# Patient Record
Sex: MALE | Race: WHITE | NOT HISPANIC OR LATINO | Employment: OTHER | URBAN - METROPOLITAN AREA
[De-identification: names, ages, dates, MRNs, and addresses within clinical notes are randomized per-mention and may not be internally consistent; named-entity substitution may affect disease eponyms.]

---

## 2017-01-06 ENCOUNTER — HOSPITAL ENCOUNTER (OUTPATIENT)
Dept: RADIOLOGY | Facility: MEDICAL CENTER | Age: 82
End: 2017-01-06
Attending: FAMILY MEDICINE
Payer: MEDICARE

## 2017-01-06 DIAGNOSIS — I71.9 DESCENDING AORTIC ANEURYSM (HCC): ICD-10-CM

## 2017-01-06 PROCEDURE — 76775 US EXAM ABDO BACK WALL LIM: CPT

## 2017-01-09 ENCOUNTER — TELEPHONE (OUTPATIENT)
Dept: MEDICAL GROUP | Facility: MEDICAL CENTER | Age: 82
End: 2017-01-09

## 2017-01-09 NOTE — TELEPHONE ENCOUNTER
----- Message from Lucinda Schilling M.D. sent at 1/9/2017  8:04 AM PST -----  Aneurysm is stable in size. Have you seen a vascular surgeon for this? I would like you to see one if you have not

## 2017-01-10 NOTE — TELEPHONE ENCOUNTER
My[facility]QU[facility] Released Result Comments      Entered by Lucinda Schilling M.D. at 1/9/2017  8:04 AM     Read by Dannie Thurston at 1/9/2017 12:33 PM     Aneurysm is stable in size. Have you seen a vascular surgeon for this? I would like you to see one if you have not

## 2017-01-30 ENCOUNTER — HOSPITAL ENCOUNTER (OUTPATIENT)
Dept: LAB | Facility: MEDICAL CENTER | Age: 82
End: 2017-01-30
Attending: FAMILY MEDICINE
Payer: MEDICARE

## 2017-01-30 DIAGNOSIS — I10 ESSENTIAL HYPERTENSION: ICD-10-CM

## 2017-01-30 DIAGNOSIS — E55.9 VITAMIN D INSUFFICIENCY: ICD-10-CM

## 2017-01-30 DIAGNOSIS — R53.83 OTHER FATIGUE: ICD-10-CM

## 2017-01-30 DIAGNOSIS — D75.89 MACROCYTOSIS: ICD-10-CM

## 2017-01-30 DIAGNOSIS — E78.5 DYSLIPIDEMIA, GOAL LDL BELOW 70: ICD-10-CM

## 2017-01-30 LAB
25(OH)D3 SERPL-MCNC: 29 NG/ML (ref 30–100)
ALBUMIN SERPL BCP-MCNC: 4.4 G/DL (ref 3.2–4.9)
ALBUMIN/GLOB SERPL: 1.8 G/DL
ALP SERPL-CCNC: 45 U/L (ref 30–99)
ALT SERPL-CCNC: 26 U/L (ref 2–50)
ANION GAP SERPL CALC-SCNC: 7 MMOL/L (ref 0–11.9)
AST SERPL-CCNC: 22 U/L (ref 12–45)
BASOPHILS # BLD AUTO: 0.09 K/UL (ref 0–0.12)
BASOPHILS NFR BLD AUTO: 1.1 % (ref 0–1.8)
BILIRUB SERPL-MCNC: 0.6 MG/DL (ref 0.1–1.5)
BUN SERPL-MCNC: 19 MG/DL (ref 8–22)
CALCIUM SERPL-MCNC: 9.2 MG/DL (ref 8.5–10.5)
CHLORIDE SERPL-SCNC: 103 MMOL/L (ref 96–112)
CHOLEST SERPL-MCNC: 164 MG/DL (ref 100–199)
CO2 SERPL-SCNC: 25 MMOL/L (ref 20–33)
CREAT SERPL-MCNC: 0.77 MG/DL (ref 0.5–1.4)
CREAT UR-MCNC: 134.2 MG/DL
EOSINOPHIL # BLD: 0.11 K/UL (ref 0–0.51)
EOSINOPHIL NFR BLD AUTO: 1.4 % (ref 0–6.9)
ERYTHROCYTE [DISTWIDTH] IN BLOOD BY AUTOMATED COUNT: 53.7 FL (ref 35.9–50)
FOLATE SERPL-MCNC: 21.8 NG/ML
GLOBULIN SER CALC-MCNC: 2.5 G/DL (ref 1.9–3.5)
GLUCOSE SERPL-MCNC: 99 MG/DL (ref 65–99)
HCT VFR BLD AUTO: 44 % (ref 42–52)
HDLC SERPL-MCNC: 61 MG/DL
HGB BLD-MCNC: 14 G/DL (ref 14–18)
IMM GRANULOCYTES # BLD AUTO: 0.02 K/UL (ref 0–0.11)
IMM GRANULOCYTES NFR BLD AUTO: 0.2 % (ref 0–0.9)
LDLC SERPL CALC-MCNC: 83 MG/DL
LYMPHOCYTES # BLD: 3.46 K/UL (ref 1–4.8)
LYMPHOCYTES NFR BLD AUTO: 43.2 % (ref 22–41)
MCH RBC QN AUTO: 32 PG (ref 27–33)
MCHC RBC AUTO-ENTMCNC: 31.8 G/DL (ref 33.7–35.3)
MCV RBC AUTO: 100.7 FL (ref 81.4–97.8)
MICROALBUMIN UR-MCNC: <0.7 MG/DL
MICROALBUMIN/CREAT UR: NORMAL MG/G (ref 0–30)
MONOCYTES # BLD: 0.65 K/UL (ref 0–0.85)
MONOCYTES NFR BLD AUTO: 8.1 % (ref 0–13.4)
NEUTROPHILS # BLD: 3.68 K/UL (ref 1.82–7.42)
NEUTROPHILS NFR BLD AUTO: 46 % (ref 44–72)
NRBC # BLD AUTO: 0.02 K/UL
NRBC BLD-RTO: 0.2 /100 WBC
PLATELET # BLD AUTO: 251 K/UL (ref 164–446)
PMV BLD AUTO: 10.2 FL (ref 9–12.9)
POTASSIUM SERPL-SCNC: 4.3 MMOL/L (ref 3.6–5.5)
PROT SERPL-MCNC: 6.9 G/DL (ref 6–8.2)
RBC # BLD AUTO: 4.37 M/UL (ref 4.7–6.1)
SODIUM SERPL-SCNC: 135 MMOL/L (ref 135–145)
TRIGL SERPL-MCNC: 102 MG/DL (ref 0–149)
TSH SERPL DL<=0.005 MIU/L-ACNC: 3.08 UIU/ML (ref 0.3–3.7)
VIT B12 SERPL-MCNC: 609 PG/ML (ref 211–911)
WBC # BLD AUTO: 8 K/UL (ref 4.8–10.8)

## 2017-01-30 PROCEDURE — 82746 ASSAY OF FOLIC ACID SERUM: CPT

## 2017-01-30 PROCEDURE — 85025 COMPLETE CBC W/AUTO DIFF WBC: CPT

## 2017-01-30 PROCEDURE — 82306 VITAMIN D 25 HYDROXY: CPT

## 2017-01-30 PROCEDURE — 82570 ASSAY OF URINE CREATININE: CPT

## 2017-01-30 PROCEDURE — 84443 ASSAY THYROID STIM HORMONE: CPT

## 2017-01-30 PROCEDURE — 82607 VITAMIN B-12: CPT

## 2017-01-30 PROCEDURE — 80061 LIPID PANEL: CPT

## 2017-01-30 PROCEDURE — 80053 COMPREHEN METABOLIC PANEL: CPT

## 2017-01-30 PROCEDURE — 82043 UR ALBUMIN QUANTITATIVE: CPT

## 2017-01-30 PROCEDURE — 36415 COLL VENOUS BLD VENIPUNCTURE: CPT

## 2017-02-08 ENCOUNTER — TELEPHONE (OUTPATIENT)
Dept: URGENT CARE | Facility: CLINIC | Age: 82
End: 2017-02-08

## 2017-02-09 NOTE — TELEPHONE ENCOUNTER
----- Message from Nathalie Madison M.D. sent at 2/8/2017  4:32 PM PST -----  Please advise pt his recent labs were normal, no changes or action needed at this time.  Nathalie Madison M.D. covering for Lucinda Schilling M.D.

## 2017-02-27 ENCOUNTER — OFFICE VISIT (OUTPATIENT)
Dept: MEDICAL GROUP | Facility: PHYSICIAN GROUP | Age: 82
End: 2017-02-27
Payer: MEDICARE

## 2017-02-27 VITALS
DIASTOLIC BLOOD PRESSURE: 64 MMHG | BODY MASS INDEX: 26.6 KG/M2 | SYSTOLIC BLOOD PRESSURE: 126 MMHG | RESPIRATION RATE: 16 BRPM | OXYGEN SATURATION: 95 % | TEMPERATURE: 97.8 F | HEIGHT: 71 IN | WEIGHT: 190 LBS | HEART RATE: 60 BPM

## 2017-02-27 DIAGNOSIS — E78.5 DYSLIPIDEMIA: ICD-10-CM

## 2017-02-27 DIAGNOSIS — R09.81 NASAL CONGESTION: ICD-10-CM

## 2017-02-27 DIAGNOSIS — Z23 NEED FOR VACCINATION: ICD-10-CM

## 2017-02-27 DIAGNOSIS — Z13.0 SCREENING FOR ENDOCRINE, METABOLIC AND IMMUNITY DISORDER: ICD-10-CM

## 2017-02-27 DIAGNOSIS — H40.9 GLAUCOMA, UNSPECIFIED GLAUCOMA, UNSPECIFIED LATERALITY: ICD-10-CM

## 2017-02-27 DIAGNOSIS — Z13.228 SCREENING FOR ENDOCRINE, METABOLIC AND IMMUNITY DISORDER: ICD-10-CM

## 2017-02-27 DIAGNOSIS — I10 ESSENTIAL HYPERTENSION: ICD-10-CM

## 2017-02-27 DIAGNOSIS — G89.29 CHRONIC LOW BACK PAIN, UNSPECIFIED BACK PAIN LATERALITY, WITH SCIATICA PRESENCE UNSPECIFIED: ICD-10-CM

## 2017-02-27 DIAGNOSIS — R07.89 OTHER CHEST PAIN: ICD-10-CM

## 2017-02-27 DIAGNOSIS — M54.5 CHRONIC LOW BACK PAIN, UNSPECIFIED BACK PAIN LATERALITY, WITH SCIATICA PRESENCE UNSPECIFIED: ICD-10-CM

## 2017-02-27 DIAGNOSIS — Z13.29 SCREENING FOR ENDOCRINE, METABOLIC AND IMMUNITY DISORDER: ICD-10-CM

## 2017-02-27 DIAGNOSIS — N40.0 BENIGN PROSTATIC HYPERPLASIA, PRESENCE OF LOWER URINARY TRACT SYMPTOMS UNSPECIFIED, UNSPECIFIED MORPHOLOGY: ICD-10-CM

## 2017-02-27 DIAGNOSIS — I77.811 ABDOMINAL AORTIC ECTASIA (HCC): ICD-10-CM

## 2017-02-27 PROBLEM — M54.50 CHRONIC LOW BACK PAIN: Status: ACTIVE | Noted: 2017-02-27

## 2017-02-27 PROCEDURE — G0009 ADMIN PNEUMOCOCCAL VACCINE: HCPCS | Performed by: INTERNAL MEDICINE

## 2017-02-27 PROCEDURE — 90732 PPSV23 VACC 2 YRS+ SUBQ/IM: CPT | Performed by: INTERNAL MEDICINE

## 2017-02-27 PROCEDURE — 99214 OFFICE O/P EST MOD 30 MIN: CPT | Mod: 25 | Performed by: INTERNAL MEDICINE

## 2017-02-27 PROCEDURE — 90472 IMMUNIZATION ADMIN EACH ADD: CPT | Performed by: INTERNAL MEDICINE

## 2017-02-27 PROCEDURE — 90736 HZV VACCINE LIVE SUBQ: CPT | Performed by: INTERNAL MEDICINE

## 2017-02-27 RX ORDER — IPRATROPIUM BROMIDE 42 UG/1
SPRAY, METERED NASAL
COMMUNITY
Start: 2017-02-22 | End: 2017-02-27

## 2017-02-27 ASSESSMENT — PATIENT HEALTH QUESTIONNAIRE - PHQ9: CLINICAL INTERPRETATION OF PHQ2 SCORE: 0

## 2017-02-27 NOTE — ASSESSMENT & PLAN NOTE
Pt is on simvastatin 40 mg daily and a baby aspirin daily. He follows with cardiology. Patient is taking medication as prescribed    Patient denies myalgias. Per chart review there is no elevated liver enzymes recently

## 2017-02-27 NOTE — ASSESSMENT & PLAN NOTE
Pt in on lisinopril 20 mg daily for HTN. She reports she is compliant with medication. BP is at goal per JNC 8 critieria today.     Denies chest pain, shortness of breath, headache

## 2017-02-27 NOTE — MR AVS SNAPSHOT
"        Dannie Thurston   2017 2:00 PM   Office Visit   MRN: [id number]    Department:  [facility]   Dept Phone:  [phone]    Description:  Male : 1934   Provider:  Rigoberto Mandujano M.D.           Reason for Visit     Establish Care test results      Allergies as of 2017     Allergen Noted Reactions    Nkda [No Known Drug Allergy] 2011         You were diagnosed with     Dyslipidemia   [527921]       Other chest pain   [786.59.ICD-9-CM]       Chronic low back pain, unspecified back pain laterality, with sciatica presence unspecified   [1631896]       Abdominal aortic ectasia (CMS-HCC)   [447.72.ICD-9-CM]       Benign prostatic hyperplasia, presence of lower urinary tract symptoms unspecified, unspecified morphology   [4037524]       Essential hypertension   [7994408]       Glaucoma, unspecified glaucoma, unspecified laterality   [4419413]       Nasal congestion   [466113]       Screening for endocrine, metabolic and immunity disorder   [9514949]       Need for vaccination   [613588]         Vital Signs     Blood Pressure Pulse Temperature Respirations Height Weight    126/64 mmHg 60 36.6 °C (97.8 °F) 16 1.803 m (5' 11\") 86.183 kg (190 lb)    Body Mass Index Oxygen Saturation Smoking Status             26.51 kg/m2 95% Never Smoker          Basic Information     Date Of Birth Sex Race Ethnicity Preferred Language    1934 Male White Non- English      Problem List              ICD-10-CM Priority Class Noted - Resolved    HTN (hypertension) I10   2011 - Present    Other chest pain - atypical R07.89   2011 - Present    Dyslipidemia E78.5   2011 - Present    Abdominal aortic ectasia (CMS-HCC) I77.811 High  2015 - Present    Descending aortic aneurysm (CMS-HCC) I71.9   2015 - Present    Insomnia G47.00   2015 - Present    Daytime hypersomnolence G47.19   2015 - Present    Macrocytosis D75.89   2015 - Present    BPH (benign prostatic hyperplasia) N40.0   " 12/14/2015 - Present    Malaise and fatigue R53.81, R53.83   12/14/2015 - Present    Nasal congestion R09.81   6/14/2016 - Present    Discoloration of tongue K14.8   6/14/2016 - Present    Fatigue R53.83   12/15/2016 - Present    Chronic low back pain M54.5, G89.29   2/27/2017 - Present    Glaucoma H40.9   2/27/2017 - Present      Health Maintenance        Date Due Completion Dates    COLONOSCOPY 5/5/1984 ---    IMM ZOSTER VACCINE 5/5/1994 ---    IMM PNEUMOCOCCAL 65+ (ADULT) LOW/MEDIUM RISK SERIES (2 of 2 - PPSV23) 1/27/2017 1/27/2016, 10/11/2014    IMM DTaP/Tdap/Td Vaccine (2 - Td) 6/13/2022 6/13/2012            Current Immunizations     13-VALENT PCV PREVNAR 1/27/2016  1:40 PM, 10/11/2014    Influenza Vaccine Quad Inj (Preserved) 10/19/2016    Pneumococcal polysaccharide vaccine (PPSV-23)  Incomplete    SHINGLES VACCINE  Incomplete    Tdap Vaccine 6/13/2012      Below and/or attached are the medications your provider expects you to take. Review all of your home medications and newly ordered medications with your provider and/or pharmacist. Follow medication instructions as directed by your provider and/or pharmacist. Please keep your medication list with you and share with your provider. Update the information when medications are discontinued, doses are changed, or new medications (including over-the-counter products) are added; and carry medication information at all times in the event of emergency situations     Allergies:  NKDA - (reactions not documented)               Medications  Valid as of: February 27, 2017 -  2:52 PM    Generic Name Brand Name Tablet Size Instructions for use    Ascorbic Acid   Take 500 mg by mouth.        Aspirin   Take 81 mg by mouth.        Cholecalciferol   Take  by mouth.        Dorzolamide HCl-Timolol Mal (Solution) COSOPT 22.3-6.8 MG/ML Place 1 Drop in left eye 2 times a day.        Finasteride (Tab) PROSCAR 5 MG Take 5 mg by mouth every day.        Fluticasone Propionate  (Suspension) FLONASE 50 MCG/ACT Spray 1 Spray in nose every day.        Glucosamine HCl   Take 1,500 mg by mouth.        Lisinopril (Tab) PRINIVIL 20 MG Take 30 mg by mouth every day.        Multiple Vitamins-Minerals   Take  by mouth.        Polyethylene Glycol 3350   Take  by mouth as needed.        Psyllium   Take  by mouth every day.        Simvastatin (Tab) ZOCOR 40 MG Take 40 mg by mouth every evening.        Tamsulosin HCl (Cap) FLOMAX 0.4 MG Take 0.4 mg by mouth ONE-HALF HOUR AFTER BREAKFAST. 2 tablets        .                 Medicines prescribed today were sent to:     [facility] - [place], [place] - [address] [facility]    [address] [place] [address]    Phone: [phone] Fax: [phone]    Open 24 Hours?: No    [facility] - [facility] [address] [facility]    [address] [facility] [address]    Phone: [phone] Fax: [phone]    Open 24 Hours?: No      Medication refill instructions:       If your prescription bottle indicates you have medication refills left, it is not necessary to call your provider’s office. Please contact your pharmacy and they will refill your medication.    If your prescription bottle indicates you do not have any refills left, you may request refills at any time through one of the following ways: The online [facility] system (except Urgent Care), by calling your provider’s office, or by asking your pharmacy to contact your provider’s office with a refill request. Medication refills are processed only during regular business hours and may not be available until the next business day. Your provider may request additional information or to have a follow-up visit with you prior to refilling your medication.   *Please Note: Medication refills are assigned a new Rx number when refilled electronically. Your pharmacy may indicate that no refills were authorized even though a new prescription for the same medication is available at the pharmacy. Please  request the medicine by name with the pharmacy before contacting your provider for a refill.        Your To Do List     Future Labs/Procedures Complete By Expires    CBC WITH DIFFERENTIAL  As directed 2/27/2018    COMP METABOLIC PANEL  As directed 2/27/2018    LIPID PROFILE  As directed 2/27/2018    TSH WITH REFLEX TO FT4  As directed 2/27/2018    VITAMIN D,25 HYDROXY  As directed 2/27/2018      Referral     A referral request has been sent to our patient care coordination department. Please allow 3-5 business days for us to process this request and contact you either by phone or mail. If you do not hear from us by the 5th business day, please call us at [phone].           [facility] Access Code: Activation code not generated  Current [facility] Status: Active

## 2017-02-27 NOTE — ASSESSMENT & PLAN NOTE
Pt has had this seen on ultrasound. The most recent ultrasound actually showed a decreased size from previous.

## 2017-02-27 NOTE — Clinical Note
[facility]  Rigoberto Mandujano M.D.  [address]  [place] [address]  Fax: [phone]   Authorization for Release/Disclosure of   Protected Health Information   Name: STUART SCHREIBER : 1934 SSN: [id number]   Address: [address] [place] [address] [employer]   [facility] [address] Phone:    [phone] (home)    I authorize the entity listed below to release/disclose the PHI below to:   [facility]/Rigoberto Mandujano M.D. and Rigoberto Mandujano M.D.   Provider or Entity Name:  [facility]   Address   [facility], [address]  [address] [place] [address] Phone:      Fax:     Reason for request: continuity of care   Information to be released:    [  ] LAST COLONOSCOPY,  including any PATH REPORT and follow-up  [  ] LAST FIT/COLOGUARD RESULT [  ] LAST DEXA  [  ] LAST MAMMOGRAM  [  ] LAST PAP  [  ] LAST LABS [  ] RETINA EXAM REPORT  [  ] IMMUNIZATION RECORDS  [  ] Release all info      [  ] Check here and initial the line next to each item to release ALL health information INCLUDING  _____ Care and treatment for drug and / or alcohol abuse  _____ HIV testing, infection status, or AIDS  _____ Genetic Testing    DATES OF SERVICE OR TIME PERIOD TO BE DISCLOSED: _____________  I understand and acknowledge that:  * This Authorization may be revoked at any time by you in writing, except if your health information has already been used or disclosed.  * Your health information that will be used or disclosed as a result of you signing this authorization could be re-disclosed by the recipient. If this occurs, your re-disclosed health information may no longer be protected by State or Federal laws.  * You may refuse to sign this Authorization. Your refusal will not affect your ability to obtain treatment.  * This Authorization becomes effective upon signing and will  on (date) __________.      If no date is indicated, this Authorization will  one (1) year from the signature date.    Name: Stuart Schreiber    Signature:   Date:        2/27/2017       PLEASE FAX REQUESTED RECORDS BACK TO: [phone]

## 2017-02-27 NOTE — ASSESSMENT & PLAN NOTE
Pt is on flonase for this symptoms with adequate control of symptom. He follows with ENT Dr. Chavez

## 2017-05-13 ENCOUNTER — PATIENT OUTREACH (OUTPATIENT)
Dept: HEALTH INFORMATION MANAGEMENT | Facility: OTHER | Age: 82
End: 2017-05-13

## 2017-05-13 NOTE — PROGRESS NOTES
Attempt #:1    WebIZ Checked & Epic Updated: yes  HealthConnect Verified: no  Verify PCP: yes    Communication Preference Obtained: yes     Review Care Team: yes    Annual Wellness Visit Scheduling  1. Scheduling Status:Scheduled       Care Gap Scheduling (Attempt to Schedule EACH Overdue Care Gap!)     Health Maintenance Due   Topic Date Due   • COLONOSCOPY  Requesting Records         [employer] Activation: already active  Predictvia Brien: no  Virtual Visits: no  Opt In to Text Messages: no

## 2017-05-20 ENCOUNTER — OFFICE VISIT (OUTPATIENT)
Dept: MEDICAL GROUP | Facility: PHYSICIAN GROUP | Age: 82
End: 2017-05-20
Payer: MEDICARE

## 2017-05-20 VITALS
WEIGHT: 187 LBS | HEART RATE: 81 BPM | TEMPERATURE: 97.9 F | OXYGEN SATURATION: 96 % | HEIGHT: 71 IN | BODY MASS INDEX: 26.18 KG/M2 | SYSTOLIC BLOOD PRESSURE: 120 MMHG | DIASTOLIC BLOOD PRESSURE: 68 MMHG

## 2017-05-20 DIAGNOSIS — R07.89 OTHER CHEST PAIN: ICD-10-CM

## 2017-05-20 DIAGNOSIS — E78.5 DYSLIPIDEMIA: ICD-10-CM

## 2017-05-20 DIAGNOSIS — H40.9 GLAUCOMA, UNSPECIFIED GLAUCOMA, UNSPECIFIED LATERALITY: ICD-10-CM

## 2017-05-20 DIAGNOSIS — I10 ESSENTIAL HYPERTENSION: ICD-10-CM

## 2017-05-20 DIAGNOSIS — M54.5 CHRONIC LOW BACK PAIN, UNSPECIFIED BACK PAIN LATERALITY, WITH SCIATICA PRESENCE UNSPECIFIED: ICD-10-CM

## 2017-05-20 DIAGNOSIS — R53.81 MALAISE AND FATIGUE: ICD-10-CM

## 2017-05-20 DIAGNOSIS — N40.0 BENIGN PROSTATIC HYPERPLASIA, PRESENCE OF LOWER URINARY TRACT SYMPTOMS UNSPECIFIED, UNSPECIFIED MORPHOLOGY: ICD-10-CM

## 2017-05-20 DIAGNOSIS — I71.9 DESCENDING AORTIC ANEURYSM (HCC): ICD-10-CM

## 2017-05-20 DIAGNOSIS — G47.19 DAYTIME HYPERSOMNOLENCE: ICD-10-CM

## 2017-05-20 DIAGNOSIS — R09.81 NASAL CONGESTION: ICD-10-CM

## 2017-05-20 DIAGNOSIS — G47.00 INSOMNIA, UNSPECIFIED TYPE: ICD-10-CM

## 2017-05-20 DIAGNOSIS — G89.29 CHRONIC LOW BACK PAIN, UNSPECIFIED BACK PAIN LATERALITY, WITH SCIATICA PRESENCE UNSPECIFIED: ICD-10-CM

## 2017-05-20 DIAGNOSIS — I77.811 ABDOMINAL AORTIC ECTASIA (HCC): ICD-10-CM

## 2017-05-20 DIAGNOSIS — R53.83 MALAISE AND FATIGUE: ICD-10-CM

## 2017-05-20 DIAGNOSIS — K14.8 DISCOLORATION OF TONGUE: ICD-10-CM

## 2017-05-20 DIAGNOSIS — D75.89 MACROCYTOSIS: ICD-10-CM

## 2017-05-20 RX ORDER — IBUPROFEN 200 MG
200 TABLET ORAL EVERY 6 HOURS PRN
Status: ON HOLD | COMMUNITY
End: 2017-08-15

## 2017-05-20 RX ORDER — TIMOLOL MALEATE 5 MG/ML
1 SOLUTION/ DROPS OPHTHALMIC 2 TIMES DAILY
COMMUNITY
End: 2017-08-04

## 2017-05-20 ASSESSMENT — PATIENT HEALTH QUESTIONNAIRE - PHQ9: CLINICAL INTERPRETATION OF PHQ2 SCORE: 0

## 2017-05-20 NOTE — ASSESSMENT & PLAN NOTE
Chronic condition managed with current medical regimen   Continue with current diet management  Followed by Rigoberto Mandujano M.D..

## 2017-05-20 NOTE — PROGRESS NOTES
CC:   Medicare Annual Wellness Visit    HPI:  Dannie is a 83 y.o. here for Medicare Annual Wellness Visit    Patient Active Problem List    Diagnosis Date Noted   • Abdominal aortic ectasia (CMS-HCC) 04/08/2015     Priority: High   • Chronic low back pain 02/27/2017   • Glaucoma 02/27/2017   • Fatigue 12/15/2016   • Nasal congestion 06/14/2016   • Discoloration of tongue 06/14/2016   • BPH (benign prostatic hyperplasia) 12/14/2015   • Insomnia 07/07/2015   • Daytime hypersomnolence 07/07/2015   • Macrocytosis 07/07/2015   • HTN (hypertension) 08/05/2011   • Other chest pain - atypical 08/05/2011   • Dyslipidemia 08/05/2011     Current Outpatient Prescriptions   Medication Sig Dispense Refill   • timolol (TIMOPTIC) 0.5 % Solution Place 1 Drop in both eyes 2 times a day.     • ibuprofen (MOTRIN) 200 MG Tab Take 200 mg by mouth every 6 hours as needed.     • fluticasone (FLONASE) 50 MCG/ACT nasal spray Spray 1 Spray in nose every day. 16 g 3   • finasteride (PROSCAR) 5 MG TABS Take 5 mg by mouth every day.     • tamsulosin (FLOMAX) 0.4 MG capsule Take 0.4 mg by mouth ONE-HALF HOUR AFTER BREAKFAST. 2 tablets     • lisinopril (PRINIVIL) 20 MG TABS Take 30 mg by mouth every day.     • simvastatin (ZOCOR) 40 MG TABS Take 40 mg by mouth every evening.     • dorzolamide-timolol (COSOPT) 2-0.5 % SOLN Place 1 Drop in left eye 2 times a day.     • Ascorbic Acid (VITAMIN C PO) Take 500 mg by mouth.     • ASPIRIN PO Take 81 mg by mouth.     • Multiple Vitamins-Minerals (CENTRUM SILVER PO) Take  by mouth.     • GLUCOSAMINE PO Take 1,500 mg by mouth.     • Psyllium (METAMUCIL PO) Take  by mouth every day.     • VITAMIN D PO Take  by mouth.     • Polyethylene Glycol 3350 (MIRALAX PO) Take  by mouth as needed.       No current facility-administered medications for this visit.      Current supplements: no  Chronic narcotic pain medicines: no  Allergies: Nkda  Exercise: yes active  Current social contact/activities Has support of  family and friends      Screening:  Depression Screening    Little interest or pleasure in doing things?  0 - not at all  Feeling down, depressed , or hopeless? 0 - not at all  Trouble falling or staying asleep, or sleeping too much?     Feeling tired or having little energy?     Poor appetite or overeating?     Feeling bad about yourself - or that you are a failure or have let yourself or your family down?    Trouble concentrating on things, such as reading the newspaper or watching television?    Moving or speaking so slowly that other people could have noticed.  Or the opposite - being so fidgety or restless that you have been moving around a lot more than usual?     Thoughts that you would be better off dead, or of hurting yourself?     Patient Health Questionnaire Score:    If depressive symptoms identified deferred to follow up visit unless specifically addressed in assessment and plan.    Interpretation of PHQ-9 Total Score   Score Severity   1-4 Minimal Depression   5-9 Mild Depression   10-14 Moderate Depression   15-19 Moderately Severe Depression   20-27 Severe Depression    Screening for Cognitive Impairment    Three Minute Recall (banana, sunrise, fence)  2/3    Draw clock face with all 12 numbers set to the hand to show 10 minures past 11 o'clock  1 4  If cognitive concerns identified deferred to follow up visit unless specifically addressed in assessment and plan.    Fall Risk Assessment    Has the patient had two or more falls in the last year or any fall with injury in the last year?  No  If Fall Risk identified deferred to follow up visit unless specifically addressed in assessment and plan.    Safety Assessment    Throw rugs on floor.  No  Handrails on all stairs.  Yes  Good lighting in all hallways.  Yes  Difficulty hearing.  No  Patient counseled about all safety risks that were identified.    Functional Assessment ADLs    Are there any barriers preventing you from cooking for yourself or  meeting nutritional needs?  No.    Are there any barriers preventing you from driving safely or obtaining transportation?  No.    Are there any barriers preventing you from using a telephone or calling for help?  No.    Are there any barriers preventing you from shopping?  No.    Are there any barriers preventing you from taking care of your own finances?  No.    Are there any barriers preventing you from managing your medications?  No.    Are currently engaing any exercise or physical activity?  Yes.       Health Maintenance Summary                Annual Wellness Visit Overdue 5/5/1934     IMM DTaP/Tdap/Td Vaccine Next Due 6/13/2022      Done 6/13/2012 Imm Admin: Tdap Vaccine          Patient Care Team:  Rigoberto Mandujano M.D. as PCP - General (Internal Medicine)  Juan F Reyes M.D. as Consulting Physician (Cardiology)  Mely Shaver M.D. as Consulting Physician (Dermatology)  Dhaval Tate M.D. as Consulting Physician (Surgery)  Matthew Ramos M.D. as Consulting Physician (Otolaryngology)  Ty Sanchez M.D. as Consulting Physician (Phys Med and Rehab)  Pancho Olivares M.D. as Consulting Physician (Oncology)  Reji Moralez M.D. as Consulting Physician (Ophthalmology)  Reece Montague PA-C as Consulting Physician (Urology)  Dakota Allan M.D. as Consulting Physician (Gastroenterology)  LA NENA Reid as Consulting Physician (Audiology)        Social History   Substance Use Topics   • Smoking status: Former Smoker -- 3 years     Types: Pipe, Cigars     Quit date: 05/20/1972   • Smokeless tobacco: Never Used   • Alcohol Use: 1.2 oz/week     1 Shots of liquor, 1 Cans of beer per week       Family History   Problem Relation Age of Onset   • Kidney Disease Mother    • Heart Disease Father      He  has a past medical history of Arthritis; Hypertension; CATARACT; Hyperlipidemia; and BPH (benign prostatic hyperplasia).   Past Surgical History   Procedure Laterality Date   • Tonsillectomy       CHILD   •  "Cataract phaco with iol  2008     BILATERAL   • Trigger finger release  10/8/2010     Performed by VIV COHEN at SURGERY SAME DAY [facility] ORS       ROS:    Ostomy or other tubes or amputations: no  Chronic oxygen use no  Last eye exam 3/2017  : Denies incontinence.   Gait: Uses no assistive device   Hearing excellent.    Dentition good    Exam: Blood pressure 120/68, pulse 81, temperature 36.6 °C (97.9 °F), height 1.816 m (5' 11.5\"), weight 84.823 kg (187 lb), SpO2 96 %. Body mass index is 25.72 kg/(m^2).  Alert, oriented in no acute distress.  Eye contact is good, speech goal directed, affect calm      Assessment and Plan. The following treatment and monitoring plan is recommended for all problems as listed below:   Abdominal aortic ectasia  Followed by cardiology q 12 months  Denies sxs  Appears to have decreased in size with most recent u/s     BPH (benign prostatic hyperplasia)  Chronic condition managed with current medical regimen  Stable per review   Continue with current meds  Followed by urology       Chronic low back pain  Chronic condition managed with current medical regimen  Stable per review   Continue with plan for PT, has had an epidural in the past  Followed by ortho q 6 wks       Descending aortic aneurysm (CMS-HCC)  duplicate    Daytime hypersomnolence  Pt states has had discussed with cardiology who was concerned about a lower heart rate  Pt will discuss sleep apnea concern at next visit    Discoloration of tongue  Per pt has had work up and states no findings  Followed by Rigoberto Mandujano M.D..     Dyslipidemia  Chronic condition managed with current medical regimen   Continue with current diet management  Followed by Rigoberto Mandujano M.D..         Fatigue  Chronic condition managed with current medical regimen  Stable per review   Continue with current surveillance  Followed by Rigoberto Mandujano M.D..         Glaucoma  Chronic condition managed with current medical regimen  Stable per review   " Continue with current eye meds  Followed by opth.         HTN (hypertension)  Chronic condition managed with current medical regimen  Stable per review   Continue with current meds  Followed by Rigoberto Mandujano M.D..         Insomnia  Chronic condition managed with current medical regimen  Stable per review   Continue with surveillance  Followed by Rigoberto Mandujano M.D..         Macrocytosis  Followed by Rigoberto Mandujano M.D..     Malaise and fatigue  duplicate    Nasal congestion  Chronic condition managed with current medical regimen  Stable per review   Continue with current meds prn  Followed by Rigoberto Mandujano M.D..          Other chest pain - atypical  Followed by cardiology q 6 months  Denies cardiac sxs  Continue with current medications           Health Care Screening recommendations reviewed with patient today: per Patient Instructions  DPA/Advanced directive: .has an advanced directive - a copy has been provided    Next office visit for recheck of chronic medical conditions is due with Rigoberto Mandujano M.D. 8/28/2017

## 2017-05-20 NOTE — ASSESSMENT & PLAN NOTE
Pt states has had discussed with cardiology who was concerned about a lower heart rate  Pt will discuss sleep apnea concern at next visit

## 2017-05-20 NOTE — PATIENT INSTRUCTIONS
Continue with care through Rigoberto Mandujano M.D..  Next Medicare Annual Wellness Visit is due in one year.    Continue care with specialists you are seeing for your chronic problems.    Attached is some preventative information for you to read.          Fall Prevention and Home Safety  Falls cause injuries and can affect all age groups. It is possible to prevent falls.   HOW TO PREVENT FALLS  · Wear shoes with rubber soles that do not have an opening for your toes.   · Keep the inside and outside of your house well lit.   · Use night lights throughout your home.   · Remove clutter from floors.   · Clean up floor spills.   · Remove throw rugs or fasten them to the floor with carpet tape.   · Do not place electrical cords across pathways.   · Put grab bars by your tub, shower, and toilet. Do not use towel bars as grab bars.   · Put handrails on both sides of the stairway. Fix loose handrails.   · Do not climb on stools or stepladders, if possible.   · Do not wax your floors.   · Repair uneven or unsafe sidewalks, walkways, or stairs.   · Keep items you use a lot within reach.   · Be aware of pets.   · Keep emergency numbers next to the telephone.   · Put smoke detectors in your home and near bedrooms.   Ask your doctor what other things you can do to prevent falls.  Document Released: 10/14/2010 Document Revised: 06/18/2013 Document Reviewed: 03/19/2013  ExitCare® Patient Information ©2013 [employer].    Exercise to Stay Healthy      Exercise helps you become and stay healthy.  EXERCISE IDEAS AND TIPS  Choose exercises that:  · You enjoy.   · Fit into your day.   You do not need to exercise really hard to be healthy. You can do exercises at a slow or medium level and stay healthy. You can:  · Stretch before and after working out.   · Try yoga, Pilates, or renetta chi.   · Lift weights.   · Walk fast, swim, jog, run, climb stairs, bicycle, dance, or rollerskate.   · Take aerobic classes.   Exercises that burn about 150  calories:  · Running 1 ½ miles in 15 minutes.   · Playing volleyball for 45 to 60 minutes.   · Washing and waxing a car for 45 to 60 minutes.   · Playing touch football for 45 minutes.   · Walking 1 ¾ miles in 35 minutes.   · Pushing a stroller 1 ½ miles in 30 minutes.   · Playing basketball for 30 minutes.   · Raking leaves for 30 minutes.   · Bicycling 5 miles in 30 minutes.   · Walking 2 miles in 30 minutes.   · Dancing for 30 minutes.   · Shoveling snow for 15 minutes.   · Swimming laps for 20 minutes.   · Walking up stairs for 15 minutes.   · Bicycling 4 miles in 15 minutes.   · Gardening for 30 to 45 minutes.   · Jumping rope for 15 minutes.   · Washing windows or floors for 45 to 60 minutes.     One suggestion is to start walking for 10 minutes after each meal.  This will help with digestion and help you to metabolize your meal.       For Weight Loss -   Recommend portion sizes with more fruits/vegetables/high fiber foods.  Stay away from white bread/pastas/white rice/white potatoes.  Increase Fluid intake to 6-8 glasses of water daily.   Eliminate soda's (diet and regular) from your fluid intake.      Document Released: 01/20/2012 Document Revised: 03/11/2013 Document Reviewed: 01/20/2012  ExitCare® Patient Information ©2013 [employer].    Fat and Cholesterol Control Diet  Cholesterol is a wax-like substance. It comes from your liver and is found in certain foods. There is good (HDL) and bad (LDL) cholesterol. Too much cholesterol in your blood can affect your heart. Certain foods can lower or raise your cholesterol. Eat foods that are low in cholesterol.  Saturated and trans fats are bad fats found in foods that will raise your cholesterol. Do not eat foods that are high in saturated and trans fats.  FOODS HIGHER IN SATURATED AND TRANS FATS  · Dairy products, such as whole milk, eggs, cheese, cream, and butter.   · Fatty meats, such as hot dogs, sausage, and salami.   · Fried foods.   · Trans fats which  are found in margarine and pre-made cookies, crackers, and baked goods.   · Tropical oils, such as coconut and palm oils.   Read package labels at the store. Do not buy products that use saturated or trans fats or hydrogenated oils. Find foods labeled:  · Low-fat.   · Low-saturated fat.   · Trans-fat-free.   · Low-cholesterol.   FOODS LOWER IN CHOLESTEROL  ·  Fruit.   · Vegetables.   · Beans, peas, and lentils.   · Fish.   · Lean meat, such as chicken (without skin) or ground turkey.   · Grains, such as barley, rice, couscous, bulgur wheat, and pasta.   · Heart-healthy tub margarine.   PREPARING YOUR FOOD  · Broil, bake, steam, or roast foods. Do not harrington food.   · Use non-stick cooking sprays.   · Use lemon or herbs to flavor food instead of using butter or stick margarine.   · Use nonfat yogurt, salsa, or low-fat dressings for salads.   Document Released: 06/18/2013 Document Reviewed: 06/18/2013  ExitCare® Patient Information ©2013 [employer].    Recommend annual flu vaccine.  Next due in Fall, 2015.  If you decide not to have the flu vaccine, recommend good handwashing and staying out of crowds during flu season.

## 2017-05-20 NOTE — ASSESSMENT & PLAN NOTE
Chronic condition managed with current medical regimen  Stable per review   Continue with surveillance  Followed by Rigoberto Mandujano M.D..

## 2017-05-20 NOTE — ASSESSMENT & PLAN NOTE
Chronic condition managed with current medical regimen  Stable per review   Continue with current eye meds  Followed by opth.

## 2017-05-20 NOTE — ASSESSMENT & PLAN NOTE
Chronic condition managed with current medical regimen  Stable per review   Continue with current surveillance  Followed by Rigoberto Mandujano M.D..

## 2017-05-20 NOTE — ASSESSMENT & PLAN NOTE
Chronic condition managed with current medical regimen  Stable per review   Continue with current meds  Followed by Rigoberto Mandujano M.D..

## 2017-05-20 NOTE — ASSESSMENT & PLAN NOTE
Chronic condition managed with current medical regimen  Stable per review   Continue with current meds prn  Followed by Rigoberto Mandujano M.D..

## 2017-05-20 NOTE — ASSESSMENT & PLAN NOTE
Chronic condition managed with current medical regimen  Stable per review   Continue with current meds  Followed by urology

## 2017-05-20 NOTE — ASSESSMENT & PLAN NOTE
Followed by cardiology q 12 months  Denies sxs  Appears to have decreased in size with most recent u/s

## 2017-05-20 NOTE — ASSESSMENT & PLAN NOTE
Chronic condition managed with current medical regimen  Stable per review   Continue with plan for PT, has had an epidural in the past  Followed by ortho q 6 wks

## 2017-06-06 ENCOUNTER — OFFICE VISIT (OUTPATIENT)
Dept: URGENT CARE | Facility: PHYSICIAN GROUP | Age: 82
End: 2017-06-06
Payer: MEDICARE

## 2017-06-06 VITALS
HEART RATE: 88 BPM | OXYGEN SATURATION: 94 % | WEIGHT: 185 LBS | HEIGHT: 71 IN | RESPIRATION RATE: 16 BRPM | TEMPERATURE: 97.7 F | DIASTOLIC BLOOD PRESSURE: 68 MMHG | BODY MASS INDEX: 25.9 KG/M2 | SYSTOLIC BLOOD PRESSURE: 110 MMHG

## 2017-06-06 DIAGNOSIS — K40.90 UNILATERAL INGUINAL HERNIA WITHOUT OBSTRUCTION OR GANGRENE, RECURRENCE NOT SPECIFIED: ICD-10-CM

## 2017-06-06 PROCEDURE — 99203 OFFICE O/P NEW LOW 30 MIN: CPT | Performed by: NURSE PRACTITIONER

## 2017-06-06 PROCEDURE — 1101F PT FALLS ASSESS-DOCD LE1/YR: CPT | Performed by: NURSE PRACTITIONER

## 2017-06-06 PROCEDURE — G8432 DEP SCR NOT DOC, RNG: HCPCS | Performed by: NURSE PRACTITIONER

## 2017-06-06 PROCEDURE — 4040F PNEUMOC VAC/ADMIN/RCVD: CPT | Performed by: NURSE PRACTITIONER

## 2017-06-06 PROCEDURE — G8419 CALC BMI OUT NRM PARAM NOF/U: HCPCS | Performed by: NURSE PRACTITIONER

## 2017-06-06 PROCEDURE — 1036F TOBACCO NON-USER: CPT | Performed by: NURSE PRACTITIONER

## 2017-06-06 ASSESSMENT — ENCOUNTER SYMPTOMS
VOMITING: 0
ABDOMINAL PAIN: 1
DIARRHEA: 0
CONSTIPATION: 0
NAUSEA: 0
FEVER: 0

## 2017-06-06 NOTE — PATIENT INSTRUCTIONS
Inguinal Hernia, Adult  Muscles help keep everything in the body in its proper place. But if a weak spot in the muscles develops, something can poke through. That is called a hernia. When this happens in the lower part of the belly (abdomen), it is called an inguinal hernia. (It takes its name from a part of the body in this region called the inguinal canal.) A weak spot in the wall of muscles lets some fat or part of the small intestine bulge through. An inguinal hernia can develop at any age. Men get them more often than women.  CAUSES   In adults, an inguinal hernia develops over time.  · It can be triggered by:  ¨ Suddenly straining the muscles of the lower abdomen.  ¨ Lifting heavy objects.  ¨ Straining to have a bowel movement. Difficult bowel movements (constipation) can lead to this.  ¨ Constant coughing. This may be caused by smoking or lung disease.  ¨ Being overweight.  ¨ Being pregnant.  ¨ Working at a job that requires long periods of standing or heavy lifting.  ¨ Having had an inguinal hernia before.  One type can be an emergency situation. It is called a strangulated inguinal hernia. It develops if part of the small intestine slips through the weak spot and cannot get back into the abdomen. The blood supply can be cut off. If that happens, part of the intestine may die. This situation requires emergency surgery.  SYMPTOMS   Often, a small inguinal hernia has no symptoms. It is found when a healthcare provider does a physical exam. Larger hernias usually have symptoms.   · In adults, symptoms may include:  ¨ A lump in the groin. This is easier to see when the person is standing. It might disappear when lying down.  ¨ In men, a lump in the scrotum.  ¨ Pain or burning in the groin. This occurs especially when lifting, straining or coughing.  ¨ A dull ache or feeling of pressure in the groin.  · Signs of a strangulated hernia can include:  ¨ A bulge in the groin that becomes very painful and tender to the  touch.  ¨ A bulge that turns red or purple.  ¨ Fever, nausea and vomiting.  ¨ Inability to have a bowel movement or to pass gas.  DIAGNOSIS   To decide if you have an inguinal hernia, a healthcare provider will probably do a physical examination.  · This will include asking questions about any symptoms you have noticed.  · The healthcare provider might feel the groin area and ask you to cough. If an inguinal hernia is felt, the healthcare provider may try to slide it back into the abdomen.  · Usually no other tests are needed.  TREATMENT   Treatments can vary. The size of the hernia makes a difference. Options include:  · Watchful waiting. This is often suggested if the hernia is small and you have had no symptoms.  ¨ No medical procedure will be done unless symptoms develop.  ¨ You will need to watch closely for symptoms. If any occur, contact your healthcare provider right away.  · Surgery. This is used if the hernia is larger or you have symptoms.  ¨ Open surgery. This is usually an outpatient procedure (you will not stay overnight in a hospital). An cut (incision) is made through the skin in the groin. The hernia is put back inside the abdomen. The weak area in the muscles is then repaired by herniorrhaphy or hernioplasty. Herniorrhaphy: in this type of surgery, the weak muscles are sewn back together. Hernioplasty: a patch or mesh is used to close the weak area in the abdominal wall.  ¨ Laparoscopy. In this procedure, a surgeon makes small incisions. A thin tube with a tiny video camera (called a laparoscope) is put into the abdomen. The surgeon repairs the hernia with mesh by looking with the video camera and using two long instruments.  HOME CARE INSTRUCTIONS   · After surgery to repair an inguinal hernia:  ¨ You will need to take pain medicine prescribed by your healthcare provider. Follow all directions carefully.  ¨ You will need to take care of the wound from the incision.  ¨ Your activity will be  "restricted for awhile. This will probably include no heavy lifting for several weeks. You also should not do anything too active for a few weeks. When you can return to work will depend on the type of job that you have.  · During \"watchful waiting\" periods, you should:  ¨ Maintain a healthy weight.  ¨ Eat a diet high in fiber (fruits, vegetables and whole grains).  ¨ Drink plenty of fluids to avoid constipation. This means drinking enough water and other liquids to keep your urine clear or pale yellow.  ¨ Do not lift heavy objects.  ¨ Do not stand for long periods of time.  ¨ Quit smoking. This should keep you from developing a frequent cough.  SEEK MEDICAL CARE IF:   · A bulge develops in your groin area.  · You feel pain, a burning sensation or pressure in the groin. This might be worse if you are lifting or straining.  · You develop a fever of more than 100.5° F (38.1° C).  SEEK IMMEDIATE MEDICAL CARE IF:   · Pain in the groin increases suddenly.  · A bulge in the groin gets bigger suddenly and does not go down.  · For men, there is sudden pain in the scrotum. Or, the size of the scrotum increases.  · A bulge in the groin area becomes red or purple and is painful to touch.  · You have nausea or vomiting that does not go away.  · You feel your heart beating much faster than normal.  · You cannot have a bowel movement or pass gas.  · You develop a fever of more than 102.0° F (38.9° C).     This information is not intended to replace advice given to you by your health care provider. Make sure you discuss any questions you have with your health care provider.     Document Released: 05/06/2010 Document Revised: 03/11/2013 Document Reviewed: 05/06/2010  [employer] Interactive Patient Education ©2016 [employer]    "

## 2017-08-04 DIAGNOSIS — Z01.812 PRE-OPERATIVE LABORATORY EXAMINATION: ICD-10-CM

## 2017-08-04 DIAGNOSIS — Z01.810 PRE-OPERATIVE CARDIOVASCULAR EXAMINATION: ICD-10-CM

## 2017-08-04 LAB
ANION GAP SERPL CALC-SCNC: 9 MMOL/L (ref 0–11.9)
BUN SERPL-MCNC: 15 MG/DL (ref 8–22)
CALCIUM SERPL-MCNC: 9.5 MG/DL (ref 8.5–10.5)
CHLORIDE SERPL-SCNC: 104 MMOL/L (ref 96–112)
CO2 SERPL-SCNC: 23 MMOL/L (ref 20–33)
CREAT SERPL-MCNC: 0.76 MG/DL (ref 0.5–1.4)
EKG IMPRESSION: NORMAL
GFR SERPL CREATININE-BSD FRML MDRD: >60 ML/MIN/1.73 M 2
GLUCOSE SERPL-MCNC: 85 MG/DL (ref 65–99)
POTASSIUM SERPL-SCNC: 4.7 MMOL/L (ref 3.6–5.5)
SODIUM SERPL-SCNC: 136 MMOL/L (ref 135–145)

## 2017-08-04 PROCEDURE — 80048 BASIC METABOLIC PNL TOTAL CA: CPT

## 2017-08-04 PROCEDURE — 93010 ELECTROCARDIOGRAM REPORT: CPT | Performed by: INTERNAL MEDICINE

## 2017-08-04 PROCEDURE — 36415 COLL VENOUS BLD VENIPUNCTURE: CPT

## 2017-08-04 PROCEDURE — 93005 ELECTROCARDIOGRAM TRACING: CPT

## 2017-08-04 RX ORDER — AMOXICILLIN 500 MG
CAPSULE ORAL DAILY
COMMUNITY
End: 2022-10-06

## 2017-08-04 NOTE — OR NURSING
preadmit appointment: Pt. Instructed to continue regularly prescribed medication through the day before surgery, instructed to take the following medications, the day of surgery, with a sip of water per anesthesia protocol : Flomax

## 2017-08-07 ENCOUNTER — TELEPHONE (OUTPATIENT)
Dept: MEDICAL GROUP | Facility: PHYSICIAN GROUP | Age: 82
End: 2017-08-07

## 2017-08-07 NOTE — TELEPHONE ENCOUNTER
----- Message from Rigoberto Mandujano M.D. sent at 8/6/2017  8:26 PM PDT -----  Can we please forward patient's recent EKG results to Dr. Juan F Reyes - patient's cardiologist at [facility]? Thank you    Rigoberto Mandujano M.D.

## 2017-08-08 PROCEDURE — 85027 COMPLETE CBC AUTOMATED: CPT

## 2017-08-14 ENCOUNTER — RX ONLY (OUTPATIENT)
Age: 82
Setting detail: RX ONLY
End: 2017-08-14

## 2017-08-15 ENCOUNTER — HOSPITAL ENCOUNTER (OUTPATIENT)
Facility: MEDICAL CENTER | Age: 82
End: 2017-08-15
Attending: SURGERY | Admitting: SURGERY
Payer: MEDICARE

## 2017-08-15 VITALS
HEART RATE: 72 BPM | HEIGHT: 71 IN | OXYGEN SATURATION: 95 % | WEIGHT: 188.93 LBS | SYSTOLIC BLOOD PRESSURE: 103 MMHG | TEMPERATURE: 97.3 F | BODY MASS INDEX: 26.45 KG/M2 | DIASTOLIC BLOOD PRESSURE: 74 MMHG | RESPIRATION RATE: 16 BRPM

## 2017-08-15 PROBLEM — K40.20 BILATERAL INGUINAL HERNIA: Status: ACTIVE | Noted: 2017-08-15

## 2017-08-15 LAB — EKG IMPRESSION: NORMAL

## 2017-08-15 PROCEDURE — 700101 HCHG RX REV CODE 250

## 2017-08-15 PROCEDURE — 93010 ELECTROCARDIOGRAM REPORT: CPT | Performed by: INTERNAL MEDICINE

## 2017-08-15 PROCEDURE — 93005 ELECTROCARDIOGRAM TRACING: CPT | Performed by: STUDENT IN AN ORGANIZED HEALTH CARE EDUCATION/TRAINING PROGRAM

## 2017-08-15 RX ORDER — LIDOCAINE HYDROCHLORIDE 10 MG/ML
INJECTION, SOLUTION INFILTRATION; PERINEURAL
Status: COMPLETED
Start: 2017-08-15 | End: 2017-08-15

## 2017-08-15 RX ORDER — SODIUM CHLORIDE, SODIUM LACTATE, POTASSIUM CHLORIDE, CALCIUM CHLORIDE 600; 310; 30; 20 MG/100ML; MG/100ML; MG/100ML; MG/100ML
INJECTION, SOLUTION INTRAVENOUS CONTINUOUS
Status: DISCONTINUED | OUTPATIENT
Start: 2017-08-15 | End: 2017-08-15 | Stop reason: HOSPADM

## 2017-08-15 RX ORDER — HYDROCODONE BITARTRATE AND ACETAMINOPHEN 7.5; 325 MG/1; MG/1
1 TABLET ORAL EVERY 4 HOURS PRN
Qty: 20 TAB | Refills: 0 | Status: SHIPPED | OUTPATIENT
Start: 2017-08-15 | End: 2019-05-16

## 2017-08-15 RX ADMIN — LIDOCAINE HYDROCHLORIDE 0.1 ML: 10 INJECTION, SOLUTION INFILTRATION; PERINEURAL at 08:15

## 2017-08-15 RX ADMIN — SODIUM CHLORIDE, SODIUM LACTATE, POTASSIUM CHLORIDE, CALCIUM CHLORIDE: 600; 310; 30; 20 INJECTION, SOLUTION INTRAVENOUS at 08:15

## 2017-08-15 ASSESSMENT — PAIN SCALES - GENERAL: PAINLEVEL_OUTOF10: 0

## 2017-08-15 NOTE — OR NURSING
Patient allergies and NPO status verified, home medication reconciliation completed, belongings secured. Patient verbalizes understanding of pain scale, expected course of stay and plan of care. Surgical site verified with patient, IV access established sequentials placed on BLE.      1045 MD at bedside previous EKG show pt in A-fib. F/U stat EKG ordered in pre-op. Pt still in A-Fib. MD canceled surgery due to current cardiac status. Pt educated to f/u with cardiologist.      1110 Pt D/C home.

## 2017-08-18 ENCOUNTER — HOSPITAL ENCOUNTER (OUTPATIENT)
Dept: LAB | Facility: MEDICAL CENTER | Age: 82
End: 2017-08-18
Attending: INTERNAL MEDICINE
Payer: MEDICARE

## 2017-08-18 DIAGNOSIS — Z13.0 SCREENING FOR ENDOCRINE, METABOLIC AND IMMUNITY DISORDER: ICD-10-CM

## 2017-08-18 DIAGNOSIS — Z13.29 SCREENING FOR ENDOCRINE, METABOLIC AND IMMUNITY DISORDER: ICD-10-CM

## 2017-08-18 DIAGNOSIS — Z13.228 SCREENING FOR ENDOCRINE, METABOLIC AND IMMUNITY DISORDER: ICD-10-CM

## 2017-08-18 DIAGNOSIS — I10 ESSENTIAL HYPERTENSION: ICD-10-CM

## 2017-08-18 DIAGNOSIS — E78.5 DYSLIPIDEMIA: ICD-10-CM

## 2017-08-18 LAB
25(OH)D3 SERPL-MCNC: 21 NG/ML (ref 30–100)
BASOPHILS # BLD AUTO: 1.1 % (ref 0–1.8)
BASOPHILS # BLD: 0.07 K/UL (ref 0–0.12)
EOSINOPHIL # BLD AUTO: 0.21 K/UL (ref 0–0.51)
EOSINOPHIL NFR BLD: 3.3 % (ref 0–6.9)
ERYTHROCYTE [DISTWIDTH] IN BLOOD BY AUTOMATED COUNT: 52.5 FL (ref 35.9–50)
HCT VFR BLD AUTO: 44.8 % (ref 42–52)
HGB BLD-MCNC: 14.6 G/DL (ref 14–18)
IMM GRANULOCYTES # BLD AUTO: 0.02 K/UL (ref 0–0.11)
IMM GRANULOCYTES NFR BLD AUTO: 0.3 % (ref 0–0.9)
LYMPHOCYTES # BLD AUTO: 2.41 K/UL (ref 1–4.8)
LYMPHOCYTES NFR BLD: 38.3 % (ref 22–41)
MCH RBC QN AUTO: 32.2 PG (ref 27–33)
MCHC RBC AUTO-ENTMCNC: 32.6 G/DL (ref 33.7–35.3)
MCV RBC AUTO: 98.7 FL (ref 81.4–97.8)
MONOCYTES # BLD AUTO: 0.49 K/UL (ref 0–0.85)
MONOCYTES NFR BLD AUTO: 7.8 % (ref 0–13.4)
NEUTROPHILS # BLD AUTO: 3.09 K/UL (ref 1.82–7.42)
NEUTROPHILS NFR BLD: 49.2 % (ref 44–72)
NRBC # BLD AUTO: 0 K/UL
NRBC BLD AUTO-RTO: 0 /100 WBC
PLATELET # BLD AUTO: 222 K/UL (ref 164–446)
PMV BLD AUTO: 10.4 FL (ref 9–12.9)
RBC # BLD AUTO: 4.54 M/UL (ref 4.7–6.1)
TSH SERPL DL<=0.005 MIU/L-ACNC: 2.62 UIU/ML (ref 0.3–3.7)
WBC # BLD AUTO: 6.3 K/UL (ref 4.8–10.8)

## 2017-08-18 PROCEDURE — 36415 COLL VENOUS BLD VENIPUNCTURE: CPT

## 2017-08-18 PROCEDURE — 82306 VITAMIN D 25 HYDROXY: CPT | Mod: GA

## 2017-08-18 PROCEDURE — 80061 LIPID PANEL: CPT

## 2017-08-18 PROCEDURE — 84443 ASSAY THYROID STIM HORMONE: CPT

## 2017-08-18 PROCEDURE — 85025 COMPLETE CBC W/AUTO DIFF WBC: CPT

## 2017-08-18 PROCEDURE — 80053 COMPREHEN METABOLIC PANEL: CPT

## 2017-08-19 LAB
ALBUMIN SERPL BCP-MCNC: 4.1 G/DL (ref 3.2–4.9)
ALBUMIN/GLOB SERPL: 1.6 G/DL
ALP SERPL-CCNC: 45 U/L (ref 30–99)
ALT SERPL-CCNC: 24 U/L (ref 2–50)
ANION GAP SERPL CALC-SCNC: 7 MMOL/L (ref 0–11.9)
AST SERPL-CCNC: 22 U/L (ref 12–45)
BILIRUB SERPL-MCNC: 0.6 MG/DL (ref 0.1–1.5)
BUN SERPL-MCNC: 15 MG/DL (ref 8–22)
CALCIUM SERPL-MCNC: 9.3 MG/DL (ref 8.5–10.5)
CHLORIDE SERPL-SCNC: 106 MMOL/L (ref 96–112)
CHOLEST SERPL-MCNC: 154 MG/DL (ref 100–199)
CO2 SERPL-SCNC: 25 MMOL/L (ref 20–33)
CREAT SERPL-MCNC: 0.85 MG/DL (ref 0.5–1.4)
GFR SERPL CREATININE-BSD FRML MDRD: >60 ML/MIN/1.73 M 2
GLOBULIN SER CALC-MCNC: 2.5 G/DL (ref 1.9–3.5)
GLUCOSE SERPL-MCNC: 91 MG/DL (ref 65–99)
HDLC SERPL-MCNC: 59 MG/DL
LDLC SERPL CALC-MCNC: 73 MG/DL
POTASSIUM SERPL-SCNC: 4.4 MMOL/L (ref 3.6–5.5)
PROT SERPL-MCNC: 6.6 G/DL (ref 6–8.2)
SODIUM SERPL-SCNC: 138 MMOL/L (ref 135–145)
TRIGL SERPL-MCNC: 108 MG/DL (ref 0–149)

## 2017-08-25 ENCOUNTER — TELEPHONE (OUTPATIENT)
Dept: MEDICAL GROUP | Facility: PHYSICIAN GROUP | Age: 82
End: 2017-08-25

## 2017-08-25 NOTE — TELEPHONE ENCOUNTER
ESTABLISHED PATIENT PRE-VISIT PLANNING     Note: Patient will not be contacted if there is no indication to call.     1.  Reviewed notes from the last few office visits within the medical group: Yes    2.  If any orders were placed at last visit or intended to be done for this visit (i.e. 6 mos follow-up), do we have Results/Consult Notes?        •  Labs - Labs ordered, completed and results are in chart.       •  Imaging - Imaging was not ordered at last office visit.       •  Referrals - No referrals were ordered at last office visit.    3. Is this appointment scheduled as a Hospital Follow-Up? No    4.  Immunizations were updated in Epic using WebIZ?: Epic matches WebIZ       •  Web Iz Recommendations: Patient is up to date on all vaccines    5.  Patient is due for the following Health Maintenance Topics:   Health Maintenance Due   Topic Date Due   • IMM INFLUENZA (1) 09/01/2017       - Patient is up-to-date on all Health Maintenance topics. No records have been requested at this time.    6.  Patient was informed to arrive 15 min prior to their scheduled appointment and bring in their medication bottles.

## 2017-08-28 ENCOUNTER — OFFICE VISIT (OUTPATIENT)
Dept: MEDICAL GROUP | Facility: PHYSICIAN GROUP | Age: 82
End: 2017-08-28
Payer: MEDICARE

## 2017-08-28 VITALS
OXYGEN SATURATION: 95 % | TEMPERATURE: 97.3 F | BODY MASS INDEX: 27.44 KG/M2 | HEART RATE: 63 BPM | HEIGHT: 71 IN | DIASTOLIC BLOOD PRESSURE: 68 MMHG | SYSTOLIC BLOOD PRESSURE: 112 MMHG | WEIGHT: 196 LBS

## 2017-08-28 DIAGNOSIS — E55.9 VITAMIN D DEFICIENCY: ICD-10-CM

## 2017-08-28 DIAGNOSIS — R53.83 FATIGUE, UNSPECIFIED TYPE: ICD-10-CM

## 2017-08-28 DIAGNOSIS — I48.91 ATRIAL FIBRILLATION, UNSPECIFIED TYPE (HCC): ICD-10-CM

## 2017-08-28 DIAGNOSIS — E78.5 DYSLIPIDEMIA: ICD-10-CM

## 2017-08-28 PROCEDURE — 99214 OFFICE O/P EST MOD 30 MIN: CPT | Performed by: INTERNAL MEDICINE

## 2017-08-28 RX ORDER — CHOLECALCIFEROL (VITAMIN D3) 10 MCG
2000 CAPSULE ORAL DAILY
Qty: 30 CAP | COMMUNITY
Start: 2017-08-28

## 2017-08-28 RX ORDER — TRAZODONE HYDROCHLORIDE 50 MG/1
50-100 TABLET ORAL NIGHTLY PRN
Qty: 60 TAB | Refills: 3 | Status: SHIPPED | OUTPATIENT
Start: 2017-08-28 | End: 2017-12-28 | Stop reason: SDUPTHER

## 2017-08-28 NOTE — ASSESSMENT & PLAN NOTE
Pt was recently found to have AFIB. He follows with cardiology and is on Xarelto for anticoagulation. Pt follows with Dr. Reyes of [facility]. Pt denies chest pain, SOB, LE edema. He does report occasional palpitations.

## 2017-08-28 NOTE — ASSESSMENT & PLAN NOTE
Pt reports that he has difficulty with maintenance of sleep. He has no problem with getting to sleep initially. He is fatigued while driving. He reports that he does not always wake up feeling refreshed from sleep. However, he does report that when he gets a good night of sleep he feels much better. He denies AM headaches. He tries to exercise regularly. He is not taking medication for this.     He feels that he doesn't have ambition to do anything. His wife has brought up concern about possible depression. He feels he is optimistic overall. He has stopped doing hobbies that he used to do in the past but he is still reading and doing meditation. He denies suicidal ideation.

## 2017-08-28 NOTE — LETTER
[facility]  Rigoberto Mandujano M.D.  [address]  [place] [phone]  Fax: [phone]   Authorization for Release/Disclosure of   Protected Health Information   Name: STUART SCHREIBER : 1934 SSN: [id number]   Address: [address] [place] [address] [place] [address] Phone:    [phone] (home)    I authorize the entity listed below to release/disclose the PHI below to:   [facility]/Rigoberto Mandujano M.D. and Rigoberto Mandujano M.D.   Provider or Entity Name:  Dr. Dimitris Reyes - [facility]   Address   [facility], [facility], [facility]   Phone:      Fax:     Reason for request: continuity of care   Information to be released:    [  ] LAST COLONOSCOPY,  including any PATH REPORT and follow-up  [  ] LAST FIT/COLOGUARD RESULT [  ] LAST DEXA  [  ] LAST MAMMOGRAM  [  ] LAST PAP  [  ] LAST LABS [  ] RETINA EXAM REPORT  [  ] IMMUNIZATION RECORDS  [  ] Release all info  [x] clinic notes      [  ] Check here and initial the line next to each item to release ALL health information INCLUDING  _____ Care and treatment for drug and / or alcohol abuse  _____ HIV testing, infection status, or AIDS  _____ Genetic Testing    DATES OF SERVICE OR TIME PERIOD TO BE DISCLOSED: _____________  I understand and acknowledge that:  * This Authorization may be revoked at any time by you in writing, except if your health information has already been used or disclosed.  * Your health information that will be used or disclosed as a result of you signing this authorization could be re-disclosed by the recipient. If this occurs, your re-disclosed health information may no longer be protected by State or Federal laws.  * You may refuse to sign this Authorization. Your refusal will not affect your ability to obtain treatment.  * This Authorization becomes effective upon signing and will  on (date) __________.      If no date is indicated, this Authorization will  one (1) year from the signature date.    Name: Stuart Schreiber    Signature:   Date:          8/28/2017       PLEASE FAX REQUESTED RECORDS BACK TO: [phone]

## 2017-08-28 NOTE — PROGRESS NOTES
Subjective:   Dannie Thurston is a 83 y.o. male here today for AFIB, fatigue, vitamin D deficiency    A-fib (CMS-HCC)  Pt was recently found to have AFIB. He follows with cardiology and is on Xarelto for anticoagulation. Pt follows with Dr. Reyes of [facility]. Pt denies chest pain, SOB, LE edema. He does report occasional palpitations.     Fatigue  Pt reports that he has difficulty with maintenance of sleep. He has no problem with getting to sleep initially. He is fatigued while driving. He reports that he does not always wake up feeling refreshed from sleep. However, he does report that when he gets a good night of sleep he feels much better. He denies AM headaches. He tries to exercise regularly. He is not taking medication for this.     He feels that he doesn't have ambition to do anything. His wife has brought up concern about possible depression. He feels he is optimistic overall. He has stopped doing hobbies that he used to do in the past but he is still reading and doing meditation. He denies suicidal ideation.     Vitamin D deficiency  Mildly low vitamin D seen on labs at 21. Pt is not on supplementation for this.        Current medicines (including changes today)  Current Outpatient Prescriptions   Medication Sig Dispense Refill   • rivaroxaban (XARELTO) 20 MG Tab tablet Take 20 mg by mouth with dinner.     • trazodone (DESYREL) 50 MG Tab Take 1-2 Tabs by mouth at bedtime as needed for Sleep. 60 Tab 3   • Cholecalciferol (EQL VITAMIN D3) 2000 UNIT Cap Take 1 Cap by mouth every day. 30 Cap    • hydrocodone-acetaminophen (NORCO) 7.5-325 MG per tablet Take 1 Tab by mouth every four hours as needed (Pain). 20 Tab 0   • Omega-3 Fatty Acids (FISH OIL) 1200 MG Cap Take  by mouth every day.     • fluticasone (FLONASE) 50 MCG/ACT nasal spray Spray 1 Spray in nose every day. 16 g 3   • finasteride (PROSCAR) 5 MG TABS Take 5 mg by mouth every day.     • tamsulosin (FLOMAX) 0.4 MG capsule Take 0.4 mg by  "mouth ONE-HALF HOUR AFTER BREAKFAST. 2 tablets     • lisinopril (PRINIVIL) 20 MG TABS Take 30 mg by mouth every day.     • simvastatin (ZOCOR) 40 MG TABS Take 40 mg by mouth every evening.     • dorzolamide-timolol (COSOPT) 2-0.5 % SOLN Place 1 Drop in left eye 2 times a day.     • Ascorbic Acid (VITAMIN C PO) Take 500 mg by mouth.     • ASPIRIN PO Take 81 mg by mouth.     • Multiple Vitamins-Minerals (CENTRUM SILVER PO) Take  by mouth.     • GLUCOSAMINE PO Take 1,500 mg by mouth.     • Psyllium (METAMUCIL PO) Take  by mouth every day.     • Polyethylene Glycol 3350 (MIRALAX PO) Take  by mouth as needed.       No current facility-administered medications for this visit.      He  has a past medical history of Arthritis; Bowel habit changes; BPH (benign prostatic hyperplasia); CATARACT; Glaucoma; Heart murmur; Hyperlipidemia; Hypertension; and Pain.    ROS   No chest pain, no shortness of breath     Objective:     Blood pressure 112/68, pulse 63, temperature 36.3 °C (97.3 °F), height 1.803 m (5' 11\"), weight 88.9 kg (196 lb), SpO2 95 %. Body mass index is 27.34 kg/m².   Physical Exam:  Constitutional: Alert & oriented, no acute distress  Eye: Conjunctiva clear, lids normal, no discharge  ENMT: Lips without lesions, normal external nose and ears  Respiratory: Unlabored respiratory effort, lungs clear to auscultation, no wheezes, no ronchi  Cardiovascular: Irregularly irregular, no edema  Skin: Warm, dry, good turgor, no rashes in visible areas  Neuro: No overt focal neurologic deficits  Psych: Normal mood and affect      Assessment and Plan:   The following treatment plan was discussed    1. Atrial fibrillation, unspecified type (CMS-HCC)  Continue follow up with cardiology. Records request sent to cardiology  - COMP METABOLIC PANEL; Future    2. Fatigue, unspecified type  Will try trazodone to see if improved sleep helps fatigue. Will also order vitamin B12 level to be done prior to follow up.   - trazodone (DESYREL) " 50 MG Tab; Take 1-2 Tabs by mouth at bedtime as needed for Sleep.  Dispense: 60 Tab; Refill: 3  - CBC WITH DIFFERENTIAL; Future  - VITAMIN B12; Future    3. Vitamin D deficiency  Pt will start on OTC vitamin D supplementation at 2000 IU dailyi  - VITAMIN D,25 HYDROXY; Future    4. Dyslipidemia  Continue current medication, recheck prior to follow up  - LIPID PROFILE; Future    Followup: Return in about 3 months (around 11/28/2017).    Please note that this dictation was created using voice recognition software. I have made every reasonable attempt to correct obvious errors, but I expect that there are errors of grammar and possibly content that I did not discover before finalizing the note.

## 2017-09-01 ENCOUNTER — NON-PROVIDER VISIT (OUTPATIENT)
Dept: MEDICAL GROUP | Facility: PHYSICIAN GROUP | Age: 82
End: 2017-09-01
Payer: MEDICARE

## 2017-09-01 DIAGNOSIS — Z23 NEED FOR VACCINATION: ICD-10-CM

## 2017-09-01 PROCEDURE — 90662 IIV NO PRSV INCREASED AG IM: CPT | Performed by: INTERNAL MEDICINE

## 2017-09-01 PROCEDURE — G0008 ADMIN INFLUENZA VIRUS VAC: HCPCS | Performed by: INTERNAL MEDICINE

## 2017-09-11 ENCOUNTER — HOSPITAL ENCOUNTER (OUTPATIENT)
Dept: HOSPITAL 8 - STAR | Age: 82
Discharge: HOME | End: 2017-09-11
Attending: SURGERY
Payer: MEDICARE

## 2017-09-11 DIAGNOSIS — Z01.818: Primary | ICD-10-CM

## 2017-09-11 DIAGNOSIS — R94.31: ICD-10-CM

## 2017-09-11 DIAGNOSIS — I44.7: ICD-10-CM

## 2017-09-11 DIAGNOSIS — I48.91: ICD-10-CM

## 2017-09-11 LAB
AST SERPL-CCNC: 24 U/L (ref 15–37)
BUN SERPL-MCNC: 18 MG/DL (ref 7–18)

## 2017-09-11 PROCEDURE — 80053 COMPREHEN METABOLIC PANEL: CPT

## 2017-09-11 PROCEDURE — 36415 COLL VENOUS BLD VENIPUNCTURE: CPT

## 2017-09-11 PROCEDURE — 93005 ELECTROCARDIOGRAM TRACING: CPT

## 2017-09-20 ENCOUNTER — HOSPITAL ENCOUNTER (OUTPATIENT)
Dept: HOSPITAL 8 - OUT | Age: 82
Discharge: HOME | End: 2017-09-20
Attending: SURGERY
Payer: MEDICARE

## 2017-09-20 VITALS — DIASTOLIC BLOOD PRESSURE: 76 MMHG | SYSTOLIC BLOOD PRESSURE: 120 MMHG

## 2017-09-20 VITALS — WEIGHT: 187.39 LBS | HEIGHT: 71 IN | BODY MASS INDEX: 26.23 KG/M2

## 2017-09-20 DIAGNOSIS — I71.4: ICD-10-CM

## 2017-09-20 DIAGNOSIS — I48.91: ICD-10-CM

## 2017-09-20 DIAGNOSIS — K40.20: Primary | ICD-10-CM

## 2017-09-20 DIAGNOSIS — I10: ICD-10-CM

## 2017-09-20 PROCEDURE — C1727 CATH, BAL TIS DIS, NON-VAS: HCPCS

## 2017-09-20 PROCEDURE — C1781 MESH (IMPLANTABLE): HCPCS

## 2017-09-20 PROCEDURE — 49650 LAP ING HERNIA REPAIR INIT: CPT

## 2017-11-03 ENCOUNTER — TELEPHONE (OUTPATIENT)
Dept: MEDICAL GROUP | Facility: PHYSICIAN GROUP | Age: 82
End: 2017-11-03

## 2017-11-03 DIAGNOSIS — H91.90 HEARING LOSS, UNSPECIFIED HEARING LOSS TYPE, UNSPECIFIED LATERALITY: ICD-10-CM

## 2017-11-03 NOTE — TELEPHONE ENCOUNTER
1. Caller Name: Dannie Thurston                                         Call Back Number: [phone] (home)       Patient approves a detailed voicemail message: N\A    2. SPECIFIC Action To Be Taken: Referral needed otologist    3. Diagnosis/Clinical Reason for Request: loss of hearing  4. Specialty & Provider Name/Lab/Imaging Location: Dr. Derrick Servin    5. Is appointment scheduled for requested order/referral: yes -     Patient informed they will receive a return phone call from the office ONLY if there are any questions before processing their request. Advised to call back if they haven't received a call from the referral department in 5 days.

## 2017-12-13 ENCOUNTER — HOSPITAL ENCOUNTER (OUTPATIENT)
Dept: HOSPITAL 8 - CACL | Age: 82
End: 2017-12-13
Attending: INTERNAL MEDICINE
Payer: MEDICARE

## 2017-12-13 VITALS — HEIGHT: 71 IN | BODY MASS INDEX: 26.79 KG/M2 | WEIGHT: 191.36 LBS

## 2017-12-13 VITALS — DIASTOLIC BLOOD PRESSURE: 85 MMHG | SYSTOLIC BLOOD PRESSURE: 112 MMHG

## 2017-12-13 DIAGNOSIS — Z88.8: ICD-10-CM

## 2017-12-13 DIAGNOSIS — Z79.82: ICD-10-CM

## 2017-12-13 DIAGNOSIS — Z87.891: ICD-10-CM

## 2017-12-13 DIAGNOSIS — E78.00: ICD-10-CM

## 2017-12-13 DIAGNOSIS — I10: ICD-10-CM

## 2017-12-13 DIAGNOSIS — I48.91: Primary | ICD-10-CM

## 2017-12-13 PROCEDURE — 92960 CARDIOVERSION ELECTRIC EXT: CPT

## 2017-12-18 ENCOUNTER — TELEPHONE (OUTPATIENT)
Dept: MEDICAL GROUP | Facility: PHYSICIAN GROUP | Age: 82
End: 2017-12-18

## 2017-12-18 NOTE — TELEPHONE ENCOUNTER
VOICEMAIL  1. Caller Name: Dannie Thurston                        Call Back Number: [phone] (home)       2. Message: Patient received letter that Dr. Mandujano is leaving, patient called to verify his appointment on the DEC 28th, I called back verifying his appointment      3. Patient approves office to leave a detailed voicemail/MyChart message: N\A

## 2017-12-20 ENCOUNTER — HOSPITAL ENCOUNTER (OUTPATIENT)
Dept: LAB | Facility: MEDICAL CENTER | Age: 82
End: 2017-12-20
Attending: INTERNAL MEDICINE
Payer: MEDICARE

## 2017-12-20 DIAGNOSIS — E55.9 VITAMIN D DEFICIENCY: ICD-10-CM

## 2017-12-20 DIAGNOSIS — I48.91 ATRIAL FIBRILLATION, UNSPECIFIED TYPE (HCC): ICD-10-CM

## 2017-12-20 DIAGNOSIS — E78.5 DYSLIPIDEMIA: ICD-10-CM

## 2017-12-20 DIAGNOSIS — R53.83 FATIGUE, UNSPECIFIED TYPE: ICD-10-CM

## 2017-12-20 LAB
25(OH)D3 SERPL-MCNC: 27 NG/ML (ref 30–100)
ALBUMIN SERPL BCP-MCNC: 4.2 G/DL (ref 3.2–4.9)
ALBUMIN/GLOB SERPL: 1.7 G/DL
ALP SERPL-CCNC: 43 U/L (ref 30–99)
ALT SERPL-CCNC: 24 U/L (ref 2–50)
ANION GAP SERPL CALC-SCNC: 7 MMOL/L (ref 0–11.9)
AST SERPL-CCNC: 27 U/L (ref 12–45)
BASOPHILS # BLD AUTO: 1.5 % (ref 0–1.8)
BASOPHILS # BLD: 0.1 K/UL (ref 0–0.12)
BILIRUB SERPL-MCNC: 0.8 MG/DL (ref 0.1–1.5)
BUN SERPL-MCNC: 14 MG/DL (ref 8–22)
CALCIUM SERPL-MCNC: 9 MG/DL (ref 8.5–10.5)
CHLORIDE SERPL-SCNC: 107 MMOL/L (ref 96–112)
CHOLEST SERPL-MCNC: 147 MG/DL (ref 100–199)
CO2 SERPL-SCNC: 26 MMOL/L (ref 20–33)
CREAT SERPL-MCNC: 0.83 MG/DL (ref 0.5–1.4)
EOSINOPHIL # BLD AUTO: 0.19 K/UL (ref 0–0.51)
EOSINOPHIL NFR BLD: 2.9 % (ref 0–6.9)
ERYTHROCYTE [DISTWIDTH] IN BLOOD BY AUTOMATED COUNT: 54.7 FL (ref 35.9–50)
GFR SERPL CREATININE-BSD FRML MDRD: >60 ML/MIN/1.73 M 2
GLOBULIN SER CALC-MCNC: 2.5 G/DL (ref 1.9–3.5)
GLUCOSE SERPL-MCNC: 94 MG/DL (ref 65–99)
HCT VFR BLD AUTO: 43.4 % (ref 42–52)
HDLC SERPL-MCNC: 65 MG/DL
HGB BLD-MCNC: 13.9 G/DL (ref 14–18)
IMM GRANULOCYTES # BLD AUTO: 0.01 K/UL (ref 0–0.11)
IMM GRANULOCYTES NFR BLD AUTO: 0.2 % (ref 0–0.9)
LDLC SERPL CALC-MCNC: 67 MG/DL
LYMPHOCYTES # BLD AUTO: 2.51 K/UL (ref 1–4.8)
LYMPHOCYTES NFR BLD: 37.7 % (ref 22–41)
MCH RBC QN AUTO: 32 PG (ref 27–33)
MCHC RBC AUTO-ENTMCNC: 32 G/DL (ref 33.7–35.3)
MCV RBC AUTO: 99.8 FL (ref 81.4–97.8)
MONOCYTES # BLD AUTO: 0.59 K/UL (ref 0–0.85)
MONOCYTES NFR BLD AUTO: 8.9 % (ref 0–13.4)
NEUTROPHILS # BLD AUTO: 3.25 K/UL (ref 1.82–7.42)
NEUTROPHILS NFR BLD: 48.8 % (ref 44–72)
NRBC # BLD AUTO: 0 K/UL
NRBC BLD-RTO: 0 /100 WBC
PLATELET # BLD AUTO: 222 K/UL (ref 164–446)
PMV BLD AUTO: 10.5 FL (ref 9–12.9)
POTASSIUM SERPL-SCNC: 4.3 MMOL/L (ref 3.6–5.5)
PROT SERPL-MCNC: 6.7 G/DL (ref 6–8.2)
RBC # BLD AUTO: 4.35 M/UL (ref 4.7–6.1)
SODIUM SERPL-SCNC: 140 MMOL/L (ref 135–145)
TRIGL SERPL-MCNC: 74 MG/DL (ref 0–149)
VIT B12 SERPL-MCNC: 570 PG/ML (ref 211–911)
WBC # BLD AUTO: 6.7 K/UL (ref 4.8–10.8)

## 2017-12-20 PROCEDURE — 82306 VITAMIN D 25 HYDROXY: CPT

## 2017-12-20 PROCEDURE — 36415 COLL VENOUS BLD VENIPUNCTURE: CPT

## 2017-12-20 PROCEDURE — 80061 LIPID PANEL: CPT

## 2017-12-20 PROCEDURE — 85025 COMPLETE CBC W/AUTO DIFF WBC: CPT

## 2017-12-20 PROCEDURE — 82607 VITAMIN B-12: CPT

## 2017-12-20 PROCEDURE — 80053 COMPREHEN METABOLIC PANEL: CPT

## 2017-12-28 ENCOUNTER — OFFICE VISIT (OUTPATIENT)
Dept: MEDICAL GROUP | Facility: PHYSICIAN GROUP | Age: 82
End: 2017-12-28
Payer: MEDICARE

## 2017-12-28 VITALS
RESPIRATION RATE: 16 BRPM | SYSTOLIC BLOOD PRESSURE: 112 MMHG | WEIGHT: 200 LBS | OXYGEN SATURATION: 95 % | DIASTOLIC BLOOD PRESSURE: 86 MMHG | TEMPERATURE: 97.9 F | HEART RATE: 84 BPM | BODY MASS INDEX: 27.89 KG/M2

## 2017-12-28 DIAGNOSIS — R53.83 FATIGUE, UNSPECIFIED TYPE: ICD-10-CM

## 2017-12-28 DIAGNOSIS — I10 ESSENTIAL HYPERTENSION: ICD-10-CM

## 2017-12-28 DIAGNOSIS — G47.00 INSOMNIA, UNSPECIFIED TYPE: ICD-10-CM

## 2017-12-28 DIAGNOSIS — I48.91 ATRIAL FIBRILLATION, UNSPECIFIED TYPE (HCC): ICD-10-CM

## 2017-12-28 PROCEDURE — 99214 OFFICE O/P EST MOD 30 MIN: CPT | Performed by: INTERNAL MEDICINE

## 2017-12-28 RX ORDER — TRAZODONE HYDROCHLORIDE 50 MG/1
50-100 TABLET ORAL NIGHTLY PRN
Qty: 180 TAB | Refills: 1 | Status: SHIPPED | OUTPATIENT
Start: 2017-12-28 | End: 2021-11-17

## 2017-12-28 NOTE — ASSESSMENT & PLAN NOTE
Pt in on lisinopril 10 mg daily for HTN, this was decreased recently by patient's cardiologist due to occasional dizziness. BP is well controlled today.     He continues to get occasional dizziness but medication change only started today.     Denies chest pain, shortness of breath, headache, blurry vision.

## 2017-12-28 NOTE — PROGRESS NOTES
Subjective:   Dannie Thurston is a 83 y.o. male here today for HTN, insomnia, AFIB    HTN (hypertension)  Pt in on lisinopril 10 mg daily for HTN, this was decreased recently by patient's cardiologist due to occasional dizziness. BP is well controlled today.     He continues to get occasional dizziness but medication change only started today.     Denies chest pain, shortness of breath, headache, blurry vision.     Insomnia  Pt was started on trazodone at last visit for insomnia and fatigue associated with it. He reports that he has been taking it every night and has had some improvement in symptoms with it. He is satisfied with the sleep he is getting now on the medication.     A-fib (CMS-[facility], located within [facility])  Pt is following with Dr. Reyes of [facility]. He is on xarelto for this and is pending an echocardiogram. He gets occasional fluttering/palpitations. He was seen yesterday by cardiology. treatment will be determined after the echocardiogram. He had a cardioversion a few weeks ago but the results were only transient.        Current medicines (including changes today)  Current Outpatient Prescriptions   Medication Sig Dispense Refill   • rivaroxaban (XARELTO) 20 MG Tab tablet Take 20 mg by mouth with dinner.     • trazodone (DESYREL) 50 MG Tab Take 1-2 Tabs by mouth at bedtime as needed for Sleep. 60 Tab 3   • hydrocodone-acetaminophen (NORCO) 7.5-325 MG per tablet Take 1 Tab by mouth every four hours as needed (Pain). 20 Tab 0   • Omega-3 Fatty Acids (FISH OIL) 1200 MG Cap Take  by mouth every day.     • fluticasone (FLONASE) 50 MCG/ACT nasal spray Spray 1 Spray in nose every day. 16 g 3   • finasteride (PROSCAR) 5 MG TABS Take 5 mg by mouth every day.     • tamsulosin (FLOMAX) 0.4 MG capsule Take 0.4 mg by mouth ONE-HALF HOUR AFTER BREAKFAST. 2 tablets     • lisinopril (PRINIVIL) 20 MG TABS Take 30 mg by mouth every day.     • simvastatin (ZOCOR) 40 MG TABS Take 40 mg by mouth every evening.     •  dorzolamide-timolol (COSOPT) 2-0.5 % SOLN Place 1 Drop in left eye 2 times a day.     • Ascorbic Acid (VITAMIN C PO) Take 500 mg by mouth.     • ASPIRIN PO Take 81 mg by mouth.     • Multiple Vitamins-Minerals (CENTRUM SILVER PO) Take  by mouth.     • GLUCOSAMINE PO Take 1,500 mg by mouth.     • Psyllium (METAMUCIL PO) Take  by mouth every day.     • Polyethylene Glycol 3350 (MIRALAX PO) Take  by mouth as needed.     • Cholecalciferol (EQL VITAMIN D3) 2000 UNIT Cap Take 1 Cap by mouth every day. 30 Cap      No current facility-administered medications for this visit.      He  has a past medical history of Arthritis; Bowel habit changes; BPH (benign prostatic hyperplasia); CATARACT; Glaucoma; Heart murmur; Hyperlipidemia; Hypertension; and Pain.    ROS   No chest pain, no shortness of breath, no headache     Objective:     Blood pressure 112/86, pulse 84, temperature 36.6 °C (97.9 °F), resp. rate 16, weight 90.7 kg (200 lb), SpO2 95 %. Body mass index is 27.89 kg/m².   Physical Exam:  Constitutional: Alert & oriented, no acute distress  Eye: Conjunctiva clear, lids normal, no discharge  ENMT: Lips without lesions, normal external nose and ears  Neck: Trachea midline, no masses, no thyromegaly. No cervical or supraclavicular lymphadenopathy  Respiratory: Unlabored respiratory effort, lungs clear to auscultation, no wheezes, no ronchi  Cardiovascular: Irregular rhythm, no murmurs heard  Skin: Warm, dry, good turgor, no rashes in visible areas  Neuro: No overt focal neurologic deficits  Psych: Normal mood and affect    Assessment and Plan:   The following treatment plan was discussed    1. Essential hypertension  Well controlled, pt is now on lower dose of lisinopril due to dizziness per cardiology. Can assess response at follow up visit    2. Insomnia, unspecified type  Well controlled on trazodone. Continue current medication    3. Atrial fibrillation, unspecified type (CMS-HCC)  Continue current medications and  treatment per cardiology. Patient is pending echocardiogram and further treatment will be decided by cardiology after this test is done    Followup: Return in about 1 month (around 1/28/2018) for Long - to establish.    Please note that this dictation was created using voice recognition software. I have made every reasonable attempt to correct obvious errors, but I expect that there are errors of grammar and possibly content that I did not discover before finalizing the note.

## 2017-12-28 NOTE — ASSESSMENT & PLAN NOTE
Pt was started on trazodone at last visit for insomnia and fatigue associated with it. He reports that he has been taking it every night and has had some improvement in symptoms with it. He is satisfied with the sleep he is getting now on the medication.

## 2018-01-17 ENCOUNTER — HOSPITAL ENCOUNTER (OUTPATIENT)
Dept: HOSPITAL 8 - CFH | Age: 83
Discharge: HOME | End: 2018-01-17
Attending: NURSE PRACTITIONER
Payer: MEDICARE

## 2018-01-17 DIAGNOSIS — I08.3: ICD-10-CM

## 2018-01-17 DIAGNOSIS — I48.0: Primary | ICD-10-CM

## 2018-01-17 DIAGNOSIS — I10: ICD-10-CM

## 2018-01-17 PROCEDURE — 93306 TTE W/DOPPLER COMPLETE: CPT

## 2018-02-12 ENCOUNTER — HOSPITAL ENCOUNTER (OUTPATIENT)
Dept: LAB | Facility: MEDICAL CENTER | Age: 83
End: 2018-02-12
Attending: NURSE PRACTITIONER
Payer: MEDICARE

## 2018-02-12 LAB
ALBUMIN SERPL BCP-MCNC: 4.1 G/DL (ref 3.2–4.9)
ALBUMIN/GLOB SERPL: 1.6 G/DL
ALP SERPL-CCNC: 42 U/L (ref 30–99)
ALT SERPL-CCNC: 30 U/L (ref 2–50)
ANION GAP SERPL CALC-SCNC: 7 MMOL/L (ref 0–11.9)
AST SERPL-CCNC: 23 U/L (ref 12–45)
BASOPHILS # BLD AUTO: 1.2 % (ref 0–1.8)
BASOPHILS # BLD: 0.08 K/UL (ref 0–0.12)
BILIRUB SERPL-MCNC: 0.7 MG/DL (ref 0.1–1.5)
BUN SERPL-MCNC: 14 MG/DL (ref 8–22)
CALCIUM SERPL-MCNC: 9.5 MG/DL (ref 8.5–10.5)
CHLORIDE SERPL-SCNC: 105 MMOL/L (ref 96–112)
CO2 SERPL-SCNC: 28 MMOL/L (ref 20–33)
CREAT SERPL-MCNC: 0.94 MG/DL (ref 0.5–1.4)
EOSINOPHIL # BLD AUTO: 0.24 K/UL (ref 0–0.51)
EOSINOPHIL NFR BLD: 3.6 % (ref 0–6.9)
ERYTHROCYTE [DISTWIDTH] IN BLOOD BY AUTOMATED COUNT: 53.6 FL (ref 35.9–50)
GLOBULIN SER CALC-MCNC: 2.6 G/DL (ref 1.9–3.5)
GLUCOSE SERPL-MCNC: 100 MG/DL (ref 65–99)
HCT VFR BLD AUTO: 44.8 % (ref 42–52)
HGB BLD-MCNC: 14.3 G/DL (ref 14–18)
IMM GRANULOCYTES # BLD AUTO: 0.02 K/UL (ref 0–0.11)
IMM GRANULOCYTES NFR BLD AUTO: 0.3 % (ref 0–0.9)
LYMPHOCYTES # BLD AUTO: 2.92 K/UL (ref 1–4.8)
LYMPHOCYTES NFR BLD: 43.7 % (ref 22–41)
MCH RBC QN AUTO: 31.8 PG (ref 27–33)
MCHC RBC AUTO-ENTMCNC: 31.9 G/DL (ref 33.7–35.3)
MCV RBC AUTO: 99.8 FL (ref 81.4–97.8)
MONOCYTES # BLD AUTO: 0.64 K/UL (ref 0–0.85)
MONOCYTES NFR BLD AUTO: 9.6 % (ref 0–13.4)
NEUTROPHILS # BLD AUTO: 2.78 K/UL (ref 1.82–7.42)
NEUTROPHILS NFR BLD: 41.6 % (ref 44–72)
NRBC # BLD AUTO: 0 K/UL
NRBC BLD-RTO: 0 /100 WBC
PLATELET # BLD AUTO: 198 K/UL (ref 164–446)
PMV BLD AUTO: 10.3 FL (ref 9–12.9)
POTASSIUM SERPL-SCNC: 4.5 MMOL/L (ref 3.6–5.5)
PROT SERPL-MCNC: 6.7 G/DL (ref 6–8.2)
RBC # BLD AUTO: 4.49 M/UL (ref 4.7–6.1)
SODIUM SERPL-SCNC: 140 MMOL/L (ref 135–145)
T4 FREE SERPL-MCNC: 0.86 NG/DL (ref 0.53–1.43)
TSH SERPL DL<=0.005 MIU/L-ACNC: 2.67 UIU/ML (ref 0.38–5.33)
WBC # BLD AUTO: 6.7 K/UL (ref 4.8–10.8)

## 2018-02-12 PROCEDURE — 84443 ASSAY THYROID STIM HORMONE: CPT

## 2018-02-12 PROCEDURE — 36415 COLL VENOUS BLD VENIPUNCTURE: CPT

## 2018-02-12 PROCEDURE — 84439 ASSAY OF FREE THYROXINE: CPT

## 2018-02-12 PROCEDURE — 80053 COMPREHEN METABOLIC PANEL: CPT

## 2018-02-12 PROCEDURE — 85025 COMPLETE CBC W/AUTO DIFF WBC: CPT

## 2018-02-23 ENCOUNTER — HOSPITAL ENCOUNTER (OUTPATIENT)
Dept: HOSPITAL 8 - CFH | Age: 83
End: 2018-02-23
Attending: NURSE PRACTITIONER
Payer: MEDICARE

## 2018-02-23 DIAGNOSIS — I48.0: Primary | ICD-10-CM

## 2018-02-23 PROCEDURE — 78452 HT MUSCLE IMAGE SPECT MULT: CPT

## 2018-02-23 PROCEDURE — 93017 CV STRESS TEST TRACING ONLY: CPT

## 2018-02-23 PROCEDURE — A9502 TC99M TETROFOSMIN: HCPCS

## 2018-04-06 ENCOUNTER — HOSPITAL ENCOUNTER (OUTPATIENT)
Dept: HOSPITAL 8 - CVU | Age: 83
Discharge: HOME | End: 2018-04-06
Attending: INTERNAL MEDICINE
Payer: MEDICARE

## 2018-04-06 DIAGNOSIS — I71.4: Primary | ICD-10-CM

## 2018-04-06 DIAGNOSIS — I10: ICD-10-CM

## 2018-04-06 DIAGNOSIS — I48.91: ICD-10-CM

## 2018-04-06 DIAGNOSIS — Z87.891: ICD-10-CM

## 2018-04-06 PROCEDURE — 93978 VASCULAR STUDY: CPT

## 2018-04-18 ENCOUNTER — APPOINTMENT (RX ONLY)
Dept: URBAN - METROPOLITAN AREA CLINIC 4 | Facility: CLINIC | Age: 83
Setting detail: DERMATOLOGY
End: 2018-04-18

## 2018-04-18 DIAGNOSIS — L82.0 INFLAMED SEBORRHEIC KERATOSIS: ICD-10-CM

## 2018-04-18 DIAGNOSIS — L82.1 OTHER SEBORRHEIC KERATOSIS: ICD-10-CM

## 2018-04-18 PROBLEM — L57.0 ACTINIC KERATOSIS: Status: ACTIVE | Noted: 2018-04-18

## 2018-04-18 PROCEDURE — 99212 OFFICE O/P EST SF 10 MIN: CPT

## 2018-04-18 PROCEDURE — ? COUNSELING

## 2018-04-18 PROCEDURE — ? LIQUID NITROGEN

## 2018-04-18 ASSESSMENT — LOCATION DETAILED DESCRIPTION DERM
LOCATION DETAILED: LEFT SUPERIOR FOREHEAD
LOCATION DETAILED: LEFT INFERIOR LATERAL FOREHEAD
LOCATION DETAILED: MEDIAL FRONTAL SCALP
LOCATION DETAILED: RIGHT SUPERIOR FOREHEAD
LOCATION DETAILED: INFERIOR MID FOREHEAD
LOCATION DETAILED: RIGHT FOREHEAD

## 2018-04-18 ASSESSMENT — LOCATION SIMPLE DESCRIPTION DERM
LOCATION SIMPLE: LEFT FOREHEAD
LOCATION SIMPLE: INFERIOR FOREHEAD
LOCATION SIMPLE: RIGHT FOREHEAD
LOCATION SIMPLE: FRONTAL SCALP

## 2018-04-18 ASSESSMENT — LOCATION ZONE DERM
LOCATION ZONE: SCALP
LOCATION ZONE: FACE

## 2018-04-18 NOTE — PROCEDURE: LIQUID NITROGEN
Medical Necessity Information: It is in your best interest to select a reason for this procedure from the list below. All of these items fulfill various CMS LCD requirements except the new and changing color options.
Include Z78.9 (Other Specified Conditions Influencing Health Status) As An Associated Diagnosis?: No
Detail Level: Detailed
Medical Necessity Clause: This procedure was medically necessary because the lesions that were treated were:
Post-Care Instructions: I reviewed with the patient in detail post-care instructions. Patient is to wear sunprotection, and avoid picking at any of the treated lesions. Pt may apply Vaseline to crusted or scabbing areas.
Consent: The patient's consent was obtained including but not limited to risks of crusting, scabbing, blistering, scarring, darker or lighter pigmentary change, recurrence, incomplete removal and infection.

## 2018-08-21 ENCOUNTER — APPOINTMENT (RX ONLY)
Dept: URBAN - METROPOLITAN AREA CLINIC 4 | Facility: CLINIC | Age: 83
Setting detail: DERMATOLOGY
End: 2018-08-21

## 2018-08-21 DIAGNOSIS — L57.0 ACTINIC KERATOSIS: ICD-10-CM

## 2018-08-21 DIAGNOSIS — Z85.828 PERSONAL HISTORY OF OTHER MALIGNANT NEOPLASM OF SKIN: ICD-10-CM

## 2018-08-21 DIAGNOSIS — D22 MELANOCYTIC NEVI: ICD-10-CM

## 2018-08-21 DIAGNOSIS — L81.4 OTHER MELANIN HYPERPIGMENTATION: ICD-10-CM

## 2018-08-21 DIAGNOSIS — L82.1 OTHER SEBORRHEIC KERATOSIS: ICD-10-CM

## 2018-08-21 DIAGNOSIS — D18.0 HEMANGIOMA: ICD-10-CM

## 2018-08-21 PROBLEM — D22.5 MELANOCYTIC NEVI OF TRUNK: Status: ACTIVE | Noted: 2018-08-21

## 2018-08-21 PROBLEM — D18.01 HEMANGIOMA OF SKIN AND SUBCUTANEOUS TISSUE: Status: ACTIVE | Noted: 2018-08-21

## 2018-08-21 PROBLEM — D48.5 NEOPLASM OF UNCERTAIN BEHAVIOR OF SKIN: Status: ACTIVE | Noted: 2018-08-21

## 2018-08-21 PROCEDURE — 99213 OFFICE O/P EST LOW 20 MIN: CPT | Mod: 25

## 2018-08-21 PROCEDURE — 11100: CPT | Mod: 59

## 2018-08-21 PROCEDURE — ? LIQUID NITROGEN

## 2018-08-21 PROCEDURE — ? BIOPSY BY SHAVE METHOD

## 2018-08-21 PROCEDURE — 17000 DESTRUCT PREMALG LESION: CPT

## 2018-08-21 ASSESSMENT — LOCATION ZONE DERM
LOCATION ZONE: SCALP
LOCATION ZONE: TRUNK
LOCATION ZONE: FACE
LOCATION ZONE: ARM
LOCATION ZONE: NOSE

## 2018-08-21 ASSESSMENT — LOCATION SIMPLE DESCRIPTION DERM
LOCATION SIMPLE: LEFT UPPER BACK
LOCATION SIMPLE: RIGHT LOWER BACK
LOCATION SIMPLE: LEFT UPPER ARM
LOCATION SIMPLE: UPPER BACK
LOCATION SIMPLE: RIGHT CHEEK
LOCATION SIMPLE: RIGHT UPPER ARM
LOCATION SIMPLE: NOSE
LOCATION SIMPLE: LEFT LOWER BACK
LOCATION SIMPLE: LEFT CHEEK
LOCATION SIMPLE: ANTERIOR SCALP

## 2018-08-21 ASSESSMENT — LOCATION DETAILED DESCRIPTION DERM
LOCATION DETAILED: LEFT SUPERIOR UPPER BACK
LOCATION DETAILED: RIGHT INFERIOR MEDIAL MALAR CHEEK
LOCATION DETAILED: LEFT PROXIMAL POSTERIOR UPPER ARM
LOCATION DETAILED: RIGHT SUPERIOR MEDIAL MIDBACK
LOCATION DETAILED: MID-FRONTAL SCALP
LOCATION DETAILED: INFERIOR THORACIC SPINE
LOCATION DETAILED: LEFT INFERIOR LATERAL MALAR CHEEK
LOCATION DETAILED: LEFT SUPERIOR MEDIAL MIDBACK
LOCATION DETAILED: NASAL TIP
LOCATION DETAILED: RIGHT PROXIMAL POSTERIOR UPPER ARM

## 2018-08-21 NOTE — PROCEDURE: LIQUID NITROGEN
Number Of Freeze-Thaw Cycles: 1 freeze-thaw cycle
Consent: The patient's consent was obtained including but not limited to risks of crusting, scabbing, blistering, scarring, darker or lighter pigmentary change, recurrence, incomplete removal and infection.
Duration Of Freeze Thaw-Cycle (Seconds): 5
Post-Care Instructions: I reviewed with the patient in detail post-care instructions. Patient is to wear sunprotection, and avoid picking at any of the treated lesions. Pt may apply Vaseline to crusted or scabbing areas.
Render Post-Care Instructions In Note?: no
Detail Level: Detailed

## 2018-08-21 NOTE — PROCEDURE: REASSURANCE
Detail Level: Detailed
Detail Level: Zone
Include Location In Plan?: No
Include Location In Plan?: Yes
Detail Level: Generalized

## 2018-08-21 NOTE — PROCEDURE: BIOPSY BY SHAVE METHOD
Destruction After The Procedure: No
Notification Instructions: Patient will be notified of biopsy results. However, patient instructed to call the office if not contacted within 2 weeks.
Additional Anesthesia Volume In Cc (Will Not Render If 0): 0
Biopsy Method: Personna blade
Billing Type: Third-Party Bill
Anticipated Plan (Based On Presumed Biopsy Results): Imiquimod if positive
Biopsy Type: H and E
Dressing: Band-Aid
Electrodesiccation Text: The wound bed was treated with electrodesiccation after the biopsy was performed.
Consent: Written consent was obtained and risks were reviewed including but not limited to scarring, infection, bleeding, scabbing, incomplete removal, nerve damage and allergy to anesthesia.
Detail Level: Detailed
Post-Care Instructions: I reviewed with the patient in detail post-care instructions. Patient is to keep the biopsy site dry overnight, and then apply vasaline twice daily until healed.
Lab: [id number]
Render Post-Care Instructions In Note?: yes
Anesthesia Volume In Cc: 0.5
Depth Of Biopsy: dermis
Curettage Text: The wound bed was treated with curettage after the biopsy was performed.
Hemostasis: Drysol and Electrocautery
Lab Facility: 
Electrodesiccation And Curettage Text: The wound bed was treated with electrodesiccation and curettage after the biopsy was performed.
Type Of Destruction Used: Curettage
Wound Care: Vaseline
Anesthesia Type: 1% lidocaine with epinephrine and a 1:10 solution of 8.4% sodium bicarbonate

## 2018-08-27 ENCOUNTER — HOSPITAL ENCOUNTER (OUTPATIENT)
Dept: HOSPITAL 8 - LAB | Age: 83
Discharge: HOME | End: 2018-08-27
Attending: INTERNAL MEDICINE
Payer: MEDICARE

## 2018-08-27 DIAGNOSIS — I71.4: ICD-10-CM

## 2018-08-27 DIAGNOSIS — E78.00: Primary | ICD-10-CM

## 2018-08-27 DIAGNOSIS — I10: ICD-10-CM

## 2018-08-27 LAB
ALBUMIN SERPL-MCNC: 3.8 G/DL (ref 3.4–5)
ALP SERPL-CCNC: 50 U/L (ref 45–117)
ALT SERPL-CCNC: 34 U/L (ref 12–78)
ANION GAP SERPL CALC-SCNC: 3 MMOL/L (ref 5–15)
BASOPHILS # BLD AUTO: 0.07 X10^3/UL (ref 0–0.1)
BASOPHILS NFR BLD AUTO: 1 % (ref 0–1)
BILIRUB SERPL-MCNC: 0.5 MG/DL (ref 0.2–1)
CALCIUM SERPL-MCNC: 9.4 MG/DL (ref 8.5–10.1)
CHLORIDE SERPL-SCNC: 108 MMOL/L (ref 98–107)
CHOL/HDL RATIO: 2.6
CREAT SERPL-MCNC: 1.06 MG/DL (ref 0.7–1.3)
EOSINOPHIL # BLD AUTO: 0.23 X10^3/UL (ref 0–0.4)
EOSINOPHIL NFR BLD AUTO: 2 % (ref 1–7)
ERYTHROCYTE [DISTWIDTH] IN BLOOD BY AUTOMATED COUNT: 14.2 % (ref 9.4–14.8)
HDL CHOL %: 38 % (ref 26–37)
HDL CHOLESTEROL (DIRECT): 62 MG/DL (ref 40–60)
LDL CHOLESTEROL,CALCULATED: 75 MG/DL (ref 54–169)
LDLC/HDLC SERPL: 1.2 {RATIO} (ref 0.5–3)
LYMPHOCYTES # BLD AUTO: 3.81 X10^3/UL (ref 1–3.4)
LYMPHOCYTES NFR BLD AUTO: 39 % (ref 22–44)
MCH RBC QN AUTO: 32.9 PG (ref 27.5–34.5)
MCHC RBC AUTO-ENTMCNC: 33.5 G/DL (ref 33.2–36.2)
MCV RBC AUTO: 98.2 FL (ref 81–97)
MD: NO
MONOCYTES # BLD AUTO: 0.83 X10^3/UL (ref 0.2–0.8)
MONOCYTES NFR BLD AUTO: 9 % (ref 2–9)
NEUTROPHILS # BLD AUTO: 4.75 X10^3/UL (ref 1.8–6.8)
NEUTROPHILS NFR BLD AUTO: 49 % (ref 42–75)
PLATELET # BLD AUTO: 203 X10^3/UL (ref 130–400)
PMV BLD AUTO: 8.1 FL (ref 7.4–10.4)
PROT SERPL-MCNC: 7.1 G/DL (ref 6.4–8.2)
RBC # BLD AUTO: 4.56 X10^6/UL (ref 4.38–5.82)
TRIGL SERPL-MCNC: 134 MG/DL (ref 50–200)
VLDLC SERPL CALC-MCNC: 27 MG/DL (ref 0–25)

## 2018-08-27 PROCEDURE — 80053 COMPREHEN METABOLIC PANEL: CPT

## 2018-08-27 PROCEDURE — 36415 COLL VENOUS BLD VENIPUNCTURE: CPT

## 2018-08-27 PROCEDURE — 85025 COMPLETE CBC W/AUTO DIFF WBC: CPT

## 2018-08-27 PROCEDURE — 80061 LIPID PANEL: CPT

## 2018-09-05 ENCOUNTER — HOSPITAL ENCOUNTER (OUTPATIENT)
Dept: HOSPITAL 8 - LAB | Age: 83
Discharge: HOME | End: 2018-09-05
Attending: NURSE PRACTITIONER
Payer: MEDICARE

## 2018-09-05 DIAGNOSIS — Z87.891: ICD-10-CM

## 2018-09-05 DIAGNOSIS — E55.9: ICD-10-CM

## 2018-09-05 DIAGNOSIS — E78.00: ICD-10-CM

## 2018-09-05 DIAGNOSIS — I10: Primary | ICD-10-CM

## 2018-09-05 LAB
ALBUMIN SERPL-MCNC: 3.6 G/DL (ref 3.4–5)
ALP SERPL-CCNC: 51 U/L (ref 45–117)
ALT SERPL-CCNC: 38 U/L (ref 12–78)
ANION GAP SERPL CALC-SCNC: 5 MMOL/L (ref 5–15)
BASOPHILS # BLD AUTO: 0.06 X10^3/UL (ref 0–0.1)
BASOPHILS NFR BLD AUTO: 1 % (ref 0–1)
BILIRUB SERPL-MCNC: 0.6 MG/DL (ref 0.2–1)
CALCIUM SERPL-MCNC: 8.8 MG/DL (ref 8.5–10.1)
CHLORIDE SERPL-SCNC: 109 MMOL/L (ref 98–107)
CHOL/HDL RATIO: 2.5
CREAT SERPL-MCNC: 0.99 MG/DL (ref 0.7–1.3)
EOSINOPHIL # BLD AUTO: 0.18 X10^3/UL (ref 0–0.4)
EOSINOPHIL NFR BLD AUTO: 3 % (ref 1–7)
ERYTHROCYTE [DISTWIDTH] IN BLOOD BY AUTOMATED COUNT: 14.1 % (ref 9.4–14.8)
HDL CHOL %: 40 % (ref 26–37)
HDL CHOLESTEROL (DIRECT): 57 MG/DL (ref 40–60)
LDL CHOLESTEROL,CALCULATED: 57 MG/DL (ref 54–169)
LDLC/HDLC SERPL: 1 {RATIO} (ref 0.5–3)
LYMPHOCYTES # BLD AUTO: 2.2 X10^3/UL (ref 1–3.4)
LYMPHOCYTES NFR BLD AUTO: 30 % (ref 22–44)
MCH RBC QN AUTO: 32.4 PG (ref 27.5–34.5)
MCHC RBC AUTO-ENTMCNC: 32.9 G/DL (ref 33.2–36.2)
MCV RBC AUTO: 98.5 FL (ref 81–97)
MD: NO
MONOCYTES # BLD AUTO: 0.56 X10^3/UL (ref 0.2–0.8)
MONOCYTES NFR BLD AUTO: 8 % (ref 2–9)
NEUTROPHILS # BLD AUTO: 4.27 X10^3/UL (ref 1.8–6.8)
NEUTROPHILS NFR BLD AUTO: 59 % (ref 42–75)
PLATELET # BLD AUTO: 183 X10^3/UL (ref 130–400)
PMV BLD AUTO: 7.9 FL (ref 7.4–10.4)
PROT SERPL-MCNC: 6.8 G/DL (ref 6.4–8.2)
RBC # BLD AUTO: 4.3 X10^6/UL (ref 4.38–5.82)
TRIGL SERPL-MCNC: 145 MG/DL (ref 50–200)
VLDLC SERPL CALC-MCNC: 29 MG/DL (ref 0–25)

## 2018-09-05 PROCEDURE — 80061 LIPID PANEL: CPT

## 2018-09-05 PROCEDURE — 80053 COMPREHEN METABOLIC PANEL: CPT

## 2018-09-05 PROCEDURE — 36415 COLL VENOUS BLD VENIPUNCTURE: CPT

## 2018-09-05 PROCEDURE — 85025 COMPLETE CBC W/AUTO DIFF WBC: CPT

## 2018-09-05 PROCEDURE — 82306 VITAMIN D 25 HYDROXY: CPT

## 2018-10-10 ENCOUNTER — HOSPITAL ENCOUNTER (OUTPATIENT)
Dept: HOSPITAL 8 - LAB | Age: 83
Discharge: HOME | End: 2018-10-10
Attending: NURSE PRACTITIONER
Payer: MEDICARE

## 2018-10-10 DIAGNOSIS — I48.91: ICD-10-CM

## 2018-10-10 DIAGNOSIS — I10: Primary | ICD-10-CM

## 2018-10-10 DIAGNOSIS — E55.9: ICD-10-CM

## 2018-10-10 LAB
ALBUMIN SERPL-MCNC: 3.7 G/DL (ref 3.4–5)
ALP SERPL-CCNC: 57 U/L (ref 45–117)
ALT SERPL-CCNC: 36 U/L (ref 12–78)
ANION GAP SERPL CALC-SCNC: 8 MMOL/L (ref 5–15)
BASOPHILS # BLD AUTO: 0.03 X10^3/UL (ref 0–0.1)
BASOPHILS NFR BLD AUTO: 1 % (ref 0–1)
BILIRUB SERPL-MCNC: 0.8 MG/DL (ref 0.2–1)
CALCIUM SERPL-MCNC: 8.9 MG/DL (ref 8.5–10.1)
CHLORIDE SERPL-SCNC: 108 MMOL/L (ref 98–107)
CHOL/HDL RATIO: 2.1
CREAT SERPL-MCNC: 0.88 MG/DL (ref 0.7–1.3)
EOSINOPHIL # BLD AUTO: 0.22 X10^3/UL (ref 0–0.4)
EOSINOPHIL NFR BLD AUTO: 3 % (ref 1–7)
ERYTHROCYTE [DISTWIDTH] IN BLOOD BY AUTOMATED COUNT: 14.9 % (ref 9.4–14.8)
HDL CHOL %: 47 % (ref 26–37)
HDL CHOLESTEROL (DIRECT): 65 MG/DL (ref 40–60)
LDL CHOLESTEROL,CALCULATED: 50 MG/DL (ref 54–169)
LDLC/HDLC SERPL: 0.8 {RATIO} (ref 0.5–3)
LYMPHOCYTES # BLD AUTO: 2.05 X10^3/UL (ref 1–3.4)
LYMPHOCYTES NFR BLD AUTO: 31 % (ref 22–44)
MCH RBC QN AUTO: 32.7 PG (ref 27.5–34.5)
MCHC RBC AUTO-ENTMCNC: 33.2 G/DL (ref 33.2–36.2)
MCV RBC AUTO: 98.3 FL (ref 81–97)
MD: NO
MONOCYTES # BLD AUTO: 0.64 X10^3/UL (ref 0.2–0.8)
MONOCYTES NFR BLD AUTO: 10 % (ref 2–9)
NEUTROPHILS # BLD AUTO: 3.74 X10^3/UL (ref 1.8–6.8)
NEUTROPHILS NFR BLD AUTO: 56 % (ref 42–75)
PLATELET # BLD AUTO: 189 X10^3/UL (ref 130–400)
PMV BLD AUTO: 8 FL (ref 7.4–10.4)
PROT SERPL-MCNC: 7.2 G/DL (ref 6.4–8.2)
RBC # BLD AUTO: 4.36 X10^6/UL (ref 4.38–5.82)
TRIGL SERPL-MCNC: 113 MG/DL (ref 50–200)
VLDLC SERPL CALC-MCNC: 23 MG/DL (ref 0–25)

## 2018-10-10 PROCEDURE — 82306 VITAMIN D 25 HYDROXY: CPT

## 2018-10-10 PROCEDURE — 36415 COLL VENOUS BLD VENIPUNCTURE: CPT

## 2018-10-10 PROCEDURE — 80053 COMPREHEN METABOLIC PANEL: CPT

## 2018-10-10 PROCEDURE — 85025 COMPLETE CBC W/AUTO DIFF WBC: CPT

## 2018-10-10 PROCEDURE — 80061 LIPID PANEL: CPT

## 2019-01-23 ENCOUNTER — HOSPITAL ENCOUNTER (EMERGENCY)
Dept: HOSPITAL 8 - ED | Age: 84
Discharge: HOME | End: 2019-01-23
Payer: MEDICARE

## 2019-01-23 VITALS — DIASTOLIC BLOOD PRESSURE: 70 MMHG | SYSTOLIC BLOOD PRESSURE: 110 MMHG

## 2019-01-23 VITALS — HEIGHT: 71 IN | WEIGHT: 189.6 LBS | BODY MASS INDEX: 26.54 KG/M2

## 2019-01-23 DIAGNOSIS — N19: ICD-10-CM

## 2019-01-23 DIAGNOSIS — I50.33: ICD-10-CM

## 2019-01-23 DIAGNOSIS — I50.1: ICD-10-CM

## 2019-01-23 DIAGNOSIS — I11.0: Primary | ICD-10-CM

## 2019-01-23 DIAGNOSIS — I48.91: ICD-10-CM

## 2019-01-23 LAB
ALBUMIN SERPL-MCNC: 3.4 G/DL (ref 3.4–5)
ANION GAP SERPL CALC-SCNC: 8 MMOL/L (ref 5–15)
BASOPHILS # BLD AUTO: 0.03 X10^3/UL (ref 0–0.1)
BASOPHILS NFR BLD AUTO: 0 % (ref 0–1)
CALCIUM SERPL-MCNC: 8.7 MG/DL (ref 8.5–10.1)
CHLORIDE SERPL-SCNC: 102 MMOL/L (ref 98–107)
CREAT SERPL-MCNC: 0.67 MG/DL (ref 0.7–1.3)
EOSINOPHIL # BLD AUTO: 0.09 X10^3/UL (ref 0–0.4)
EOSINOPHIL NFR BLD AUTO: 1 % (ref 1–7)
ERYTHROCYTE [DISTWIDTH] IN BLOOD BY AUTOMATED COUNT: 14.6 % (ref 9.4–14.8)
LYMPHOCYTES # BLD AUTO: 1.62 X10^3/UL (ref 1–3.4)
LYMPHOCYTES NFR BLD AUTO: 20 % (ref 22–44)
MCH RBC QN AUTO: 32.4 PG (ref 27.5–34.5)
MCHC RBC AUTO-ENTMCNC: 33.9 G/DL (ref 33.2–36.2)
MCV RBC AUTO: 95.6 FL (ref 81–97)
MD: NO
MONOCYTES # BLD AUTO: 1.01 X10^3/UL (ref 0.2–0.8)
MONOCYTES NFR BLD AUTO: 13 % (ref 2–9)
NEUTROPHILS # BLD AUTO: 5.32 X10^3/UL (ref 1.8–6.8)
NEUTROPHILS NFR BLD AUTO: 66 % (ref 42–75)
PLATELET # BLD AUTO: 222 X10^3/UL (ref 130–400)
PMV BLD AUTO: 8.3 FL (ref 7.4–10.4)
RBC # BLD AUTO: 4.05 X10^6/UL (ref 4.38–5.82)
TROPONIN I SERPL-MCNC: 0.02 NG/ML (ref 0–0.04)

## 2019-01-23 PROCEDURE — 99284 EMERGENCY DEPT VISIT MOD MDM: CPT

## 2019-01-23 PROCEDURE — 84484 ASSAY OF TROPONIN QUANT: CPT

## 2019-01-23 PROCEDURE — 71045 X-RAY EXAM CHEST 1 VIEW: CPT

## 2019-01-23 PROCEDURE — 82040 ASSAY OF SERUM ALBUMIN: CPT

## 2019-01-23 PROCEDURE — 93005 ELECTROCARDIOGRAM TRACING: CPT

## 2019-01-23 PROCEDURE — 85025 COMPLETE CBC W/AUTO DIFF WBC: CPT

## 2019-01-23 PROCEDURE — 36415 COLL VENOUS BLD VENIPUNCTURE: CPT

## 2019-01-23 PROCEDURE — 80048 BASIC METABOLIC PNL TOTAL CA: CPT

## 2019-01-23 PROCEDURE — 83880 ASSAY OF NATRIURETIC PEPTIDE: CPT

## 2019-01-23 NOTE — NUR
BREAK RN: MED STUDENT AT BEDSIDE FOR EVAL AT THIS TIME. PT CURRENTLY RESTING ON 
GURNEY. NAD NOTED. SKIN PWD. RESP EVEN AND EQAUL. PT ABLE TO SPEAK IN FULL 
10-12 WORD SENTENCES W/O DIFFICULTY. PT AO X 4. WIFE AT BEDSIDE. PT ON CONT 
CARDIAC, BP AND O2 MONITORS. A-FIB IN THE 80'S NOTED ON MONITOR. CALL LIGHT 
IWTHIN REACH. WILL CONT TO MONITOR PT.

## 2019-01-23 NOTE — NUR
Patient/Caregiver given discharge instructions and they have confirmed that 
they understand the instructions.  Patient ambulatory using cane to wheelchair.

## 2019-01-29 ENCOUNTER — HOSPITAL ENCOUNTER (EMERGENCY)
Dept: HOSPITAL 8 - ED | Age: 84
Discharge: HOME | End: 2019-01-29
Payer: MEDICARE

## 2019-01-29 VITALS — BODY MASS INDEX: 28.4 KG/M2 | WEIGHT: 202.83 LBS | HEIGHT: 71 IN

## 2019-01-29 VITALS — SYSTOLIC BLOOD PRESSURE: 116 MMHG | DIASTOLIC BLOOD PRESSURE: 57 MMHG

## 2019-01-29 DIAGNOSIS — I11.0: Primary | ICD-10-CM

## 2019-01-29 DIAGNOSIS — I48.91: ICD-10-CM

## 2019-01-29 DIAGNOSIS — R33.8: ICD-10-CM

## 2019-01-29 DIAGNOSIS — G89.29: ICD-10-CM

## 2019-01-29 DIAGNOSIS — N40.1: ICD-10-CM

## 2019-01-29 DIAGNOSIS — I50.1: ICD-10-CM

## 2019-01-29 DIAGNOSIS — M54.9: ICD-10-CM

## 2019-01-29 LAB
ALBUMIN SERPL-MCNC: 3.5 G/DL (ref 3.4–5)
ALP SERPL-CCNC: 68 U/L (ref 45–117)
ALT SERPL-CCNC: 24 U/L (ref 12–78)
ANION GAP SERPL CALC-SCNC: 9 MMOL/L (ref 5–15)
BASOPHILS # BLD AUTO: 0.06 X10^3/UL (ref 0–0.1)
BASOPHILS NFR BLD AUTO: 1 % (ref 0–1)
BILIRUB SERPL-MCNC: 1.3 MG/DL (ref 0.2–1)
CALCIUM SERPL-MCNC: 8.9 MG/DL (ref 8.5–10.1)
CHLORIDE SERPL-SCNC: 94 MMOL/L (ref 98–107)
CREAT SERPL-MCNC: 0.71 MG/DL (ref 0.7–1.3)
CULTURE INDICATED?: NO
EOSINOPHIL # BLD AUTO: 0.06 X10^3/UL (ref 0–0.4)
EOSINOPHIL NFR BLD AUTO: 1 % (ref 1–7)
ERYTHROCYTE [DISTWIDTH] IN BLOOD BY AUTOMATED COUNT: 14.6 % (ref 9.4–14.8)
LYMPHOCYTES # BLD AUTO: 1.61 X10^3/UL (ref 1–3.4)
LYMPHOCYTES NFR BLD AUTO: 18 % (ref 22–44)
MCH RBC QN AUTO: 31.8 PG (ref 27.5–34.5)
MCHC RBC AUTO-ENTMCNC: 33 G/DL (ref 33.2–36.2)
MCV RBC AUTO: 96.4 FL (ref 81–97)
MD: NO
MICROSCOPIC: (no result)
MONOCYTES # BLD AUTO: 0.85 X10^3/UL (ref 0.2–0.8)
MONOCYTES NFR BLD AUTO: 10 % (ref 2–9)
NEUTROPHILS # BLD AUTO: 6.23 X10^3/UL (ref 1.8–6.8)
NEUTROPHILS NFR BLD AUTO: 71 % (ref 42–75)
PLATELET # BLD AUTO: 197 X10^3/UL (ref 130–400)
PMV BLD AUTO: 8.5 FL (ref 7.4–10.4)
PROT SERPL-MCNC: 6.8 G/DL (ref 6.4–8.2)
RBC # BLD AUTO: 4.1 X10^6/UL (ref 4.38–5.82)
TROPONIN I SERPL-MCNC: < 0.015 NG/ML (ref 0–0.04)

## 2019-01-29 PROCEDURE — 84484 ASSAY OF TROPONIN QUANT: CPT

## 2019-01-29 PROCEDURE — 80053 COMPREHEN METABOLIC PANEL: CPT

## 2019-01-29 PROCEDURE — 93005 ELECTROCARDIOGRAM TRACING: CPT

## 2019-01-29 PROCEDURE — 99284 EMERGENCY DEPT VISIT MOD MDM: CPT

## 2019-01-29 PROCEDURE — 71045 X-RAY EXAM CHEST 1 VIEW: CPT

## 2019-01-29 PROCEDURE — 83880 ASSAY OF NATRIURETIC PEPTIDE: CPT

## 2019-01-29 PROCEDURE — 36415 COLL VENOUS BLD VENIPUNCTURE: CPT

## 2019-01-29 PROCEDURE — 85025 COMPLETE CBC W/AUTO DIFF WBC: CPT

## 2019-01-29 PROCEDURE — 81003 URINALYSIS AUTO W/O SCOPE: CPT

## 2019-01-29 NOTE — NUR
PT SLIPPED WHILE WALKING TO RESTROOM USING DOORFRAME AS SUPPORT TO CONTROL 
DECENT TO GROUND. PT HAD SMALL SKIN TEAR ON KNEE WHICH HAS BEEN CLEANED & 
BANDAGED, MD AT BEDSIDE TO REASSESS PT. MD TO PRINT D/C INSTRUCTIONS & PT TO BE 
D/C'ED

## 2019-02-11 ENCOUNTER — HOSPITAL ENCOUNTER (OUTPATIENT)
Dept: HOSPITAL 8 - LAB | Age: 84
Discharge: HOME | End: 2019-02-11
Attending: INTERNAL MEDICINE
Payer: MEDICARE

## 2019-02-11 DIAGNOSIS — I48.2: ICD-10-CM

## 2019-02-11 DIAGNOSIS — I50.23: ICD-10-CM

## 2019-02-11 DIAGNOSIS — I11.0: Primary | ICD-10-CM

## 2019-02-11 PROCEDURE — 36415 COLL VENOUS BLD VENIPUNCTURE: CPT

## 2019-02-11 PROCEDURE — 83880 ASSAY OF NATRIURETIC PEPTIDE: CPT

## 2019-02-22 ENCOUNTER — HOSPITAL ENCOUNTER (OUTPATIENT)
Dept: HOSPITAL 8 - LAB | Age: 84
Discharge: HOME | End: 2019-02-22
Attending: INTERNAL MEDICINE
Payer: MEDICARE

## 2019-02-22 DIAGNOSIS — E78.00: ICD-10-CM

## 2019-02-22 DIAGNOSIS — I50.23: Primary | ICD-10-CM

## 2019-02-22 DIAGNOSIS — I48.0: ICD-10-CM

## 2019-02-22 DIAGNOSIS — I34.0: ICD-10-CM

## 2019-02-22 LAB
ANION GAP SERPL CALC-SCNC: 7 MMOL/L (ref 5–15)
CALCIUM SERPL-MCNC: 8.9 MG/DL (ref 8.5–10.1)
CHLORIDE SERPL-SCNC: 107 MMOL/L (ref 98–107)
CREAT SERPL-MCNC: 0.93 MG/DL (ref 0.7–1.3)

## 2019-02-22 PROCEDURE — 80048 BASIC METABOLIC PNL TOTAL CA: CPT

## 2019-02-22 PROCEDURE — 36415 COLL VENOUS BLD VENIPUNCTURE: CPT

## 2019-04-15 ENCOUNTER — HOSPITAL ENCOUNTER (OUTPATIENT)
Dept: HOSPITAL 8 - CFH | Age: 84
Discharge: HOME | End: 2019-04-15
Attending: INTERNAL MEDICINE
Payer: MEDICARE

## 2019-04-15 DIAGNOSIS — Z79.01: ICD-10-CM

## 2019-04-15 DIAGNOSIS — E78.5: ICD-10-CM

## 2019-04-15 DIAGNOSIS — I48.91: ICD-10-CM

## 2019-04-15 DIAGNOSIS — I08.3: Primary | ICD-10-CM

## 2019-04-15 PROCEDURE — 93306 TTE W/DOPPLER COMPLETE: CPT

## 2019-04-18 ENCOUNTER — HOSPITAL ENCOUNTER (OUTPATIENT)
Dept: HOSPITAL 8 - LAB | Age: 84
Discharge: HOME | End: 2019-04-18
Attending: INTERNAL MEDICINE
Payer: MEDICARE

## 2019-04-18 DIAGNOSIS — I71.4: ICD-10-CM

## 2019-04-18 DIAGNOSIS — I50.23: ICD-10-CM

## 2019-04-18 DIAGNOSIS — R53.83: ICD-10-CM

## 2019-04-18 DIAGNOSIS — I34.0: ICD-10-CM

## 2019-04-18 DIAGNOSIS — I11.0: Primary | ICD-10-CM

## 2019-04-18 DIAGNOSIS — I48.0: ICD-10-CM

## 2019-04-18 LAB
ANION GAP SERPL CALC-SCNC: 4 MMOL/L (ref 5–15)
CALCIUM SERPL-MCNC: 9.2 MG/DL (ref 8.5–10.1)
CHLORIDE SERPL-SCNC: 112 MMOL/L (ref 98–107)
CREAT SERPL-MCNC: 0.82 MG/DL (ref 0.7–1.3)

## 2019-04-18 PROCEDURE — 36415 COLL VENOUS BLD VENIPUNCTURE: CPT

## 2019-04-18 PROCEDURE — 80048 BASIC METABOLIC PNL TOTAL CA: CPT

## 2019-04-25 NOTE — PROGRESS NOTES
REFERRING PHYSICIAN: Juan F Reyes MD.     CONSULTING PHYSICIAN: Romy Law M.D. FACS.    CHIEF COMPLAINT: Fatigue.    HISTORY OF PRESENT ILLNESS: The patient is a 84 y.o. male with history of atrial fibrillation, acute on chronic systolic heart failure, cardiomyopathy, mitral regurgitation, hypertension, dyslipidemia,  and history of tobacco abuse. He states he has been having increasing fatigue for the last few months. He was recently in the hospital with heart failure exacerbation, given diuretics and improved. He has an exercise capacity of 100 yards with cane or walker or 25 minutes on stationary bike. He also complains of feeling light headed, he has to stop and rest and it gets better.  He denies shortness of breath, chest pain, dizziness, syncope, lower extremity edema, orthopnea or PND.    PAST MEDICAL HISTORY:   Active Ambulatory Problems     Diagnosis Date Noted   • HTN (hypertension) 08/05/2011   • Other chest pain - atypical 08/05/2011   • Dyslipidemia 08/05/2011   • Abdominal aortic ectasia (HCC) 04/08/2015   • Insomnia 07/07/2015   • Daytime hypersomnolence 07/07/2015   • Macrocytosis 07/07/2015   • BPH (benign prostatic hyperplasia) 12/14/2015   • Nasal congestion 06/14/2016   • Discoloration of tongue 06/14/2016   • Fatigue 12/15/2016   • Chronic low back pain 02/27/2017   • Glaucoma 02/27/2017   • Bilateral inguinal hernia 08/15/2017   • A-fib (HCC) 08/28/2017   • Vitamin D deficiency 08/28/2017     Resolved Ambulatory Problems     Diagnosis Date Noted   • Descending aortic aneurysm (HCC) 07/07/2015   • Absolute anemia 07/07/2015   • Malaise and fatigue 12/14/2015     Past Medical History:   Diagnosis Date   • Arthritis    • Bowel habit changes    • BPH (benign prostatic hyperplasia)    • CATARACT    • Glaucoma    • Heart murmur    • Hyperlipidemia    • Hypertension    • Pain        PAST SURGICAL HISTORY:   Past Surgical History:   Procedure Laterality Date   • TRIGGER FINGER RELEASE   10/8/2010    Performed by VIV COHEN at SURGERY SAME DAY [facility] ORS   • CATARACT PHACO WITH IOL  2008    BILATERAL   • HERNIA REPAIR     • TONSILLECTOMY      CHILD        ALLERGIES:   Allergies   Allergen Reactions   • Nkda [No Known Drug Allergy]         CURRENT MEDICATIONS:   Current Outpatient Prescriptions:   •  triamcinolone acetonide (KENALOG) 0.025 % Cream, Apply  to affected area(s) 2 times a day., Disp: , Rfl:   •  PARoxetine (PAXIL) 30 MG Tab, Take 30 mg by mouth every day., Disp: , Rfl:   •  atorvastatin (LIPITOR) 40 MG Tab, , Disp: , Rfl:   •  spironolactone (ALDACTONE) 25 MG Tab, Take 25 mg by mouth every day., Disp: , Rfl:   •  furosemide (LASIX) 40 MG Tab, Take 40 mg by mouth every day., Disp: , Rfl:   •  potassium chloride SA (KDUR) 20 MEQ Tab CR, Take 20 mEq by mouth 2 times a day., Disp: , Rfl:   •  trazodone (DESYREL) 50 MG Tab, Take 1-2 Tabs by mouth at bedtime as needed for Sleep., Disp: 180 Tab, Rfl: 1  •  rivaroxaban (XARELTO) 20 MG Tab tablet, Take 20 mg by mouth with dinner., Disp: , Rfl:   •  Cholecalciferol (EQL VITAMIN D3) 2000 UNIT Cap, Take 1 Cap by mouth every day., Disp: 30 Cap, Rfl:   •  Omega-3 Fatty Acids (FISH OIL) 1200 MG Cap, Take  by mouth every day., Disp: , Rfl:   •  finasteride (PROSCAR) 5 MG TABS, Take 5 mg by mouth every day., Disp: , Rfl:   •  tamsulosin (FLOMAX) 0.4 MG capsule, Take 0.4 mg by mouth ONE-HALF HOUR AFTER BREAKFAST. 2 tablets, Disp: , Rfl:   •  lisinopril (PRINIVIL) 20 MG TABS, Take 30 mg by mouth every day., Disp: , Rfl:   •  dorzolamide-timolol (COSOPT) 2-0.5 % SOLN, Place 1 Drop in left eye 2 times a day., Disp: , Rfl:   •  Multiple Vitamins-Minerals (CENTRUM SILVER PO), Take  by mouth., Disp: , Rfl:   •  Psyllium (METAMUCIL PO), Take  by mouth every day., Disp: , Rfl:   •  hydrocodone-acetaminophen (NORCO) 7.5-325 MG per tablet, Take 1 Tab by mouth every four hours as needed (Pain). (Patient not taking: Reported on 5/2/2019), Disp: 20 Tab, Rfl:  0  •  fluticasone (FLONASE) 50 MCG/ACT nasal spray, Spray 1 Spray in nose every day., Disp: 16 g, Rfl: 3  •  simvastatin (ZOCOR) 40 MG TABS, Take 40 mg by mouth every evening., Disp: , Rfl:   •  Ascorbic Acid (VITAMIN C PO), Take 500 mg by mouth., Disp: , Rfl:   •  ASPIRIN PO, Take 81 mg by mouth., Disp: , Rfl:   •  GLUCOSAMINE PO, Take 1,500 mg by mouth., Disp: , Rfl:   •  Polyethylene Glycol 3350 (MIRALAX PO), Take  by mouth as needed., Disp: , Rfl:     FAMILY HISTORY:   Family History   Problem Relation Age of Onset   • Kidney Disease Mother    • Heart Disease Father         SOCIAL HISTORY:   Social History     Social History   • Marital status:      Spouse name: N/A   • Number of children: N/A   • Years of education: N/A     Occupational History   • Not on file.     Social History Main Topics   • Smoking status: Former Smoker     Years: 3.00     Types: Pipe, Cigars     Quit date: 5/20/1972   • Smokeless tobacco: Never Used   • Alcohol use 1.2 oz/week     1 Cans of beer, 1 Shots of liquor per week      Comment: 1 x day   • Drug use: No   • Sexual activity: No     Other Topics Concern   • Not on file     Social History Narrative    Lives with wife, 1 story    Drives       REVIEW OF SYSTEMS:  Review of Systems   Constitutional: Positive for malaise/fatigue.   HENT: Positive for hearing loss.    Eyes: Positive for blurred vision.   Respiratory: Positive for shortness of breath.    Cardiovascular: Negative.    Gastrointestinal: Negative.    Genitourinary: Negative.    Musculoskeletal: Positive for back pain.   Skin: Negative.    Neurological: Positive for dizziness.   Psychiatric/Behavioral: Negative.      All other systems were reviewed with the patient and are negative except what is mentioned in the aforementioned HPI, PMH and PSH.    PHYSICAL EXAMINATION:    Physical Exam  CONSTITUTIONAL:  /64 (BP Location: Right arm, Patient Position: Sitting, BP Cuff Size: Adult)   Pulse 81   Temp 36.6 °C (97.9  "°F) (Temporal)   Ht 1.803 m (5' 11\")   Wt 82.1 kg (181 lb)   SpO2 96% .    General appearance: Frail, no apparent distress, normal development.  HEENT:  Anicteric sclerae, moist conjunctivae, moist mucous membranes, good dentition.   NECK:  Supple without jugular venous distention, without lymphadenopathy.  SKIN:   Loose skin turgor, no stasis dermatitis or ulcers noted.  RESPIRATORY:  Clear to auscultation bilaterally, respirations are regular, symmetrical and unlabored.   CARDIAC:  Regular rate and rhythm, 2/6 systolic murmur. No carotid bruits. Peripheral pulses palpable.   GASTROINTESTINAL:  Soft, non-distended, non-tender with bowel sounds present, no hepatosplenomegaly.  EXTREMITIES:  No clubbing, cyanosis, or changes consistent with peripheral vascular disease. 1+ bilateral edema of lower extremity edema.  NEUROLOGIC/ PSYCHIATRIC: Alert and oriented times three. Mood and affect normal.  MUSCULOSKELETAL:  Unsteady gait and station.    LABS REVIEWED:  Lab Results   Component Value Date/Time    SODIUM 140 02/12/2018 06:23 AM    POTASSIUM 4.5 02/12/2018 06:23 AM    CHLORIDE 105 02/12/2018 06:23 AM    CO2 28 02/12/2018 06:23 AM    GLUCOSE 100 (H) 02/12/2018 06:23 AM    BUN 14 02/12/2018 06:23 AM    CREATININE 0.94 02/12/2018 06:23 AM    CREATININE 0.9 06/01/2005 08:36 AM      No results found for: PROTHROMBTM, INR   Lab Results   Component Value Date/Time    WBC 6.7 02/12/2018 06:23 AM    RBC 4.49 (L) 02/12/2018 06:23 AM    HEMOGLOBIN 14.3 02/12/2018 06:23 AM    HEMATOCRIT 44.8 02/12/2018 06:23 AM    MCV 99.8 (H) 02/12/2018 06:23 AM    MCH 31.8 02/12/2018 06:23 AM    MCHC 31.9 (L) 02/12/2018 06:23 AM    MPV 10.3 02/12/2018 06:23 AM    NEUTSPOLYS 41.60 (L) 02/12/2018 06:23 AM    LYMPHOCYTES 43.70 (H) 02/12/2018 06:23 AM    MONOCYTES 9.60 02/12/2018 06:23 AM    EOSINOPHILS 3.60 02/12/2018 06:23 AM    BASOPHILS 1.20 02/12/2018 06:23 AM        IMAGING REVIEWED AND INTERPRETED:    ECHOCARDIOGRAM: [facility] " 2/6/2019  Moderate to severe mitral regurgitation, 2 central regurgitant jets visualized.  Mild tricuspid regurgitation  Mild aortic regurgitation  Ejection fraction 25 to 30%.    ANGIOGRAM: [facility] 2/6/2019  Severely depressed left ventricular systolic function  50% mid LAD stenosis  30 to 40% stenosis of a moderate sized OM.  30 to 40% stenosis at the origin of the PDA.    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      PLAN:  I do not recommend surgical mitral valve repair or replacement due to excessive operative risk.  I estimate this to be greater than 10%. The STS mortality risk score is 5% and the morbidity and mortality risk score is 20% mitral valve repair. The STS mortality risk score is 6% and the morbidity and mortality risk score is 22% mitral valve replacement.The scores were discussed with patient.    He may be a candidate for transcatheter mitral valve repair (MitraClip) and I would recommend proceeding with work-up.  Even a MitraClip procedure would be risky considering his low left ventricular ejection fraction.  However, the echo and cardiac cath were done when the patient was an active heart failure in the hospital and his LV function may have improved since then.  The procedure, its risks, benefits, potential complications and alternative treatments were discussed with the patient in detail.  I will have the patient consult with Dr. Wagner and possibly repeat the transesophageal echocardiogram prior to making a final recommendation.    Findings and recommendations have been discussed with the patient’s cardiologist Juan F Reyes MD.  Thank you for this very challenging consultation and participation in the patient’s care.  I will keep you apprised of all future  developments.    Sincerely,     Romy Law M.D. JULIANNA.      IRomy M.D. FACS performed a substantial portion of the EM visit face-to-face with the same patient on the same date of service with the APRN, Paula Hernandez. I was personally involved in reviewing and interpreting the films and conducted elements of the history and physical exam. I performed all of the medical decision making for the patient.

## 2019-05-02 ENCOUNTER — OFFICE VISIT (OUTPATIENT)
Dept: SURGERY | Facility: MEDICAL CENTER | Age: 84
End: 2019-05-02
Payer: MEDICARE

## 2019-05-02 VITALS
BODY MASS INDEX: 25.34 KG/M2 | HEIGHT: 71 IN | DIASTOLIC BLOOD PRESSURE: 64 MMHG | TEMPERATURE: 97.9 F | HEART RATE: 81 BPM | OXYGEN SATURATION: 96 % | SYSTOLIC BLOOD PRESSURE: 108 MMHG | WEIGHT: 181 LBS

## 2019-05-02 DIAGNOSIS — I10 ESSENTIAL (PRIMARY) HYPERTENSION: Primary | ICD-10-CM

## 2019-05-02 PROCEDURE — 99215 OFFICE O/P EST HI 40 MIN: CPT | Performed by: THORACIC SURGERY (CARDIOTHORACIC VASCULAR SURGERY)

## 2019-05-02 RX ORDER — FUROSEMIDE 40 MG/1
20 TABLET ORAL DAILY
COMMUNITY
End: 2023-01-24 | Stop reason: SDUPTHER

## 2019-05-02 RX ORDER — TRIAMCINOLONE ACETONIDE 0.25 MG/G
CREAM TOPICAL 2 TIMES DAILY
COMMUNITY
End: 2023-02-23

## 2019-05-02 RX ORDER — PAROXETINE 30 MG/1
30 TABLET, FILM COATED ORAL DAILY
COMMUNITY
End: 2022-11-10

## 2019-05-02 RX ORDER — ATORVASTATIN CALCIUM 40 MG/1
TABLET, FILM COATED ORAL
COMMUNITY
Start: 2019-04-10

## 2019-05-02 RX ORDER — SPIRONOLACTONE 25 MG/1
25 TABLET ORAL
COMMUNITY
End: 2023-02-23

## 2019-05-02 RX ORDER — POTASSIUM CHLORIDE 20 MEQ/1
10 TABLET, EXTENDED RELEASE ORAL DAILY
COMMUNITY
End: 2024-01-23

## 2019-05-02 ASSESSMENT — ENCOUNTER SYMPTOMS
PSYCHIATRIC NEGATIVE: 1
BACK PAIN: 1
SHORTNESS OF BREATH: 1
CARDIOVASCULAR NEGATIVE: 1
DIZZINESS: 1
BLURRED VISION: 1
GASTROINTESTINAL NEGATIVE: 1

## 2019-05-16 ENCOUNTER — TELEPHONE (OUTPATIENT)
Dept: CARDIOLOGY | Facility: MEDICAL CENTER | Age: 84
End: 2019-05-16

## 2019-05-16 ENCOUNTER — OFFICE VISIT (OUTPATIENT)
Dept: CARDIOLOGY | Facility: MEDICAL CENTER | Age: 84
End: 2019-05-16
Payer: MEDICARE

## 2019-05-16 VITALS
BODY MASS INDEX: 25.14 KG/M2 | WEIGHT: 179.56 LBS | HEIGHT: 71 IN | HEART RATE: 80 BPM | OXYGEN SATURATION: 96 % | SYSTOLIC BLOOD PRESSURE: 118 MMHG | DIASTOLIC BLOOD PRESSURE: 78 MMHG

## 2019-05-16 DIAGNOSIS — Z79.01 CHRONIC ANTICOAGULATION: ICD-10-CM

## 2019-05-16 DIAGNOSIS — I50.22 CHRONIC SYSTOLIC CONGESTIVE HEART FAILURE (HCC): Primary | ICD-10-CM

## 2019-05-16 DIAGNOSIS — G47.33 OSA (OBSTRUCTIVE SLEEP APNEA): ICD-10-CM

## 2019-05-16 DIAGNOSIS — I42.8 NICM (NONISCHEMIC CARDIOMYOPATHY) (HCC): ICD-10-CM

## 2019-05-16 DIAGNOSIS — I48.19 PERSISTENT ATRIAL FIBRILLATION (HCC): ICD-10-CM

## 2019-05-16 DIAGNOSIS — I34.0 SEVERE MITRAL REGURGITATION: ICD-10-CM

## 2019-05-16 LAB — EKG IMPRESSION: NORMAL

## 2019-05-16 PROCEDURE — 93000 ELECTROCARDIOGRAM COMPLETE: CPT | Performed by: INTERNAL MEDICINE

## 2019-05-16 PROCEDURE — 99215 OFFICE O/P EST HI 40 MIN: CPT | Performed by: INTERNAL MEDICINE

## 2019-05-16 RX ORDER — ZOSTER VACCINE RECOMBINANT, ADJUVANTED 50 MCG/0.5
KIT INTRAMUSCULAR
COMMUNITY
Start: 2019-03-14 | End: 2019-05-16

## 2019-05-16 RX ORDER — AMOXICILLIN 500 MG/1
CAPSULE ORAL
COMMUNITY
Start: 2019-04-10 | End: 2019-05-16

## 2019-05-16 RX ORDER — LISINOPRIL 5 MG/1
5 TABLET ORAL 2 TIMES DAILY
Qty: 60 TAB | Refills: 11 | Status: SHIPPED | OUTPATIENT
Start: 2019-05-16 | End: 2021-09-15

## 2019-05-16 RX ORDER — TIMOLOL MALEATE 5 MG/ML
1 SOLUTION OPHTHALMIC DAILY
COMMUNITY
End: 2024-02-29

## 2019-05-16 ASSESSMENT — ENCOUNTER SYMPTOMS
NERVOUS/ANXIOUS: 1
BRUISES/BLEEDS EASILY: 1
CONSTIPATION: 1
SHORTNESS OF BREATH: 1
BACK PAIN: 1
ORTHOPNEA: 1
WEAKNESS: 1
BLURRED VISION: 1
TINGLING: 1

## 2019-05-16 NOTE — PROGRESS NOTES
Chief Complaint   Patient presents with   • Hypertension       Subjective:   Dannie Thurston is an 85 -year-old man referred for evaluation for surgical versus transcatheter mitral valve repair in the setting of severe, functional mitral regurgitation with a nonischemic cardiomyopathy and a left ventricular ejection fraction of 25%.    In reviewing his records not all of which I have available to me at this time it appears as though he was diagnosed with systolic congestive heart failure in January when he had presented with a heart failure exacerbation.  He was placed on medical therapy, and a rhythm control strategy was attempted with cardioversion for his atrial fibrillation, which was unsuccessful.  Despite what was felt to be maximally tolerated heart failure therapy, he had persistent severe mitral regurgitation based on his echocardiogram as below, and he was referred to our health system for surgical versus transcatheter mitral valve repair.  He was seen by the surgeons who of course felt that he was too high risk for surgery given primarily his age, but other comorbidities as well in addition to his systolic CHF.  Related to borderline blood pressures, his lisinopril previously at 10 mg p.o. daily was stopped by his primary cardiologist probably in September according to him and his records.    In speaking with the patient today, he tells me that he has mild exertional dyspnea, and can ride a stationary bike for 30 minutes at a time without significant limitation.  He does have quite a bit of fatigue and energy loss that he tells me has been present for the last 2 years.  He requires frequently daytime naps, and has not been evaluated for sleep apnea with a sleep study.    He has mild, chronic lower extremity edema.  He also brings in a log of his blood pressures documenting systolic blood pressures ranging primarily in the 110s, but between 80 and 120 mmHg.    He comes in with his wife, both are wonderfully  pleasant.    He denies any bleeding side effects with his oral anticoagulant, Xarelto.    Past Medical History:   Diagnosis Date   • Arthritis     low back   • Bowel habit changes    • BPH (benign prostatic hyperplasia)    • CATARACT    • Chronic anticoagulation 5/16/2019   • Chronic systolic congestive heart failure (HCC) 5/16/2019   • Glaucoma    • Heart murmur    • Hyperlipidemia    • Hypertension    • NICM (nonischemic cardiomyopathy) ([facility]) 5/16/2019   • Pain     Lower Back   • Persistent atrial fibrillation (HCC) 8/28/2017   • Severe mitral regurgitation 5/16/2019     Past Surgical History:   Procedure Laterality Date   • TRIGGER FINGER RELEASE  10/8/2010    Performed by VIV COHEN at SURGERY SAME DAY [facility] ORS   • CATARACT PHACO WITH IOL  2008    BILATERAL   • HERNIA REPAIR     • TONSILLECTOMY      CHILD     Family History   Problem Relation Age of Onset   • Kidney Disease Mother    • Heart Disease Father      Social History     Social History   • Marital status:      Spouse name: N/A   • Number of children: N/A   • Years of education: N/A     Occupational History   • Not on file.     Social History Main Topics   • Smoking status: Former Smoker     Years: 3.00     Types: Pipe, Cigars     Quit date: 5/20/1972   • Smokeless tobacco: Never Used   • Alcohol use 1.2 oz/week     1 Cans of beer, 1 Shots of liquor per week      Comment: 1 x day   • Drug use: No   • Sexual activity: No     Other Topics Concern   • Not on file     Social History Narrative    Lives with wife, 1 story    Drives     Allergies   Allergen Reactions   • Nkda [No Known Drug Allergy]      Outpatient Encounter Prescriptions as of 5/16/2019   Medication Sig Dispense Refill   • timolol gel-forming (TIMOPTIC-XR) 0.5 % GEL FORMING SOLUTION Place 1 Drop in both eyes every day.     • lisinopril (PRINIVIL) 5 MG Tab Take 1 Tab by mouth 2 times a day. 60 Tab 11   • triamcinolone acetonide (KENALOG) 0.025 % Cream Apply  to affected area(s)  2 times a day.     • PARoxetine (PAXIL) 30 MG Tab Take 30 mg by mouth every day.     • atorvastatin (LIPITOR) 40 MG Tab      • spironolactone (ALDACTONE) 25 MG Tab Take 25 mg by mouth every day.     • furosemide (LASIX) 40 MG Tab Take 40 mg by mouth every day.     • potassium chloride SA (KDUR) 20 MEQ Tab CR Take 20 mEq by mouth 2 times a day.     • trazodone (DESYREL) 50 MG Tab Take 1-2 Tabs by mouth at bedtime as needed for Sleep. 180 Tab 1   • rivaroxaban (XARELTO) 20 MG Tab tablet Take 20 mg by mouth with dinner.     • Cholecalciferol (EQL VITAMIN D3) 2000 UNIT Cap Take 1 Cap by mouth every day. 30 Cap    • Omega-3 Fatty Acids (FISH OIL) 1200 MG Cap Take  by mouth every day.     • finasteride (PROSCAR) 5 MG TABS Take 5 mg by mouth every day.     • tamsulosin (FLOMAX) 0.4 MG capsule Take 0.4 mg by mouth ONE-HALF HOUR AFTER BREAKFAST. 2 tablets     • dorzolamide-timolol (COSOPT) 2-0.5 % SOLN Place 1 Drop in left eye 2 times a day.     • Ascorbic Acid (VITAMIN C PO) Take 500 mg by mouth.     • Multiple Vitamins-Minerals (CENTRUM SILVER PO) Take  by mouth.     • Psyllium (METAMUCIL PO) Take  by mouth every day.     • Polyethylene Glycol 3350 (MIRALAX PO) Take  by mouth as needed.     • [DISCONTINUED] amoxicillin (AMOXIL) 500 MG Cap      • [DISCONTINUED] SHINGRIX 50 MCG/0.5ML Recon Susp      • [DISCONTINUED] hydrocodone-acetaminophen (NORCO) 7.5-325 MG per tablet Take 1 Tab by mouth every four hours as needed (Pain). (Patient not taking: Reported on 5/16/2019) 20 Tab 0   • [DISCONTINUED] fluticasone (FLONASE) 50 MCG/ACT nasal spray Spray 1 Spray in nose every day. (Patient not taking: Reported on 5/16/2019) 16 g 3   • [DISCONTINUED] lisinopril (PRINIVIL) 20 MG TABS Take 30 mg by mouth every day.     • simvastatin (ZOCOR) 40 MG TABS Take 40 mg by mouth every evening.     • ASPIRIN PO Take 81 mg by mouth.     • [DISCONTINUED] GLUCOSAMINE PO Take 1,500 mg by mouth.       No facility-administered encounter medications on  "file as of 5/16/2019.      Review of Systems   Constitutional: Positive for malaise/fatigue.   HENT: Positive for congestion.    Eyes: Positive for blurred vision.   Respiratory: Positive for shortness of breath.    Cardiovascular: Positive for chest pain, orthopnea and leg swelling.   Gastrointestinal: Positive for constipation.   Musculoskeletal: Positive for back pain.   Neurological: Positive for tingling and weakness.   Endo/Heme/Allergies: Bruises/bleeds easily.   Psychiatric/Behavioral: The patient is nervous/anxious.    All other systems reviewed and are negative.       Objective:   /78 (BP Location: Right arm, Patient Position: Sitting, BP Cuff Size: Adult)   Pulse 80   Ht 1.803 m (5' 11\")   Wt 81.4 kg (179 lb 9 oz)   SpO2 96%   BMI 25.04 kg/m²     Physical Exam   Constitutional: He is oriented to person, place, and time. No distress.   Pleasant, frail-appearing, elderly man in no distress   Eyes: Pupils are equal, round, and reactive to light. EOM are normal. No scleral icterus.   Neck: No JVD present.   Jugular venous pulsations measured at 8-9 cm H2O   Cardiovascular: Normal rate.  An irregularly irregular rhythm present. Exam reveals no gallop and no friction rub.    Murmur heard.   Systolic (Grade 3 blowing systolic murmur best appreciated in his axilla) murmur is present with a grade of 3/6   Pulmonary/Chest: Effort normal and breath sounds normal. No respiratory distress. He has no wheezes. He has no rales.   Abdominal: Soft. Bowel sounds are normal. He exhibits no distension.   Musculoskeletal: He exhibits edema (1+ bilateral lower extremity edema).   Neurological: He is alert and oriented to person, place, and time.   Skin: Skin is warm and dry. No rash noted. He is not diaphoretic. No erythema. No pallor.   Nursing note and vitals reviewed.    Lab Results   Component Value Date/Time    WBC 6.7 02/12/2018 06:23 AM    RBC 4.49 (L) 02/12/2018 06:23 AM    HEMOGLOBIN 14.3 02/12/2018 06:23 AM "    HEMATOCRIT 44.8 02/12/2018 06:23 AM    MCV 99.8 (H) 02/12/2018 06:23 AM    MCH 31.8 02/12/2018 06:23 AM    MCHC 31.9 (L) 02/12/2018 06:23 AM    MPV 10.3 02/12/2018 06:23 AM        Lab Results   Component Value Date/Time    SODIUM 140 02/12/2018 06:23 AM    POTASSIUM 4.5 02/12/2018 06:23 AM    CHLORIDE 105 02/12/2018 06:23 AM    CO2 28 02/12/2018 06:23 AM    GLUCOSE 100 (H) 02/12/2018 06:23 AM    BUN 14 02/12/2018 06:23 AM    CREATININE 0.94 02/12/2018 06:23 AM    CREATININE 0.9 06/01/2005 08:36 AM        Lab Results   Component Value Date/Time    ASTSGOT 23 02/12/2018 06:23 AM    ALTSGPT 30 02/12/2018 06:23 AM        Lab Results   Component Value Date/Time    CHOLSTRLTOT 147 12/20/2017 06:11 AM    LDL 67 12/20/2017 06:11 AM    HDL 65 12/20/2017 06:11 AM    TRIGLYCERIDE 74 12/20/2017 06:11 AM         No results found for this or any previous visit.    ECHOCARDIOGRAM: [facility] 2/6/2019  Moderate to severe mitral regurgitation, 2 central regurgitant jets visualized.  Mild tricuspid regurgitation  Mild aortic regurgitation  Ejection fraction 25 to 30%.     ANGIOGRAM: [facility] 2/6/2019  Severely depressed left ventricular systolic function  50% mid LAD stenosis  30 to 40% stenosis of a moderate sized OM.  30 to 40% stenosis at the origin of the PDA.    EKG, 5/16/2019, tracings and report reviewed, my interpretation: Demonstrates atrial fibrillation with a ventricular rate of 79 bpm and a left bundle branch block with a QRS duration of 162 ms    Assessment:     1. Chronic systolic congestive heart failure (HCC)  lisinopril (PRINIVIL) 5 MG Tab    Basic Metabolic Panel    REFERRAL TO CARDIAC ELECTROPHYSIOLOGY    EKG    EC-ECHOCARDIOGRAM COMPLETE W/O CONT   2. Persistent atrial fibrillation (HCC)     3. Chronic anticoagulation     4. LUKAS (obstructive sleep apnea)  REFERRAL TO SLEEP STUDIES   5. Severe mitral regurgitation  EC-DEBORAH W/O CONT    REFERRAL TO CARDIOLOGY   6. NICM (nonischemic cardiomyopathy) ([facility])  EC-ECHOCARDIOGRAM  COMPLETE W/O CONT       Medical Decision Making:  Today's Assessment / Status / Plan:     He is mildly hypervolemic with his systolic congestive heart failure.  His severe mitral regurgitation is symptomatic though the extent of it I cannot be entirely sure.  He mostly tells me today about daytime fatigue that I rather suspect is related to sleep apnea.  I reviewed his blood pressures and I do suspect that he will tolerate lisinopril at 5 mg p.o. twice daily.  I wonder if his cuff may be somewhat inaccurate and I asked him to bring his blood pressure cuff to be compared with ours at his next follow-up.  I have organized a transesophageal echocardiogram, and he will see the valve program after that study has been done.  I also ordered a repeat transthoracic echocardiogram to be done in 3 months with electrophysiology to see him either here or at [place] to discuss the possibility of a biventricular defibrillator, which I do think may substantially help him given his wide left bundle branch block.    Otherwise, his atrial fibrillation is rate controlled off of jaylyn blockers, and I do not suspect that he will benefit much from a beta-blocker in terms of his systolic congestive heart failure especially with his mild hypervolemia.  He is tolerating oral anticoagulants well, and I recommended that he continue Xarelto.    Nnamdi Youssef MD  Cardiologist, [facility] [place]

## 2019-05-16 NOTE — TELEPHONE ENCOUNTER
Patient is scheduled on 5-23-19 for a DEBORAH w/ Dr. Paige. Patient was told to hold lasix am day of procedure and to check in at 11:00 for a 1:00 procedure. H&P was done on 5-16-19 by Dr. BRAYDEN Youssef.

## 2019-05-16 NOTE — LETTER
[facility]   [address] [place] [address]  [place] [address]  Phone: [phone]  Fax: [phone]              Dannie Thurston  5/5/1934    Encounter Date: 5/16/2019    Nnamdi Youssef M.D.          PROGRESS NOTE:  Chief Complaint   Patient presents with   • Hypertension       Subjective:   Dannie Thurston is an 85 -year-old man referred for evaluation for surgical versus transcatheter mitral valve repair in the setting of severe, functional mitral regurgitation with a nonischemic cardiomyopathy and a left ventricular ejection fraction of 25%.    In reviewing his records not all of which I have available to me at this time it appears as though he was diagnosed with systolic congestive heart failure in January when he had presented with a heart failure exacerbation.  He was placed on medical therapy, and a rhythm control strategy was attempted with cardioversion for his atrial fibrillation, which was unsuccessful.  Despite what was felt to be maximally tolerated heart failure therapy, he had persistent severe mitral regurgitation based on his echocardiogram as below, and he was referred to our health system for surgical versus transcatheter mitral valve repair.  He was seen by the surgeons who of course felt that he was too high risk for surgery given primarily his age, but other comorbidities as well in addition to his systolic CHF.  Related to borderline blood pressures, his lisinopril previously at 10 mg p.o. daily was stopped by his primary cardiologist probably in September according to him and his records.    In speaking with the patient today, he tells me that he has mild exertional dyspnea, and can ride a stationary bike for 30 minutes at a time without significant limitation.  He does have quite a bit of fatigue and energy loss that he tells me has been present for the last 2 years.  He requires frequently daytime naps, and has not been evaluated for sleep apnea  with a sleep study.    He has mild, chronic lower extremity edema.  He also brings in a log of his blood pressures documenting systolic blood pressures ranging primarily in the 110s, but between 80 and 120 mmHg.    He comes in with his wife, both are wonderfully pleasant.    He denies any bleeding side effects with his oral anticoagulant, Xarelto.    Past Medical History:   Diagnosis Date   • Arthritis     low back   • Bowel habit changes    • BPH (benign prostatic hyperplasia)    • CATARACT    • Chronic anticoagulation 5/16/2019   • Chronic systolic congestive heart failure ([facility]) 5/16/2019   • Glaucoma    • Heart murmur    • Hyperlipidemia    • Hypertension    • NICM (nonischemic cardiomyopathy) ([facility]) 5/16/2019   • Pain     Lower Back   • Persistent atrial fibrillation ([facility]) 8/28/2017   • Severe mitral regurgitation 5/16/2019     Past Surgical History:   Procedure Laterality Date   • TRIGGER FINGER RELEASE  10/8/2010    Performed by VIV COHEN at SURGERY SAME DAY [facility] ORS   • CATARACT PHACO WITH IOL  2008    BILATERAL   • HERNIA REPAIR     • TONSILLECTOMY      CHILD     Family History   Problem Relation Age of Onset   • Kidney Disease Mother    • Heart Disease Father      Social History     Social History   • Marital status:      Spouse name: N/A   • Number of children: N/A   • Years of education: N/A     Occupational History   • Not on file.     Social History Main Topics   • Smoking status: Former Smoker     Years: 3.00     Types: Pipe, Cigars     Quit date: 5/20/1972   • Smokeless tobacco: Never Used   • Alcohol use 1.2 oz/week     1 Cans of beer, 1 Shots of liquor per week      Comment: 1 x day   • Drug use: No   • Sexual activity: No     Other Topics Concern   • Not on file     Social History Narrative    Lives with wife, 1 story    Drives     Allergies   Allergen Reactions   • Nkda [No Known Drug Allergy]      Outpatient Encounter Prescriptions as of 5/16/2019   Medication Sig Dispense Refill    • timolol gel-forming (TIMOPTIC-XR) 0.5 % GEL FORMING SOLUTION Place 1 Drop in both eyes every day.     • lisinopril (PRINIVIL) 5 MG Tab Take 1 Tab by mouth 2 times a day. 60 Tab 11   • triamcinolone acetonide (KENALOG) 0.025 % Cream Apply  to affected area(s) 2 times a day.     • PARoxetine (PAXIL) 30 MG Tab Take 30 mg by mouth every day.     • atorvastatin (LIPITOR) 40 MG Tab      • spironolactone (ALDACTONE) 25 MG Tab Take 25 mg by mouth every day.     • furosemide (LASIX) 40 MG Tab Take 40 mg by mouth every day.     • potassium chloride SA (KDUR) 20 MEQ Tab CR Take 20 mEq by mouth 2 times a day.     • trazodone (DESYREL) 50 MG Tab Take 1-2 Tabs by mouth at bedtime as needed for Sleep. 180 Tab 1   • rivaroxaban (XARELTO) 20 MG Tab tablet Take 20 mg by mouth with dinner.     • Cholecalciferol (EQL VITAMIN D3) 2000 UNIT Cap Take 1 Cap by mouth every day. 30 Cap    • Omega-3 Fatty Acids (FISH OIL) 1200 MG Cap Take  by mouth every day.     • finasteride (PROSCAR) 5 MG TABS Take 5 mg by mouth every day.     • tamsulosin (FLOMAX) 0.4 MG capsule Take 0.4 mg by mouth ONE-HALF HOUR AFTER BREAKFAST. 2 tablets     • dorzolamide-timolol (COSOPT) 2-0.5 % SOLN Place 1 Drop in left eye 2 times a day.     • Ascorbic Acid (VITAMIN C PO) Take 500 mg by mouth.     • Multiple Vitamins-Minerals (CENTRUM SILVER PO) Take  by mouth.     • Psyllium (METAMUCIL PO) Take  by mouth every day.     • Polyethylene Glycol 3350 (MIRALAX PO) Take  by mouth as needed.     • [DISCONTINUED] amoxicillin (AMOXIL) 500 MG Cap      • [DISCONTINUED] SHINGRIX 50 MCG/0.5ML Recon Susp      • [DISCONTINUED] hydrocodone-acetaminophen (NORCO) 7.5-325 MG per tablet Take 1 Tab by mouth every four hours as needed (Pain). (Patient not taking: Reported on 5/16/2019) 20 Tab 0   • [DISCONTINUED] fluticasone (FLONASE) 50 MCG/ACT nasal spray Spray 1 Spray in nose every day. (Patient not taking: Reported on 5/16/2019) 16 g 3   • [DISCONTINUED] lisinopril (PRINIVIL) 20  "MG TABS Take 30 mg by mouth every day.     • simvastatin (ZOCOR) 40 MG TABS Take 40 mg by mouth every evening.     • ASPIRIN PO Take 81 mg by mouth.     • [DISCONTINUED] GLUCOSAMINE PO Take 1,500 mg by mouth.       No facility-administered encounter medications on file as of 5/16/2019.      Review of Systems   Constitutional: Positive for malaise/fatigue.   HENT: Positive for congestion.    Eyes: Positive for blurred vision.   Respiratory: Positive for shortness of breath.    Cardiovascular: Positive for chest pain, orthopnea and leg swelling.   Gastrointestinal: Positive for constipation.   Musculoskeletal: Positive for back pain.   Neurological: Positive for tingling and weakness.   Endo/Heme/Allergies: Bruises/bleeds easily.   Psychiatric/Behavioral: The patient is nervous/anxious.    All other systems reviewed and are negative.       Objective:   /78 (BP Location: Right arm, Patient Position: Sitting, BP Cuff Size: Adult)   Pulse 80   Ht 1.803 m (5' 11\")   Wt 81.4 kg (179 lb 9 oz)   SpO2 96%   BMI 25.04 kg/m²      Physical Exam   Constitutional: He is oriented to person, place, and time. No distress.   Pleasant, frail-appearing, elderly man in no distress   Eyes: Pupils are equal, round, and reactive to light. EOM are normal. No scleral icterus.   Neck: No JVD present.   Jugular venous pulsations measured at 8-9 cm H2O   Cardiovascular: Normal rate.  An irregularly irregular rhythm present. Exam reveals no gallop and no friction rub.    Murmur heard.   Systolic (Grade 3 blowing systolic murmur best appreciated in his axilla) murmur is present with a grade of 3/6   Pulmonary/Chest: Effort normal and breath sounds normal. No respiratory distress. He has no wheezes. He has no rales.   Abdominal: Soft. Bowel sounds are normal. He exhibits no distension.   Musculoskeletal: He exhibits edema (1+ bilateral lower extremity edema).   Neurological: He is alert and oriented to person, place, and time.   Skin: " Skin is warm and dry. No rash noted. He is not diaphoretic. No erythema. No pallor.   Nursing note and vitals reviewed.    Lab Results   Component Value Date/Time    WBC 6.7 02/12/2018 06:23 AM    RBC 4.49 (L) 02/12/2018 06:23 AM    HEMOGLOBIN 14.3 02/12/2018 06:23 AM    HEMATOCRIT 44.8 02/12/2018 06:23 AM    MCV 99.8 (H) 02/12/2018 06:23 AM    MCH 31.8 02/12/2018 06:23 AM    MCHC 31.9 (L) 02/12/2018 06:23 AM    MPV 10.3 02/12/2018 06:23 AM        Lab Results   Component Value Date/Time    SODIUM 140 02/12/2018 06:23 AM    POTASSIUM 4.5 02/12/2018 06:23 AM    CHLORIDE 105 02/12/2018 06:23 AM    CO2 28 02/12/2018 06:23 AM    GLUCOSE 100 (H) 02/12/2018 06:23 AM    BUN 14 02/12/2018 06:23 AM    CREATININE 0.94 02/12/2018 06:23 AM    CREATININE 0.9 06/01/2005 08:36 AM        Lab Results   Component Value Date/Time    ASTSGOT 23 02/12/2018 06:23 AM    ALTSGPT 30 02/12/2018 06:23 AM        Lab Results   Component Value Date/Time    CHOLSTRLTOT 147 12/20/2017 06:11 AM    LDL 67 12/20/2017 06:11 AM    HDL 65 12/20/2017 06:11 AM    TRIGLYCERIDE 74 12/20/2017 06:11 AM         No results found for this or any previous visit.    ECHOCARDIOGRAM: [facility] 2/6/2019  Moderate to severe mitral regurgitation, 2 central regurgitant jets visualized.  Mild tricuspid regurgitation  Mild aortic regurgitation  Ejection fraction 25 to 30%.     ANGIOGRAM: [facility] 2/6/2019  Severely depressed left ventricular systolic function  50% mid LAD stenosis  30 to 40% stenosis of a moderate sized OM.  30 to 40% stenosis at the origin of the PDA.    EKG, 5/16/2019, tracings and report reviewed, my interpretation: Demonstrates atrial fibrillation with a ventricular rate of 79 bpm and a left bundle branch block with a QRS duration of 162 ms    Assessment:     1. Chronic systolic congestive heart failure (HCC)  lisinopril (PRINIVIL) 5 MG Tab    Basic Metabolic Panel    REFERRAL TO CARDIAC ELECTROPHYSIOLOGY    EKG    EC-ECHOCARDIOGRAM COMPLETE W/O CONT   2.  Persistent atrial fibrillation (HCC)     3. Chronic anticoagulation     4. LUKAS (obstructive sleep apnea)  REFERRAL TO SLEEP STUDIES   5. Severe mitral regurgitation  EC-DEBORAH W/O CONT    REFERRAL TO CARDIOLOGY   6. NICM (nonischemic cardiomyopathy) (HCC)  EC-ECHOCARDIOGRAM COMPLETE W/O CONT       Medical Decision Making:  Today's Assessment / Status / Plan:     He is mildly hypervolemic with his systolic congestive heart failure.  His severe mitral regurgitation is symptomatic though the extent of it I cannot be entirely sure.  He mostly tells me today about daytime fatigue that I rather suspect is related to sleep apnea.  I reviewed his blood pressures and I do suspect that he will tolerate lisinopril at 5 mg p.o. twice daily.  I wonder if his cuff may be somewhat inaccurate and I asked him to bring his blood pressure cuff to be compared with ours at his next follow-up.  I have organized a transesophageal echocardiogram, and he will see the valve program after that study has been done.  I also ordered a repeat transthoracic echocardiogram to be done in 3 months with electrophysiology to see him either here or at [place] to discuss the possibility of a biventricular defibrillator, which I do think may substantially help him given his wide left bundle branch block.    Otherwise, his atrial fibrillation is rate controlled off of jaylyn blockers, and I do not suspect that he will benefit much from a beta-blocker in terms of his systolic congestive heart failure especially with his mild hypervolemia.  He is tolerating oral anticoagulants well, and I recommended that he continue Xarelto.    Nnamdi Youssef MD  Cardiologist, [facility] [place]         Pcp Not In Computer

## 2019-05-21 ENCOUNTER — APPOINTMENT (OUTPATIENT)
Dept: ADMISSIONS | Facility: MEDICAL CENTER | Age: 84
End: 2019-05-21
Attending: INTERNAL MEDICINE
Payer: MEDICARE

## 2019-05-22 ENCOUNTER — HOSPITAL ENCOUNTER (OUTPATIENT)
Dept: LAB | Facility: MEDICAL CENTER | Age: 84
End: 2019-05-22
Attending: INTERNAL MEDICINE
Payer: MEDICARE

## 2019-05-22 DIAGNOSIS — I50.22 CHRONIC SYSTOLIC CONGESTIVE HEART FAILURE (HCC): ICD-10-CM

## 2019-05-22 LAB
ANION GAP SERPL CALC-SCNC: 8 MMOL/L (ref 0–11.9)
BUN SERPL-MCNC: 15 MG/DL (ref 8–22)
CALCIUM SERPL-MCNC: 9.4 MG/DL (ref 8.5–10.5)
CHLORIDE SERPL-SCNC: 106 MMOL/L (ref 96–112)
CO2 SERPL-SCNC: 25 MMOL/L (ref 20–33)
CREAT SERPL-MCNC: 0.77 MG/DL (ref 0.5–1.4)
FASTING STATUS PATIENT QL REPORTED: NORMAL
GLUCOSE SERPL-MCNC: 96 MG/DL (ref 65–99)
POTASSIUM SERPL-SCNC: 4.6 MMOL/L (ref 3.6–5.5)
SODIUM SERPL-SCNC: 139 MMOL/L (ref 135–145)

## 2019-05-22 PROCEDURE — 80048 BASIC METABOLIC PNL TOTAL CA: CPT

## 2019-05-22 PROCEDURE — 36415 COLL VENOUS BLD VENIPUNCTURE: CPT

## 2019-05-23 ENCOUNTER — ANESTHESIA EVENT (OUTPATIENT)
Dept: CARDIOLOGY | Facility: MEDICAL CENTER | Age: 84
End: 2019-05-23

## 2019-05-23 ENCOUNTER — HOSPITAL ENCOUNTER (OUTPATIENT)
Facility: MEDICAL CENTER | Age: 84
End: 2019-05-23
Attending: INTERNAL MEDICINE | Admitting: INTERNAL MEDICINE
Payer: MEDICARE

## 2019-05-23 ENCOUNTER — APPOINTMENT (OUTPATIENT)
Dept: CARDIOLOGY | Facility: MEDICAL CENTER | Age: 84
End: 2019-05-23
Attending: INTERNAL MEDICINE
Payer: MEDICARE

## 2019-05-23 ENCOUNTER — ANESTHESIA (OUTPATIENT)
Dept: CARDIOLOGY | Facility: MEDICAL CENTER | Age: 84
End: 2019-05-23

## 2019-05-23 VITALS
OXYGEN SATURATION: 96 % | HEIGHT: 71 IN | SYSTOLIC BLOOD PRESSURE: 106 MMHG | RESPIRATION RATE: 14 BRPM | TEMPERATURE: 98.2 F | BODY MASS INDEX: 25.06 KG/M2 | DIASTOLIC BLOOD PRESSURE: 77 MMHG | HEART RATE: 62 BPM | WEIGHT: 179.01 LBS

## 2019-05-23 DIAGNOSIS — I34.0 SEVERE MITRAL REGURGITATION: ICD-10-CM

## 2019-05-23 LAB — LV EJECT FRACT  99904: 20

## 2019-05-23 PROCEDURE — 93325 DOPPLER ECHO COLOR FLOW MAPG: CPT

## 2019-05-23 PROCEDURE — 93312 ECHO TRANSESOPHAGEAL: CPT | Mod: 26 | Performed by: INTERNAL MEDICINE

## 2019-05-23 PROCEDURE — 160002 HCHG RECOVERY MINUTES (STAT)

## 2019-05-23 RX ORDER — ONDANSETRON 2 MG/ML
4 INJECTION INTRAMUSCULAR; INTRAVENOUS
Status: DISCONTINUED | OUTPATIENT
Start: 2019-05-23 | End: 2019-05-23 | Stop reason: HOSPADM

## 2019-05-23 RX ORDER — SODIUM CHLORIDE 9 MG/ML
INJECTION, SOLUTION INTRAVENOUS CONTINUOUS
Status: DISCONTINUED | OUTPATIENT
Start: 2019-05-23 | End: 2019-05-23 | Stop reason: HOSPADM

## 2019-05-23 ASSESSMENT — PAIN SCALES - GENERAL: PAIN_LEVEL: 0

## 2019-05-23 NOTE — DISCHARGE INSTRUCTIONS
ACTIVITY: Rest and take it easy for the first 24 hours.  A responsible adult is recommended to remain with you during that time.  It is normal to feel sleepy.  We encourage you to not do anything that requires balance, judgment or coordination.    MILD FLU-LIKE SYMPTOMS ARE NORMAL. YOU MAY EXPERIENCE GENERALIZED MUSCLE ACHES, THROAT IRRITATION, HEADACHE AND/OR SOME NAUSEA.    FOR 24 HOURS DO NOT:  Drive, operate machinery or run household appliances.  Drink beer or alcoholic beverages.   Make important decisions or sign legal documents.    SPECIAL INSTRUCTIONS: follow up with primary care physician call find    if you experience chest pain, shortness of breath call 911 return to ER   Resume your home medications  Follow up with your cardiologist call find out when to follow up  DIET: To avoid nausea, slowly advance diet as tolerated, avoiding spicy or greasy foods for the first day.  Add more substantial food to your diet according to your physician's instructions.  Babies can be fed formula or breast milk as soon as they are hungry.  INCREASE FLUIDS AND FIBER TO AVOID CONSTIPATION.    FOLLOW-UP APPOINTMENT:  A follow-up appointment should be arranged with your doctor in [phone]; call to schedule.    You should CALL YOUR PHYSICIAN if you develop:  Fever greater than 101 degrees F.  Pain not relieved by medication, or persistent nausea or vomiting.  Excessive bleeding (blood soaking through dressing) or unexpected drainage from the wound.  Extreme redness or swelling around the incision site, drainage of pus or foul smelling drainage.  Inability to urinate or empty your bladder within 8 hours.  Problems with breathing or chest pain.    You should call 911 if you develop problems with breathing or chest pain.  If you are unable to contact your doctor or surgical center, you should go to the nearest emergency room or urgent care center.  Physician's telephone #: [phone]    If any questions arise, call your doctor.   If your doctor is not available, please feel free to call the Surgical Center at [phone].  The Center is open Monday through Friday from 7AM to 7PM.  You can also call the HEALTH HOTLINE open 24 hours/day, 7 days/week and speak to a nurse at [phone], or toll free at [phone].    A registered nurse may call you a few days after your surgery to see how you are doing after your procedure.    MEDICATIONS: Resume taking daily medication.  Take prescribed pain medication with food.  If no medication is prescribed, you may take non-aspirin pain medication if needed.  PAIN MEDICATION CAN BE VERY CONSTIPATING.  Take a stool softener or laxative such as senokot, pericolace, or milk of magnesia if needed.    Transesophageal Echocardiogram  Transesophageal echocardiography (DEBORAH) is a picture test of your heart using sound waves. The pictures taken can give very detailed pictures of your heart. This can help your doctor see if there are problems with your heart. DEBORAH can check:  · If your heart has blood clots in it.  · How well your heart valves are working.  · If you have an infection on the inside of your heart.  · Some of the major arteries of your heart.  · If your heart valve is working after a repair.  · Your heart before a procedure that uses a shock to your heart to get the rhythm back to normal.  What happens before the procedure?  · Do not eat or drink for 6 hours before the procedure or as told by your doctor.  · Make plans to have someone drive you home after the procedure. Do not drive yourself home.  · An IV tube will be put in your arm.  What happens during the procedure?  · You will be given a medicine to help you relax (sedative). It will be given through the IV tube.  · A numbing medicine will be sprayed or gargled in the back of your throat to help numb it.  · The tip of the probe is placed into the back of your mouth. You will be asked to swallow. This helps to pass the probe into your  esophagus.  · Once the tip of the probe is in the right place, your doctor can take pictures of your heart.  · You may feel pressure at the back of your throat.  What happens after the procedure?  · You will be taken to a recovery area so the sedative can wear off.  · Your throat may be sore and scratchy. This will go away slowly over time.  · You will go home when you are fully awake and able to swallow liquids.  · You should have someone stay with you for the next 24 hours.  · Do not drive or operate machinery for the next 24 hours.  This information is not intended to replace advice given to you by your health care provider. Make sure you discuss any questions you have with your health care provider.  Document Released: 10/15/2010 Document Revised: 05/25/2017 Document Reviewed: 06/19/2014  [employer] Interactive Patient Education © 2017 [employer]      If your physician has prescribed pain medication that includes Acetaminophen (Tylenol), do not take additional Acetaminophen (Tylenol) while taking the prescribed medication.    Depression / Suicide Risk    As you are discharged from this [facility] facility, it is important to learn how to keep safe from harming yourself.    Recognize the warning signs:  · Abrupt changes in personality, positive or negative- including increase in energy   · Giving away possessions  · Change in eating patterns- significant weight changes-  positive or negative  · Change in sleeping patterns- unable to sleep or sleeping all the time   · Unwillingness or inability to communicate  · Depression  · Unusual sadness, discouragement and loneliness  · Talk of wanting to die  · Neglect of personal appearance   · Rebelliousness- reckless behavior  · Withdrawal from people/activities they love  · Confusion- inability to concentrate     If you or a loved one observes any of these behaviors or has concerns about self-harm, here's what you can do:  · Talk about it- your feelings and reasons  for harming yourself  · Remove any means that you might use to hurt yourself (examples: pills, rope, extension cords, firearm)  · Get professional help from the community (Mental Health, Substance Abuse, psychological counseling)  · Do not be alone:Call your Safe Contact- someone whom you trust who will be there for you.  · Call your local CRISIS HOTLINE 579-5540 or 091-376-8048  · Call your local Children's Mobile Crisis Response Team [place] (327) 106-5881 or www.My[employer]  · Call the toll free National Suicide Prevention Hotlines   · National Suicide Prevention Lifeline 532-851-TTEV (0674)  · National Hope Line Network 800-SUICIDE (216-6957)

## 2019-05-23 NOTE — ANESTHESIA QCDR
2019 Qualified Clinical Data Registry (for Quality Improvement)     Postoperative nausea/vomiting risk protocol (Adult = 18 yrs and Pediatric 3-17 yrs)- (430 and 463)  General inhalation anesthetic (NOT TIVA) with PONV risk factors: No  Provision of anti-emetic therapy with at least 2 different classes of agents: N/A  Patient DID NOT receive anti-emetic therapy and reason is documented in Medical Record: N/A    Multimodal Pain Management- (AQI59)  Patient undergoing Elective Surgery (i.e. Outpatient, or ASC, or Prescheduled Surgery prior to Hospital Admission): No  Use of Multimodal Pain Management, two or more drugs and/or interventions, NOT including systemic opioids: N/A  Exception: Documented allergy to multiple classes of analgesics: N/A    PACU assessment of acute postoperative pain prior to Anesthesia Care End- Applies to Patients Age = 18- (ABG7)  Initial PACU pain score is which of the following: < 7/10  Patient unable to report pain score: N/A    Post-anesthetic transfer of care checklist/protocol to PACU/ICU- (426 and 427)  Upon conclusion of case, patient transferred to which of the following locations: PACU/Non-ICU  Use of transfer checklist/protocol: Yes  Exclusion: Service Performed in Patient Hospital Room (and thus did not require transfer): N/A    PACU Reintubation- (AQI31)  General anesthesia requiring endotracheal intubation (ETT) along with subsequent extubation in OR or PACU: No  Required reintubation in the PACU: N/A  Extubation was a planned trial documented in the medical record prior to removal of the original airway device: N/A    Unplanned admission to ICU related to anesthesia service up through end of PACU care- (MD51)  Unplanned admission to ICU (not initially anticipated at anesthesia start time):

## 2019-05-23 NOTE — OR NURSING
1304 patient arrived from cath lab s/p DEBORAH, patient awake, denies pain, vss. Patient not in any distress.   1335 patient given water tolerating well   1345 Criteria met to discharge patient home.   1404 discharge instructions given to patient, patient verbalize understanding of the orders, iv discontinued tip intact, patient not in any distress or discomfort.   1435 patient escorted via w/c with all his personal belongings.

## 2019-05-23 NOTE — ANESTHESIA POSTPROCEDURE EVALUATION
Patient: Dannie Thurston    Procedure Summary     Date:  05/23/19 Room / Location:  [facility] - ECHOCARDIOLOGY [facility]    Anesthesia Start:   Anesthesia Stop:      Procedure:  EC-DEBORAH W/O CONT Diagnosis:  Severe mitral regurgitation    Scheduled Providers:  Terrie Paige M.D. Responsible Provider:      Anesthesia Type:  Not recorded ASA Status:  Not recorded          Final Anesthesia Type: No value filed.  Last vitals  BP   Blood Pressure : 106/77    Temp   36.5 °C (97.7 °F)    Pulse   Pulse: 82   Resp   18    SpO2   96 %      Anesthesia Post Evaluation    Patient location during evaluation: PACU  Patient participation: complete - patient participated  Level of consciousness: awake and alert  Pain score: 0    Airway patency: patent  Anesthetic complications: no  Cardiovascular status: hemodynamically stable  Respiratory status: acceptable  Hydration status: acceptable    PONV: none

## 2019-05-31 ENCOUNTER — OFFICE VISIT (OUTPATIENT)
Dept: CARDIOLOGY | Facility: MEDICAL CENTER | Age: 84
End: 2019-05-31
Payer: MEDICARE

## 2019-05-31 VITALS
BODY MASS INDEX: 25 KG/M2 | SYSTOLIC BLOOD PRESSURE: 102 MMHG | HEART RATE: 70 BPM | DIASTOLIC BLOOD PRESSURE: 60 MMHG | OXYGEN SATURATION: 94 % | HEIGHT: 71 IN | WEIGHT: 178.57 LBS

## 2019-05-31 DIAGNOSIS — I34.0 SEVERE MITRAL REGURGITATION: ICD-10-CM

## 2019-05-31 DIAGNOSIS — I48.19 PERSISTENT ATRIAL FIBRILLATION (HCC): ICD-10-CM

## 2019-05-31 DIAGNOSIS — I50.22 CHRONIC SYSTOLIC CONGESTIVE HEART FAILURE (HCC): ICD-10-CM

## 2019-05-31 DIAGNOSIS — I42.8 NICM (NONISCHEMIC CARDIOMYOPATHY) (HCC): ICD-10-CM

## 2019-05-31 DIAGNOSIS — Z79.01 CHRONIC ANTICOAGULATION: ICD-10-CM

## 2019-05-31 PROCEDURE — 99204 OFFICE O/P NEW MOD 45 MIN: CPT | Performed by: INTERNAL MEDICINE

## 2019-05-31 ASSESSMENT — ENCOUNTER SYMPTOMS
ABDOMINAL PAIN: 0
COUGH: 0
FOCAL WEAKNESS: 1
CLAUDICATION: 0
BLURRED VISION: 1
BACK PAIN: 1
DOUBLE VISION: 0
NAUSEA: 0
TINGLING: 1
FEVER: 0
VOMITING: 0
SHORTNESS OF BREATH: 1
PALPITATIONS: 0
PSYCHIATRIC NEGATIVE: 1
WEIGHT LOSS: 0
MYALGIAS: 0
NERVOUS/ANXIOUS: 0
DIZZINESS: 0
DEPRESSION: 0
WEAKNESS: 1
CHILLS: 0
BRUISES/BLEEDS EASILY: 1
CONSTIPATION: 1
HEADACHES: 0

## 2019-05-31 NOTE — PROGRESS NOTES
Chief Complaint   Patient presents with   • Fatigue       Subjective:   Dannie Thurston is a 85 y.o. male who presents today for interventional consult/MitraClip evaluation requested by Juan F Garcia and Nnamdi Borja for severe symptomatic mitral regurgitation.    Thank you for allowing me to evaluate Mr. Thurston, who as you know is a 85 year old male with severe mitral regurgitation, chronic systolic congenital heart failure, dilated cardiomyopathy, atrial fibrillation on chronic anticoagulation therapy. He was well until one year ago when he began to experience progressive to severe fatigue, moderate shortness of breath and dyspnea on exertion and mild lightheadedness. He denies chest pain, dizziness or syncope.    Past Medical History:   Diagnosis Date   • Arthritis     low back   • Bowel habit changes     constipation   • BPH (benign prostatic hyperplasia)    • Breath shortness     with exertion   • Bronchitis 2018   • CATARACT    • Chronic anticoagulation 5/16/2019   • Chronic systolic congestive heart failure (HCC) 5/16/2019   • Glaucoma     left eye   • Heart murmur    • High cholesterol    • Hyperlipidemia    • Hypertension     pt states well controlled on meds/ states at times bp is low   • NICM (nonischemic cardiomyopathy) ([facility]) 5/16/2019   • Pain     Lower Back   • Persistent atrial fibrillation ([facility]) 8/28/2017   • Psychiatric problem     anxiety   • Severe mitral regurgitation 5/16/2019   • Valvular heart disease      Past Surgical History:   Procedure Laterality Date   • TRIGGER FINGER RELEASE  10/8/2010    Performed by VIV COHEN at SURGERY SAME DAY [facility]   • CATARACT PHACO WITH IOL  2008    BILATERAL   • HERNIA REPAIR     • TONSILLECTOMY      CHILD     Family History   Problem Relation Age of Onset   • Kidney Disease Mother    • Heart Disease Father      Social History     Social History   • Marital status:      Spouse name: N/A   • Number of children: N/A   • Years of  education: N/A     Occupational History   • Not on file.     Social History Main Topics   • Smoking status: Former Smoker     Years: 3.00     Types: Pipe, Cigars     Quit date: 5/20/1972   • Smokeless tobacco: Never Used   • Alcohol use No   • Drug use: No   • Sexual activity: No     Other Topics Concern   • Not on file     Social History Narrative    Lives with wife, 1 story    Drives     Allergies   Allergen Reactions   • Nkda [No Known Drug Allergy]      Medications reviewed.    Outpatient Encounter Prescriptions as of 5/31/2019   Medication Sig Dispense Refill   • timolol gel-forming (TIMOPTIC-XR) 0.5 % GEL FORMING SOLUTION Place 1 Drop in left eye every day.     • lisinopril (PRINIVIL) 5 MG Tab Take 1 Tab by mouth 2 times a day. 60 Tab 11   • triamcinolone acetonide (KENALOG) 0.025 % Cream Apply  to affected area(s) 2 times a day.     • PARoxetine (PAXIL) 30 MG Tab Take 30 mg by mouth every day.     • atorvastatin (LIPITOR) 40 MG Tab      • spironolactone (ALDACTONE) 25 MG Tab Take 25 mg by mouth every 48 hours.     • furosemide (LASIX) 40 MG Tab Take 40 mg by mouth every 48 hours.     • potassium chloride SA (KDUR) 20 MEQ Tab CR Take 20 mEq by mouth every 48 hours.     • rivaroxaban (XARELTO) 20 MG Tab tablet Take 20 mg by mouth with dinner.     • Cholecalciferol (EQL VITAMIN D3) 2000 UNIT Cap Take 1 Cap by mouth every day. 30 Cap    • Omega-3 Fatty Acids (FISH OIL) 1200 MG Cap Take  by mouth every day.     • finasteride (PROSCAR) 5 MG TABS Take 5 mg by mouth every day.     • tamsulosin (FLOMAX) 0.4 MG capsule Take 0.4 mg by mouth ONE-HALF HOUR AFTER BREAKFAST. 2 tablets     • Ascorbic Acid (VITAMIN C PO) Take 500 mg by mouth.     • Multiple Vitamins-Minerals (CENTRUM SILVER PO) Take  by mouth.     • trazodone (DESYREL) 50 MG Tab Take 1-2 Tabs by mouth at bedtime as needed for Sleep. (Patient not taking: Reported on 5/31/2019) 180 Tab 1   • Psyllium (METAMUCIL PO) Take  by mouth every day.     • Polyethylene  "Glycol 3350 (MIRALAX PO) Take  by mouth as needed.     • [DISCONTINUED] dorzolamide-timolol (COSOPT) 2-0.5 % SOLN Place 1 Drop in left eye 2 times a day.       No facility-administered encounter medications on file as of 5/31/2019.      Review of Systems   Constitutional: Positive for malaise/fatigue. Negative for chills, fever and weight loss.   HENT: Positive for congestion. Negative for hearing loss.    Eyes: Positive for blurred vision. Negative for double vision.   Respiratory: Positive for shortness of breath. Negative for cough.    Cardiovascular: Positive for leg swelling. Negative for chest pain, palpitations and claudication.   Gastrointestinal: Positive for constipation. Negative for abdominal pain, nausea and vomiting.   Genitourinary: Negative.  Negative for dysuria and urgency.   Musculoskeletal: Positive for back pain. Negative for joint pain and myalgias.   Skin: Negative.  Negative for itching and rash.   Neurological: Positive for tingling, focal weakness and weakness. Negative for dizziness and headaches.   Endo/Heme/Allergies: Bruises/bleeds easily.   Psychiatric/Behavioral: Negative.  Negative for depression. The patient is not nervous/anxious.         Objective:   /60 (BP Location: Left arm, Patient Position: Sitting, BP Cuff Size: Adult)   Pulse 70   Ht 1.803 m (5' 11\")   Wt 81 kg (178 lb 9.2 oz)   SpO2 94%   BMI 24.91 kg/m²     Physical Exam   Constitutional: He is oriented to person, place, and time. He appears well-developed and well-nourished.   Using cane.   HENT:   Head: Normocephalic and atraumatic.   Eyes: EOM are normal.   Neck: No JVD present.   Cardiovascular: Normal rate.  An irregularly irregular rhythm present.   Murmur heard.  Pulmonary/Chest: Effort normal and breath sounds normal.   Abdominal: Soft. Bowel sounds are normal.   No hepatosplenomegaly.   Musculoskeletal: Normal range of motion. He exhibits edema.   Lymphadenopathy:     He has no cervical adenopathy. "   Neurological: He is alert and oriented to person, place, and time.   Skin: Skin is warm and dry.   Psychiatric: He has a normal mood and affect.     CARDIAC STUDIES/PROCEDURES:    ECHOCARDIOGRAM CONCLUSIONS (02/19/15)  Mild concentric left ventricular hypertrophy.  Left ventricular ejection fraction is 60% to 65%.  Trace mitral regurgitation.  Right ventricular systolic pressure is estimated to be 26 mmHg.  Dilated ascending aorta, 4.5 cm.  (study result reviewed)    EKG performed on (05/16/19) was reviewed: EKG shows atrial fibrillation with left bundle branch block.    Laboratory results of (05/22/19) were reviewed. Bun of 15 mg/dl, creatinine levels of 0.77 mg/dl noted.    TRANSESOPHAGEAL ECHOCARDIOGRAM CONCLUSIONS by Terrie Rodriguez (05/23/19)  Only prior echo from 2-19-15, EF now severely reduced, moderate MR, ascending aorta similar.  Criteria for MitraClip not met at this time.  Severely reduced left ventricular systolic function.  Left ventricular ejection fraction is visually estimated to be 20%.  Global hypokinesis, probable further wall motion abnormalites of the inferior wall, abnormal septal motion c/w conduction abnormality.   Dilated left atrium.  Spontaneous echo contrast seen in the left atrial appendage.  Structurally normal mitral valve.  Moderate central mitral regurgitation.  Aortic valve sclerosis with reduced excursion without stenosis.  Mild aortic insufficiency.  Mild tricuspid regurgitation.  Asc aorta 4.2 cm.  (study result reviewed)    Assessment:     1. Severe mitral regurgitation     2. Chronic systolic congestive heart failure (HCC)     3. NICM (nonischemic cardiomyopathy) (HCC)     4. Persistent atrial fibrillation (HCC)     5. Chronic anticoagulation         Medical Decision Making:  Today's Assessment / Status / Plan:     1. Severe mitral regurgitation: He is symptomatic with his severe mitral regurgitation, however, he is not a candidate for MitraClip due to excessive risk as  it related to his left ventricular systolic dysfunction.  2. Chronic systolic congestive heart failure with cardiomyopathy: The overall volume status is mildly elevated.  3. Atrial fibrillation on anticoagulation therapy (Xarelto): He is doing well on anticoagulation and the ventricular rate is well controlled.  Greater than 30 minutes of time was spent to review all above information, discuss the option of cardiac catheterization, transesophageal echocardiogram and MitraClip including, alternative options, risk and benefits as stated in the assessment and plan.    We will see the patient as needed. He will follow up with Juan F Garcia    Thank you for this consult.

## 2019-05-31 NOTE — LETTER
[facility]   [address] [facility] [address]  [place] [address]  Phone: [phone]  Fax: [phone]              Dannie Thurston  5/5/1934    Encounter Date: 5/31/2019    Efrain Wagner M.D.          PROGRESS NOTE:  Chief Complaint   Patient presents with   • Fatigue       Subjective:   Dannie Thurston is a 85 y.o. male who presents today for interventional consult/MitraClip evaluation requested by Juan F Garcia and Nnamdi Borja for severe symptomatic mitral regurgitation.    Thank you for allowing me to evaluate Mr. Thurston, who as you know is a 85 year old male with severe mitral regurgitation, chronic systolic congenital heart failure, dilated cardiomyopathy, atrial fibrillation on chronic anticoagulation therapy. He was well until one year ago when he began to experience progressive to severe fatigue, moderate shortness of breath and dyspnea on exertion. He denies chest pain, dizziness or syncope.    Past Medical History:   Diagnosis Date   • Arthritis     low back   • Bowel habit changes     constipation   • BPH (benign prostatic hyperplasia)    • Breath shortness     with exertion   • Bronchitis 2018   • CATARACT    • Chronic anticoagulation 5/16/2019   • Chronic systolic congestive heart failure (HCC) 5/16/2019   • Glaucoma     left eye   • Heart murmur    • High cholesterol    • Hyperlipidemia    • Hypertension     pt states well controlled on meds/ states at times bp is low   • NICM (nonischemic cardiomyopathy) ([facility]) 5/16/2019   • Pain     Lower Back   • Persistent atrial fibrillation (HCC) 8/28/2017   • Psychiatric problem     anxiety   • Severe mitral regurgitation 5/16/2019   • Valvular heart disease      Past Surgical History:   Procedure Laterality Date   • TRIGGER FINGER RELEASE  10/8/2010    Performed by VIV COHEN at SURGERY SAME DAY [facility] ORS   • CATARACT PHACO WITH IOL  2008    BILATERAL   • HERNIA REPAIR     • TONSILLECTOMY      CHILD        Family History   Problem Relation Age of Onset   • Kidney Disease Mother    • Heart Disease Father      Social History     Social History   • Marital status:      Spouse name: N/A   • Number of children: N/A   • Years of education: N/A     Occupational History   • Not on file.     Social History Main Topics   • Smoking status: Former Smoker     Years: 3.00     Types: Pipe, Cigars     Quit date: 5/20/1972   • Smokeless tobacco: Never Used   • Alcohol use No   • Drug use: No   • Sexual activity: No     Other Topics Concern   • Not on file     Social History Narrative    Lives with wife, 1 story    Drives     Allergies   Allergen Reactions   • Nkda [No Known Drug Allergy]      Medications reviewed.    Outpatient Encounter Prescriptions as of 5/31/2019   Medication Sig Dispense Refill   • timolol gel-forming (TIMOPTIC-XR) 0.5 % GEL FORMING SOLUTION Place 1 Drop in left eye every day.     • lisinopril (PRINIVIL) 5 MG Tab Take 1 Tab by mouth 2 times a day. 60 Tab 11   • triamcinolone acetonide (KENALOG) 0.025 % Cream Apply  to affected area(s) 2 times a day.     • PARoxetine (PAXIL) 30 MG Tab Take 30 mg by mouth every day.     • atorvastatin (LIPITOR) 40 MG Tab      • spironolactone (ALDACTONE) 25 MG Tab Take 25 mg by mouth every 48 hours.     • furosemide (LASIX) 40 MG Tab Take 40 mg by mouth every 48 hours.     • potassium chloride SA (KDUR) 20 MEQ Tab CR Take 20 mEq by mouth every 48 hours.     • rivaroxaban (XARELTO) 20 MG Tab tablet Take 20 mg by mouth with dinner.     • Cholecalciferol (EQL VITAMIN D3) 2000 UNIT Cap Take 1 Cap by mouth every day. 30 Cap    • Omega-3 Fatty Acids (FISH OIL) 1200 MG Cap Take  by mouth every day.     • finasteride (PROSCAR) 5 MG TABS Take 5 mg by mouth every day.     • tamsulosin (FLOMAX) 0.4 MG capsule Take 0.4 mg by mouth ONE-HALF HOUR AFTER BREAKFAST. 2 tablets     • Ascorbic Acid (VITAMIN C PO) Take 500 mg by mouth.     • Multiple Vitamins-Minerals (CENTRUM SILVER PO)  "Take  by mouth.     • trazodone (DESYREL) 50 MG Tab Take 1-2 Tabs by mouth at bedtime as needed for Sleep. (Patient not taking: Reported on 5/31/2019) 180 Tab 1   • Psyllium (METAMUCIL PO) Take  by mouth every day.     • Polyethylene Glycol 3350 (MIRALAX PO) Take  by mouth as needed.     • [DISCONTINUED] dorzolamide-timolol (COSOPT) 2-0.5 % SOLN Place 1 Drop in left eye 2 times a day.       No facility-administered encounter medications on file as of 5/31/2019.      Review of Systems   Constitutional: Positive for malaise/fatigue. Negative for chills, fever and weight loss.   HENT: Positive for congestion. Negative for hearing loss.    Eyes: Positive for blurred vision. Negative for double vision.   Respiratory: Positive for shortness of breath. Negative for cough.    Cardiovascular: Positive for leg swelling. Negative for chest pain, palpitations and claudication.   Gastrointestinal: Positive for constipation. Negative for abdominal pain, nausea and vomiting.   Genitourinary: Negative.  Negative for dysuria and urgency.   Musculoskeletal: Positive for back pain. Negative for joint pain and myalgias.   Skin: Negative.  Negative for itching and rash.   Neurological: Positive for tingling, focal weakness and weakness. Negative for dizziness and headaches.   Endo/Heme/Allergies: Bruises/bleeds easily.   Psychiatric/Behavioral: Negative.  Negative for depression. The patient is not nervous/anxious.         Objective:   /60 (BP Location: Left arm, Patient Position: Sitting, BP Cuff Size: Adult)   Pulse 70   Ht 1.803 m (5' 11\")   Wt 81 kg (178 lb 9.2 oz)   SpO2 94%   BMI 24.91 kg/m²      Physical Exam   Constitutional: He is oriented to person, place, and time. He appears well-developed and well-nourished.   Using cane.   HENT:   Head: Normocephalic and atraumatic.   Eyes: EOM are normal.   Neck: No JVD present.   Cardiovascular: Normal rate.  An irregularly irregular rhythm present.   Murmur " heard.  Pulmonary/Chest: Effort normal and breath sounds normal.   Abdominal: Soft. Bowel sounds are normal.   No hepatosplenomegaly.   Musculoskeletal: Normal range of motion. He exhibits edema.   Lymphadenopathy:     He has no cervical adenopathy.   Neurological: He is alert and oriented to person, place, and time.   Skin: Skin is warm and dry.   Psychiatric: He has a normal mood and affect.     CARDIAC STUDIES/PROCEDURES:    ECHOCARDIOGRAM CONCLUSIONS (02/19/15)  Mild concentric left ventricular hypertrophy.  Left ventricular ejection fraction is 60% to 65%.  Trace mitral regurgitation.  Right ventricular systolic pressure is estimated to be 26 mmHg.  Dilated ascending aorta, 4.5 cm.  (study result reviewed)    EKG performed on (05/16/19) was reviewed: EKG shows atrial fibrillation with left bundle branch block.    Laboratory results of (05/22/19) were reviewed. Bun of 15 mg/dl, creatinine levels of 0.77 mg/dl noted.    TRANSESOPHAGEAL ECHOCARDIOGRAM CONCLUSIONS by Terrie Rodriguez (05/23/19)  Only prior echo from 2-19-15, EF now severely reduced, moderate MR, ascending aorta similar.  Criteria for MitraClip not met at this time.  Severely reduced left ventricular systolic function.  Left ventricular ejection fraction is visually estimated to be 20%.  Global hypokinesis, probable further wall motion abnormalites of the inferior wall, abnormal septal motion c/w conduction abnormality.   Dilated left atrium.  Spontaneous echo contrast seen in the left atrial appendage.  Structurally normal mitral valve.  Moderate central mitral regurgitation.  Aortic valve sclerosis with reduced excursion without stenosis.  Mild aortic insufficiency.  Mild tricuspid regurgitation.  Asc aorta 4.2 cm.  (study result reviewed)    Assessment:     1. Severe mitral regurgitation     2. Chronic systolic congestive heart failure (HCC)     3. NICM (nonischemic cardiomyopathy) (HCC)     4. Persistent atrial fibrillation (HCC)     5.  Chronic anticoagulation         Medical Decision Making:  Today's Assessment / Status / Plan:     1. Severe mitral regurgitation: He is symptomatic with his severe mitral regurgitation, however, he is not a candidate for MitraClip due to excessive risk as it related to his left ventricular systolic dysfunction.  2. Chronic systolic congestive heart failure with cardiomyopathy: The overall volume status is mildly elevated.  3. Atrial fibrillation on anticoagulation therapy (Xarelto): He is doing well on anticoagulation and the ventricular rate is well controlled.  Greater than 30 minutes of time was spent to review all above information, discuss the option of cardiac catheterization, transesophageal echocardiogram and MitraClip including, alternative options, risk and benefits as stated in the assessment and plan.    We will see the patient as needed. He will follow up with Juan F Garcia    Thank you for this consult.          No Recipients

## 2019-10-30 ENCOUNTER — APPOINTMENT (RX ONLY)
Dept: URBAN - METROPOLITAN AREA CLINIC 4 | Facility: CLINIC | Age: 84
Setting detail: DERMATOLOGY
End: 2019-10-30

## 2019-10-30 DIAGNOSIS — D22 MELANOCYTIC NEVI: ICD-10-CM

## 2019-10-30 DIAGNOSIS — L20.89 OTHER ATOPIC DERMATITIS: ICD-10-CM

## 2019-10-30 DIAGNOSIS — Z85.828 PERSONAL HISTORY OF OTHER MALIGNANT NEOPLASM OF SKIN: ICD-10-CM

## 2019-10-30 DIAGNOSIS — L81.4 OTHER MELANIN HYPERPIGMENTATION: ICD-10-CM

## 2019-10-30 DIAGNOSIS — D18.0 HEMANGIOMA: ICD-10-CM

## 2019-10-30 DIAGNOSIS — L82.1 OTHER SEBORRHEIC KERATOSIS: ICD-10-CM

## 2019-10-30 PROBLEM — D22.5 MELANOCYTIC NEVI OF TRUNK: Status: ACTIVE | Noted: 2019-10-30

## 2019-10-30 PROBLEM — L20.84 INTRINSIC (ALLERGIC) ECZEMA: Status: ACTIVE | Noted: 2019-10-30

## 2019-10-30 PROBLEM — D18.01 HEMANGIOMA OF SKIN AND SUBCUTANEOUS TISSUE: Status: ACTIVE | Noted: 2019-10-30

## 2019-10-30 PROCEDURE — 99213 OFFICE O/P EST LOW 20 MIN: CPT

## 2019-10-30 PROCEDURE — ? PRESCRIPTION

## 2019-10-30 PROCEDURE — ? COUNSELING

## 2019-10-30 RX ORDER — TRIAMCINOLONE ACETONIDE 1 MG/G
CREAM TOPICAL BID
Qty: 1 | Refills: 6 | Status: ERX | COMMUNITY
Start: 2019-10-30

## 2019-10-30 RX ADMIN — TRIAMCINOLONE ACETONIDE 1: 1 CREAM TOPICAL at 00:00

## 2019-10-30 ASSESSMENT — LOCATION DETAILED DESCRIPTION DERM
LOCATION DETAILED: RIGHT SUPERIOR MEDIAL MIDBACK
LOCATION DETAILED: INFERIOR THORACIC SPINE
LOCATION DETAILED: MID-FRONTAL SCALP
LOCATION DETAILED: LEFT SUPERIOR MEDIAL MIDBACK
LOCATION DETAILED: LEFT PROXIMAL POSTERIOR UPPER ARM
LOCATION DETAILED: RIGHT PROXIMAL POSTERIOR UPPER ARM
LOCATION DETAILED: LEFT SUPERIOR UPPER BACK

## 2019-10-30 ASSESSMENT — LOCATION ZONE DERM
LOCATION ZONE: SCALP
LOCATION ZONE: ARM
LOCATION ZONE: TRUNK

## 2019-10-30 ASSESSMENT — LOCATION SIMPLE DESCRIPTION DERM
LOCATION SIMPLE: UPPER BACK
LOCATION SIMPLE: LEFT LOWER BACK
LOCATION SIMPLE: ANTERIOR SCALP
LOCATION SIMPLE: RIGHT LOWER BACK
LOCATION SIMPLE: LEFT UPPER ARM
LOCATION SIMPLE: RIGHT UPPER ARM
LOCATION SIMPLE: LEFT UPPER BACK

## 2020-06-04 ENCOUNTER — HOSPITAL ENCOUNTER (OUTPATIENT)
Dept: HOSPITAL 8 - CFH | Age: 85
Discharge: HOME | End: 2020-06-04
Attending: INTERNAL MEDICINE
Payer: MEDICARE

## 2020-06-04 DIAGNOSIS — I11.9: ICD-10-CM

## 2020-06-04 DIAGNOSIS — I08.3: Primary | ICD-10-CM

## 2020-06-04 DIAGNOSIS — I25.5: ICD-10-CM

## 2020-06-04 PROCEDURE — 93306 TTE W/DOPPLER COMPLETE: CPT

## 2020-06-04 PROCEDURE — 93356 MYOCRD STRAIN IMG SPCKL TRCK: CPT

## 2020-06-09 ENCOUNTER — HOSPITAL ENCOUNTER (OUTPATIENT)
Dept: HOSPITAL 8 - CFH | Age: 85
Discharge: HOME | End: 2020-06-09
Attending: PHYSICIAN ASSISTANT
Payer: MEDICARE

## 2020-06-09 DIAGNOSIS — I48.0: ICD-10-CM

## 2020-06-09 DIAGNOSIS — I34.0: ICD-10-CM

## 2020-06-09 DIAGNOSIS — I50.23: ICD-10-CM

## 2020-06-09 DIAGNOSIS — I47.2: ICD-10-CM

## 2020-06-09 DIAGNOSIS — I71.4: ICD-10-CM

## 2020-06-09 DIAGNOSIS — I25.5: ICD-10-CM

## 2020-06-09 DIAGNOSIS — Z95.810: ICD-10-CM

## 2020-06-09 DIAGNOSIS — I11.0: Primary | ICD-10-CM

## 2020-06-09 DIAGNOSIS — R53.83: ICD-10-CM

## 2020-06-09 PROCEDURE — 71046 X-RAY EXAM CHEST 2 VIEWS: CPT

## 2020-06-30 ENCOUNTER — APPOINTMENT (RX ONLY)
Dept: URBAN - METROPOLITAN AREA CLINIC 4 | Facility: CLINIC | Age: 85
Setting detail: DERMATOLOGY
End: 2020-06-30

## 2020-06-30 DIAGNOSIS — D22 MELANOCYTIC NEVI: ICD-10-CM

## 2020-06-30 DIAGNOSIS — L85.8 OTHER SPECIFIED EPIDERMAL THICKENING: ICD-10-CM

## 2020-06-30 DIAGNOSIS — Z85.828 PERSONAL HISTORY OF OTHER MALIGNANT NEOPLASM OF SKIN: ICD-10-CM

## 2020-06-30 DIAGNOSIS — L20.89 OTHER ATOPIC DERMATITIS: ICD-10-CM | Status: RESOLVED

## 2020-06-30 DIAGNOSIS — D18.0 HEMANGIOMA: ICD-10-CM

## 2020-06-30 DIAGNOSIS — L82.1 OTHER SEBORRHEIC KERATOSIS: ICD-10-CM

## 2020-06-30 DIAGNOSIS — L81.4 OTHER MELANIN HYPERPIGMENTATION: ICD-10-CM

## 2020-06-30 PROBLEM — L20.84 INTRINSIC (ALLERGIC) ECZEMA: Status: ACTIVE | Noted: 2020-06-30

## 2020-06-30 PROBLEM — D22.5 MELANOCYTIC NEVI OF TRUNK: Status: ACTIVE | Noted: 2020-06-30

## 2020-06-30 PROBLEM — D18.01 HEMANGIOMA OF SKIN AND SUBCUTANEOUS TISSUE: Status: ACTIVE | Noted: 2020-06-30

## 2020-06-30 PROCEDURE — 99213 OFFICE O/P EST LOW 20 MIN: CPT | Mod: 25

## 2020-06-30 PROCEDURE — ? LIQUID NITROGEN

## 2020-06-30 PROCEDURE — ? OBSERVATION

## 2020-06-30 PROCEDURE — 17000 DESTRUCT PREMALG LESION: CPT

## 2020-06-30 ASSESSMENT — LOCATION SIMPLE DESCRIPTION DERM
LOCATION SIMPLE: RIGHT LOWER BACK
LOCATION SIMPLE: RIGHT CHEEK
LOCATION SIMPLE: LEFT LOWER BACK
LOCATION SIMPLE: UPPER BACK
LOCATION SIMPLE: RIGHT UPPER ARM
LOCATION SIMPLE: LEFT UPPER BACK
LOCATION SIMPLE: ANTERIOR SCALP
LOCATION SIMPLE: LEFT UPPER ARM

## 2020-06-30 ASSESSMENT — LOCATION ZONE DERM
LOCATION ZONE: FACE
LOCATION ZONE: SCALP
LOCATION ZONE: TRUNK
LOCATION ZONE: ARM

## 2020-06-30 ASSESSMENT — LOCATION DETAILED DESCRIPTION DERM
LOCATION DETAILED: LEFT SUPERIOR MEDIAL MIDBACK
LOCATION DETAILED: LEFT SUPERIOR UPPER BACK
LOCATION DETAILED: RIGHT SUPERIOR MEDIAL MIDBACK
LOCATION DETAILED: RIGHT SUPERIOR CENTRAL MALAR CHEEK
LOCATION DETAILED: LEFT PROXIMAL POSTERIOR UPPER ARM
LOCATION DETAILED: RIGHT PROXIMAL POSTERIOR UPPER ARM
LOCATION DETAILED: MID-FRONTAL SCALP
LOCATION DETAILED: INFERIOR THORACIC SPINE

## 2020-06-30 NOTE — PROCEDURE: LIQUID NITROGEN
Number Of Freeze-Thaw Cycles: 1 freeze-thaw cycle
Post-Care Instructions: I reviewed with the patient in detail post-care instructions. Patient is to wear sunprotection, and avoid picking at any of the treated lesions. Pt may apply Vaseline to crusted or scabbing areas.
Duration Of Freeze Thaw-Cycle (Seconds): 5
Consent: The patient's consent was obtained including but not limited to risks of crusting, scabbing, blistering, scarring, darker or lighter pigmentary change, recurrence, incomplete removal and infection.
Render Note In Bullet Format When Appropriate: No
Detail Level: Detailed

## 2020-06-30 NOTE — PROCEDURE: REASSURANCE
Include Location In Plan?: Yes
Detail Level: Zone
Detail Level: Generalized
Include Location In Plan?: No
Detail Level: Detailed

## 2020-09-30 ENCOUNTER — HOSPITAL ENCOUNTER (OUTPATIENT)
Dept: HOSPITAL 8 - CVU | Age: 85
Discharge: HOME | End: 2020-09-30
Attending: INTERNAL MEDICINE
Payer: MEDICARE

## 2020-09-30 DIAGNOSIS — Z95.810: ICD-10-CM

## 2020-09-30 DIAGNOSIS — I34.0: Primary | ICD-10-CM

## 2020-09-30 DIAGNOSIS — I51.7: ICD-10-CM

## 2020-09-30 PROCEDURE — 93321 DOPPLER ECHO F-UP/LMTD STD: CPT

## 2020-09-30 PROCEDURE — 93325 DOPPLER ECHO COLOR FLOW MAPG: CPT

## 2020-09-30 PROCEDURE — 93308 TTE F-UP OR LMTD: CPT

## 2020-11-04 ENCOUNTER — APPOINTMENT (RX ONLY)
Dept: URBAN - METROPOLITAN AREA CLINIC 4 | Facility: CLINIC | Age: 85
Setting detail: DERMATOLOGY
End: 2020-11-04

## 2020-11-04 DIAGNOSIS — L82.1 OTHER SEBORRHEIC KERATOSIS: ICD-10-CM

## 2020-11-04 DIAGNOSIS — L11.1 TRANSIENT ACANTHOLYTIC DERMATOSIS [GROVER]: ICD-10-CM

## 2020-11-04 DIAGNOSIS — D18.0 HEMANGIOMA: ICD-10-CM

## 2020-11-04 DIAGNOSIS — Z85.828 PERSONAL HISTORY OF OTHER MALIGNANT NEOPLASM OF SKIN: ICD-10-CM

## 2020-11-04 DIAGNOSIS — L81.4 OTHER MELANIN HYPERPIGMENTATION: ICD-10-CM

## 2020-11-04 DIAGNOSIS — D22 MELANOCYTIC NEVI: ICD-10-CM

## 2020-11-04 PROBLEM — D18.01 HEMANGIOMA OF SKIN AND SUBCUTANEOUS TISSUE: Status: ACTIVE | Noted: 2020-11-04

## 2020-11-04 PROBLEM — D22.5 MELANOCYTIC NEVI OF TRUNK: Status: ACTIVE | Noted: 2020-11-04

## 2020-11-04 PROCEDURE — ? COUNSELING

## 2020-11-04 PROCEDURE — 99213 OFFICE O/P EST LOW 20 MIN: CPT

## 2020-11-04 PROCEDURE — ? PRESCRIPTION

## 2020-11-04 RX ORDER — TRIAMCINOLONE ACETONIDE 1 MG/G
1 CREAM TOPICAL TWICE DAILY
Qty: 1 | Refills: 6 | Status: ERX | COMMUNITY
Start: 2020-11-04

## 2020-11-04 RX ADMIN — TRIAMCINOLONE ACETONIDE 1: 1 CREAM TOPICAL at 00:00

## 2020-11-04 ASSESSMENT — LOCATION SIMPLE DESCRIPTION DERM
LOCATION SIMPLE: UPPER BACK
LOCATION SIMPLE: RIGHT UPPER ARM
LOCATION SIMPLE: ANTERIOR SCALP
LOCATION SIMPLE: LEFT CHEEK
LOCATION SIMPLE: LEFT UPPER BACK
LOCATION SIMPLE: LEFT LOWER BACK
LOCATION SIMPLE: LEFT UPPER ARM
LOCATION SIMPLE: RIGHT LOWER BACK

## 2020-11-04 ASSESSMENT — LOCATION ZONE DERM
LOCATION ZONE: FACE
LOCATION ZONE: SCALP
LOCATION ZONE: ARM
LOCATION ZONE: TRUNK

## 2020-11-04 ASSESSMENT — LOCATION DETAILED DESCRIPTION DERM
LOCATION DETAILED: LEFT PROXIMAL POSTERIOR UPPER ARM
LOCATION DETAILED: LEFT INFERIOR CENTRAL MALAR CHEEK
LOCATION DETAILED: RIGHT SUPERIOR MEDIAL MIDBACK
LOCATION DETAILED: INFERIOR THORACIC SPINE
LOCATION DETAILED: LEFT SUPERIOR MEDIAL MIDBACK
LOCATION DETAILED: MID-FRONTAL SCALP
LOCATION DETAILED: LEFT SUPERIOR UPPER BACK
LOCATION DETAILED: RIGHT PROXIMAL POSTERIOR UPPER ARM

## 2020-11-12 ENCOUNTER — HOSPITAL ENCOUNTER (OUTPATIENT)
Dept: HOSPITAL 8 - CFH | Age: 85
Discharge: HOME | End: 2020-11-12
Attending: PHYSICIAN ASSISTANT
Payer: MEDICARE

## 2020-11-12 DIAGNOSIS — R53.83: ICD-10-CM

## 2020-11-12 DIAGNOSIS — I47.2: ICD-10-CM

## 2020-11-12 DIAGNOSIS — I11.0: Primary | ICD-10-CM

## 2020-11-12 DIAGNOSIS — I34.0: ICD-10-CM

## 2020-11-12 DIAGNOSIS — Z95.810: ICD-10-CM

## 2020-11-12 DIAGNOSIS — I50.23: ICD-10-CM

## 2020-11-12 DIAGNOSIS — I48.0: ICD-10-CM

## 2020-11-12 DIAGNOSIS — I71.4: ICD-10-CM

## 2020-11-12 DIAGNOSIS — I25.5: ICD-10-CM

## 2020-11-12 PROCEDURE — 71046 X-RAY EXAM CHEST 2 VIEWS: CPT

## 2021-01-14 DIAGNOSIS — Z23 NEED FOR VACCINATION: ICD-10-CM

## 2021-06-14 ENCOUNTER — HOSPITAL ENCOUNTER (OUTPATIENT)
Dept: HOSPITAL 8 - LAB | Age: 86
Discharge: HOME | End: 2021-06-14
Attending: NURSE PRACTITIONER
Payer: MEDICARE

## 2021-06-14 DIAGNOSIS — I47.2: ICD-10-CM

## 2021-06-14 DIAGNOSIS — I48.0: ICD-10-CM

## 2021-06-14 DIAGNOSIS — I50.23: ICD-10-CM

## 2021-06-14 DIAGNOSIS — I11.0: Primary | ICD-10-CM

## 2021-06-14 DIAGNOSIS — I34.0: ICD-10-CM

## 2021-06-14 DIAGNOSIS — I25.5: ICD-10-CM

## 2021-06-14 LAB
ALBUMIN SERPL-MCNC: 3.7 G/DL (ref 3.4–5)
ALP SERPL-CCNC: 68 U/L (ref 45–117)
ALT SERPL-CCNC: 57 U/L (ref 12–78)
ANION GAP SERPL CALC-SCNC: 7 MMOL/L (ref 5–15)
BILIRUB SERPL-MCNC: 0.9 MG/DL (ref 0.2–1)
CALCIUM SERPL-MCNC: 8.8 MG/DL (ref 8.5–10.1)
CHLORIDE SERPL-SCNC: 103 MMOL/L (ref 98–107)
CREAT SERPL-MCNC: 0.8 MG/DL (ref 0.7–1.3)
PROT SERPL-MCNC: 7.4 G/DL (ref 6.4–8.2)

## 2021-06-14 PROCEDURE — 36415 COLL VENOUS BLD VENIPUNCTURE: CPT

## 2021-06-14 PROCEDURE — 80162 ASSAY OF DIGOXIN TOTAL: CPT

## 2021-06-14 PROCEDURE — 83880 ASSAY OF NATRIURETIC PEPTIDE: CPT

## 2021-06-14 PROCEDURE — 80053 COMPREHEN METABOLIC PANEL: CPT

## 2021-08-18 PROBLEM — G60.8 PERIPHERAL SENSORY-MOTOR AXONAL POLYNEUROPATHY: Status: ACTIVE | Noted: 2021-08-18

## 2021-08-18 PROBLEM — R42 LIGHTHEADEDNESS: Status: ACTIVE | Noted: 2021-08-18

## 2021-08-18 PROBLEM — R63.0 DECREASED APPETITE: Status: ACTIVE | Noted: 2021-08-18

## 2021-09-08 ENCOUNTER — HOSPITAL ENCOUNTER (OUTPATIENT)
Dept: HOSPITAL 8 - LAB | Age: 86
Discharge: HOME | End: 2021-09-08
Attending: NURSE PRACTITIONER
Payer: MEDICARE

## 2021-09-08 DIAGNOSIS — I47.2: ICD-10-CM

## 2021-09-08 DIAGNOSIS — I71.4: ICD-10-CM

## 2021-09-08 DIAGNOSIS — I48.20: ICD-10-CM

## 2021-09-08 DIAGNOSIS — I25.5: ICD-10-CM

## 2021-09-08 DIAGNOSIS — I11.0: Primary | ICD-10-CM

## 2021-09-08 DIAGNOSIS — Z95.810: ICD-10-CM

## 2021-09-08 DIAGNOSIS — I50.23: ICD-10-CM

## 2021-09-08 DIAGNOSIS — I34.0: ICD-10-CM

## 2021-09-08 DIAGNOSIS — I48.0: ICD-10-CM

## 2021-09-08 DIAGNOSIS — R53.83: ICD-10-CM

## 2021-09-08 LAB
ALBUMIN SERPL-MCNC: 3.6 G/DL (ref 3.4–5)
ALP SERPL-CCNC: 79 U/L (ref 45–117)
ALT SERPL-CCNC: 37 U/L (ref 12–78)
ANION GAP SERPL CALC-SCNC: 7 MMOL/L (ref 5–15)
BILIRUB SERPL-MCNC: 0.7 MG/DL (ref 0.2–1)
CALCIUM SERPL-MCNC: 8.7 MG/DL (ref 8.5–10.1)
CHLORIDE SERPL-SCNC: 106 MMOL/L (ref 98–107)
CREAT SERPL-MCNC: 0.85 MG/DL (ref 0.7–1.3)
PROT SERPL-MCNC: 7.3 G/DL (ref 6.4–8.2)

## 2021-09-08 PROCEDURE — 36415 COLL VENOUS BLD VENIPUNCTURE: CPT

## 2021-09-08 PROCEDURE — 83880 ASSAY OF NATRIURETIC PEPTIDE: CPT

## 2021-09-08 PROCEDURE — 80053 COMPREHEN METABOLIC PANEL: CPT

## 2021-09-09 ENCOUNTER — HOSPITAL ENCOUNTER (OUTPATIENT)
Dept: HOSPITAL 8 - LAB | Age: 86
Discharge: HOME | End: 2021-09-09
Payer: MEDICARE

## 2021-09-09 DIAGNOSIS — I10: Primary | ICD-10-CM

## 2021-09-09 DIAGNOSIS — R53.83: ICD-10-CM

## 2021-09-09 DIAGNOSIS — G47.19: ICD-10-CM

## 2021-09-09 DIAGNOSIS — E78.5: ICD-10-CM

## 2021-12-20 ENCOUNTER — APPOINTMENT (RX ONLY)
Dept: URBAN - METROPOLITAN AREA CLINIC 4 | Facility: CLINIC | Age: 86
Setting detail: DERMATOLOGY
End: 2021-12-20

## 2021-12-20 DIAGNOSIS — D22 MELANOCYTIC NEVI: ICD-10-CM

## 2021-12-20 DIAGNOSIS — L81.4 OTHER MELANIN HYPERPIGMENTATION: ICD-10-CM

## 2021-12-20 DIAGNOSIS — Z85.828 PERSONAL HISTORY OF OTHER MALIGNANT NEOPLASM OF SKIN: ICD-10-CM

## 2021-12-20 DIAGNOSIS — L82.1 OTHER SEBORRHEIC KERATOSIS: ICD-10-CM

## 2021-12-20 DIAGNOSIS — D18.0 HEMANGIOMA: ICD-10-CM

## 2021-12-20 PROBLEM — D18.01 HEMANGIOMA OF SKIN AND SUBCUTANEOUS TISSUE: Status: ACTIVE | Noted: 2021-12-20

## 2021-12-20 PROBLEM — D48.5 NEOPLASM OF UNCERTAIN BEHAVIOR OF SKIN: Status: ACTIVE | Noted: 2021-12-20

## 2021-12-20 PROBLEM — D22.5 MELANOCYTIC NEVI OF TRUNK: Status: ACTIVE | Noted: 2021-12-20

## 2021-12-20 PROCEDURE — 11102 TANGNTL BX SKIN SINGLE LES: CPT

## 2021-12-20 PROCEDURE — ? BIOPSY BY SHAVE METHOD

## 2021-12-20 PROCEDURE — 99213 OFFICE O/P EST LOW 20 MIN: CPT | Mod: 25

## 2021-12-20 ASSESSMENT — LOCATION DETAILED DESCRIPTION DERM
LOCATION DETAILED: LEFT SUPERIOR UPPER BACK
LOCATION DETAILED: RIGHT SUPERIOR MEDIAL MIDBACK
LOCATION DETAILED: MID-FRONTAL SCALP
LOCATION DETAILED: RIGHT PROXIMAL POSTERIOR UPPER ARM
LOCATION DETAILED: LEFT INFERIOR CENTRAL MALAR CHEEK
LOCATION DETAILED: INFERIOR THORACIC SPINE
LOCATION DETAILED: LEFT PROXIMAL POSTERIOR UPPER ARM
LOCATION DETAILED: LEFT SUPERIOR MEDIAL MIDBACK

## 2021-12-20 ASSESSMENT — LOCATION ZONE DERM
LOCATION ZONE: SCALP
LOCATION ZONE: TRUNK
LOCATION ZONE: ARM
LOCATION ZONE: FACE

## 2021-12-20 ASSESSMENT — LOCATION SIMPLE DESCRIPTION DERM
LOCATION SIMPLE: ANTERIOR SCALP
LOCATION SIMPLE: LEFT CHEEK
LOCATION SIMPLE: LEFT UPPER BACK
LOCATION SIMPLE: RIGHT UPPER ARM
LOCATION SIMPLE: RIGHT LOWER BACK
LOCATION SIMPLE: LEFT LOWER BACK
LOCATION SIMPLE: UPPER BACK
LOCATION SIMPLE: LEFT UPPER ARM

## 2021-12-20 NOTE — PROCEDURE: BIOPSY BY SHAVE METHOD
Detail Level: Detailed
Depth Of Biopsy: dermis
Was A Bandage Applied: Yes
Size Of Lesion In Cm: 0
Anticipated Plan (Based On Presumed Biopsy Results): Imiquimod
Biopsy Type: H and E
Biopsy Method: Personna blade
Anesthesia Type: 1% lidocaine with epinephrine and a 1:10 solution of 8.4% sodium bicarbonate
Anesthesia Volume In Cc: 2
Hemostasis: Drysol and Electrocautery
Wound Care: Vaseline
Dressing: Band-Aid
Destruction After The Procedure: No
Type Of Destruction Used: Curettage
Curettage Text: The wound bed was treated with curettage after the biopsy was performed.
Electrodesiccation Text: The wound bed was treated with electrodesiccation after the biopsy was performed.
Electrodesiccation And Curettage Text: The wound bed was treated with electrodesiccation and curettage after the biopsy was performed.
Lab: [id number]
Lab Facility: 
Consent: Verbal consent was obtained and risks were reviewed including but not limited to scarring, infection, bleeding, scabbing, incomplete removal, nerve damage and allergy to anesthesia.
Post-Care Instructions: I reviewed with the patient in detail post-care instructions. Patient is to keep the biopsy site dry overnight, and then apply vasaline twice daily until healed.
Notification Instructions: Patient will be notified of biopsy results. However, patient instructed to call the office if not contacted within 2 weeks.
Billing Type: Third-Party Bill
Information: Selecting Yes will display possible errors in your note based on the variables you have selected. This validation is only offered as a suggestion for you. PLEASE NOTE THAT THE VALIDATION TEXT WILL BE REMOVED WHEN YOU FINALIZE YOUR NOTE. IF YOU WANT TO FAX A PRELIMINARY NOTE YOU WILL NEED TO TOGGLE THIS TO 'NO' IF YOU DO NOT WANT IT IN YOUR FAXED NOTE.

## 2022-01-04 ENCOUNTER — APPOINTMENT (RX ONLY)
Dept: URBAN - METROPOLITAN AREA CLINIC 4 | Facility: CLINIC | Age: 87
Setting detail: DERMATOLOGY
End: 2022-01-04

## 2022-01-04 DIAGNOSIS — L57.0 ACTINIC KERATOSIS: ICD-10-CM | Status: INADEQUATELY CONTROLLED

## 2022-01-04 PROCEDURE — 99213 OFFICE O/P EST LOW 20 MIN: CPT

## 2022-01-04 PROCEDURE — ? COUNSELING

## 2022-01-04 PROCEDURE — ? PRESCRIPTION

## 2022-01-04 RX ORDER — IMIQUIMOD 12.5 MG/.25G
1 CREAM TOPICAL DAILY
Qty: 12 | Refills: 1 | Status: ERX | COMMUNITY
Start: 2022-01-04

## 2022-01-04 RX ADMIN — IMIQUIMOD 1: 12.5 CREAM TOPICAL at 00:00

## 2022-01-04 ASSESSMENT — LOCATION DETAILED DESCRIPTION DERM: LOCATION DETAILED: RIGHT CENTRAL POSTAURICULAR SKIN

## 2022-01-04 ASSESSMENT — LOCATION SIMPLE DESCRIPTION DERM: LOCATION SIMPLE: SCALP

## 2022-01-04 ASSESSMENT — LOCATION ZONE DERM: LOCATION ZONE: SCALP
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
## 2022-02-22 PROBLEM — K59.00 CONSTIPATION: Status: ACTIVE | Noted: 2022-02-22

## 2022-04-05 ENCOUNTER — APPOINTMENT (RX ONLY)
Dept: URBAN - METROPOLITAN AREA CLINIC 4 | Facility: CLINIC | Age: 87
Setting detail: DERMATOLOGY
End: 2022-04-05

## 2022-04-05 DIAGNOSIS — L82.0 INFLAMED SEBORRHEIC KERATOSIS: ICD-10-CM

## 2022-04-05 DIAGNOSIS — L82.1 OTHER SEBORRHEIC KERATOSIS: ICD-10-CM

## 2022-04-05 DIAGNOSIS — Z09 ENCOUNTER FOR FOLLOW-UP EXAMINATION AFTER COMPLETED TREATMENT FOR CONDITIONS OTHER THAN MALIGNANT NEOPLASM: ICD-10-CM | Status: RESOLVED

## 2022-04-05 PROCEDURE — 99212 OFFICE O/P EST SF 10 MIN: CPT

## 2022-04-05 PROCEDURE — ? OBSERVATION

## 2022-04-05 PROCEDURE — ? LIQUID NITROGEN (COSMETIC)

## 2022-04-05 ASSESSMENT — LOCATION SIMPLE DESCRIPTION DERM
LOCATION SIMPLE: SCALP
LOCATION SIMPLE: LEFT CHEEK
LOCATION SIMPLE: LEFT ANTERIOR NECK

## 2022-04-05 ASSESSMENT — LOCATION ZONE DERM
LOCATION ZONE: FACE
LOCATION ZONE: SCALP
LOCATION ZONE: NECK

## 2022-04-05 ASSESSMENT — LOCATION DETAILED DESCRIPTION DERM
LOCATION DETAILED: LEFT CLAVICULAR NECK
LOCATION DETAILED: RIGHT CENTRAL POSTAURICULAR SKIN
LOCATION DETAILED: LEFT CENTRAL MALAR CHEEK
LOCATION DETAILED: LEFT SUPERIOR ANTERIOR NECK

## 2022-04-05 NOTE — PROCEDURE: LIQUID NITROGEN (COSMETIC)
Render Post-Care Instructions In Note?: no
Detail Level: Detailed
Billing Information: Bill by Static Price
Price (Use Numbers Only, No Special Characters Or $): 0
Show Spray Paint Technique Variable?: Yes
Spray Paint Text: The liquid nitrogen was applied to the skin utilizing a spray paint frosting technique.
Consent: The patient's consent was obtained including but not limited to risks of crusting, scabbing, blistering, scarring, darker or lighter pigmentary change, recurrence, incomplete removal and infection. The patient understands that the procedure is cosmetic in nature and is not covered by insurance.
Post-Care Instructions: I reviewed with the patient in detail post-care instructions. Patient is to wear sunprotection, and avoid picking at any of the treated lesions. Pt may apply Vaseline to crusted or scabbing areas.

## 2022-04-22 PROBLEM — G47.34 NOCTURNAL HYPOXEMIA: Status: ACTIVE | Noted: 2022-04-22

## 2022-10-07 PROBLEM — N28.1 ACQUIRED CYSTIC KIDNEY DISEASE: Status: ACTIVE | Noted: 2022-10-07

## 2022-10-07 PROBLEM — I50.9 CHF (CONGESTIVE HEART FAILURE) (HCC): Status: ACTIVE | Noted: 2019-05-16

## 2022-10-07 PROBLEM — D68.59 HYPERCOAGULABLE STATE (HCC): Status: ACTIVE | Noted: 2019-05-16

## 2022-10-07 PROBLEM — Z95.810 PRESENCE OF AUTOMATIC (IMPLANTABLE) CARDIAC DEFIBRILLATOR: Status: ACTIVE | Noted: 2021-11-04

## 2022-10-07 PROBLEM — R32 URINARY INCONTINENCE: Status: ACTIVE | Noted: 2020-12-02

## 2022-10-07 PROBLEM — I50.23 ACUTE ON CHRONIC SYSTOLIC CHF (CONGESTIVE HEART FAILURE) (HCC): Status: ACTIVE | Noted: 2019-05-16

## 2022-10-07 PROBLEM — I48.0 AF (PAROXYSMAL ATRIAL FIBRILLATION) (HCC): Status: ACTIVE | Noted: 2017-08-28

## 2022-11-10 PROBLEM — F32.9 MAJOR DEPRESSIVE DISORDER: Status: ACTIVE | Noted: 2022-11-10

## 2022-11-28 ENCOUNTER — APPOINTMENT (RX ONLY)
Dept: URBAN - METROPOLITAN AREA CLINIC 4 | Facility: CLINIC | Age: 87
Setting detail: DERMATOLOGY
End: 2022-11-28

## 2022-11-28 DIAGNOSIS — L82.1 OTHER SEBORRHEIC KERATOSIS: ICD-10-CM

## 2022-11-28 DIAGNOSIS — L82.0 INFLAMED SEBORRHEIC KERATOSIS: ICD-10-CM

## 2022-11-28 DIAGNOSIS — L57.0 ACTINIC KERATOSIS: ICD-10-CM | Status: WELL CONTROLLED

## 2022-11-28 DIAGNOSIS — L85.3 XEROSIS CUTIS: ICD-10-CM

## 2022-11-28 PROCEDURE — 17000 DESTRUCT PREMALG LESION: CPT

## 2022-11-28 PROCEDURE — ? COUNSELING

## 2022-11-28 PROCEDURE — 99213 OFFICE O/P EST LOW 20 MIN: CPT | Mod: 25

## 2022-11-28 PROCEDURE — ? LIQUID NITROGEN

## 2022-11-28 PROCEDURE — ? LIQUID NITROGEN (COSMETIC)

## 2022-11-28 PROCEDURE — ? OBSERVATION

## 2022-11-28 ASSESSMENT — LOCATION ZONE DERM
LOCATION ZONE: NECK
LOCATION ZONE: EAR
LOCATION ZONE: TRUNK
LOCATION ZONE: FACE
LOCATION ZONE: SCALP

## 2022-11-28 ASSESSMENT — LOCATION DETAILED DESCRIPTION DERM
LOCATION DETAILED: RIGHT SUPERIOR CENTRAL MALAR CHEEK
LOCATION DETAILED: LEFT CENTRAL MALAR CHEEK
LOCATION DETAILED: LEFT SUPERIOR ANTERIOR NECK
LOCATION DETAILED: LEFT SUPERIOR CRUS OF ANTIHELIX
LOCATION DETAILED: RIGHT CYMBA CONCHA
LOCATION DETAILED: RIGHT CENTRAL POSTAURICULAR SKIN
LOCATION DETAILED: RIGHT LATERAL MALAR CHEEK
LOCATION DETAILED: SUPERIOR THORACIC SPINE
LOCATION DETAILED: LEFT LATERAL INFERIOR CHEST

## 2022-11-28 ASSESSMENT — LOCATION SIMPLE DESCRIPTION DERM
LOCATION SIMPLE: SCALP
LOCATION SIMPLE: RIGHT CHEEK
LOCATION SIMPLE: LEFT EAR
LOCATION SIMPLE: LEFT ANTERIOR NECK
LOCATION SIMPLE: UPPER BACK
LOCATION SIMPLE: RIGHT EAR
LOCATION SIMPLE: CHEST
LOCATION SIMPLE: LEFT CHEEK

## 2022-11-28 NOTE — PROCEDURE: LIQUID NITROGEN (COSMETIC)
Price (Use Numbers Only, No Special Characters Or $): 0
Render Post-Care Instructions In Note?: no
Show Spray Paint Technique Variable?: Yes
Detail Level: Detailed
Consent: The patient's consent was obtained including but not limited to risks of crusting, scabbing, blistering, scarring, darker or lighter pigmentary change, recurrence, incomplete removal and infection. The patient understands that the procedure is cosmetic in nature and is not covered by insurance.
Spray Paint Text: The liquid nitrogen was applied to the skin utilizing a spray paint frosting technique.
Billing Information: Bill by Static Price
Post-Care Instructions: I reviewed with the patient in detail post-care instructions. Patient is to wear sunprotection, and avoid picking at any of the treated lesions. Pt may apply Vaseline to crusted or scabbing areas.

## 2023-01-12 PROBLEM — I95.1 ORTHOSTATIC HYPOTENSION: Status: ACTIVE | Noted: 2023-01-12

## 2023-01-12 PROBLEM — R15.9 STOOL INCONTINENCE: Status: ACTIVE | Noted: 2023-01-12

## 2023-01-16 ENCOUNTER — HOSPITAL ENCOUNTER (OUTPATIENT)
Facility: MEDICAL CENTER | Age: 88
End: 2023-01-16
Payer: MEDICARE

## 2023-01-16 DIAGNOSIS — I95.1 ORTHOSTATIC HYPOTENSION: ICD-10-CM

## 2023-01-16 DIAGNOSIS — D75.89 MACROCYTOSIS: ICD-10-CM

## 2023-01-16 DIAGNOSIS — E55.9 VITAMIN D DEFICIENCY: ICD-10-CM

## 2023-01-16 LAB
25(OH)D3 SERPL-MCNC: 40 NG/ML (ref 30–100)
ANION GAP SERPL CALC-SCNC: 11 MMOL/L (ref 7–16)
BASOPHILS # BLD AUTO: 1.1 % (ref 0–1.8)
BASOPHILS # BLD: 0.09 K/UL (ref 0–0.12)
BUN SERPL-MCNC: 10 MG/DL (ref 8–22)
CALCIUM SERPL-MCNC: 9.9 MG/DL (ref 8.5–10.5)
CHLORIDE SERPL-SCNC: 102 MMOL/L (ref 96–112)
CO2 SERPL-SCNC: 27 MMOL/L (ref 20–33)
CREAT SERPL-MCNC: 0.74 MG/DL (ref 0.5–1.4)
EOSINOPHIL # BLD AUTO: 0.25 K/UL (ref 0–0.51)
EOSINOPHIL NFR BLD: 3 % (ref 0–6.9)
ERYTHROCYTE [DISTWIDTH] IN BLOOD BY AUTOMATED COUNT: 53.4 FL (ref 35.9–50)
FOLATE SERPL-MCNC: 17.5 NG/ML
GFR SERPLBLD CREATININE-BSD FMLA CKD-EPI: 87 ML/MIN/1.73 M 2
GLUCOSE SERPL-MCNC: 103 MG/DL (ref 65–99)
HCT VFR BLD AUTO: 41.5 % (ref 42–52)
HGB BLD-MCNC: 13.5 G/DL (ref 14–18)
IMM GRANULOCYTES # BLD AUTO: 0.03 K/UL (ref 0–0.11)
IMM GRANULOCYTES NFR BLD AUTO: 0.4 % (ref 0–0.9)
LYMPHOCYTES # BLD AUTO: 2.83 K/UL (ref 1–4.8)
LYMPHOCYTES NFR BLD: 33.8 % (ref 22–41)
MCH RBC QN AUTO: 32.8 PG (ref 27–33)
MCHC RBC AUTO-ENTMCNC: 32.5 G/DL (ref 33.7–35.3)
MCV RBC AUTO: 101 FL (ref 81.4–97.8)
MONOCYTES # BLD AUTO: 0.79 K/UL (ref 0–0.85)
MONOCYTES NFR BLD AUTO: 9.4 % (ref 0–13.4)
NEUTROPHILS # BLD AUTO: 4.39 K/UL (ref 1.82–7.42)
NEUTROPHILS NFR BLD: 52.3 % (ref 44–72)
NRBC # BLD AUTO: 0 K/UL
NRBC BLD-RTO: 0 /100 WBC
PLATELET # BLD AUTO: 230 K/UL (ref 164–446)
PMV BLD AUTO: 10.6 FL (ref 9–12.9)
POTASSIUM SERPL-SCNC: 4.7 MMOL/L (ref 3.6–5.5)
RBC # BLD AUTO: 4.11 M/UL (ref 4.7–6.1)
SODIUM SERPL-SCNC: 140 MMOL/L (ref 135–145)
VIT B12 SERPL-MCNC: 779 PG/ML (ref 211–911)
WBC # BLD AUTO: 8.4 K/UL (ref 4.8–10.8)

## 2023-01-16 PROCEDURE — 85025 COMPLETE CBC W/AUTO DIFF WBC: CPT

## 2023-01-16 PROCEDURE — 82607 VITAMIN B-12: CPT

## 2023-01-16 PROCEDURE — 80048 BASIC METABOLIC PNL TOTAL CA: CPT

## 2023-01-16 PROCEDURE — 82746 ASSAY OF FOLIC ACID SERUM: CPT

## 2023-01-16 PROCEDURE — 82306 VITAMIN D 25 HYDROXY: CPT

## 2023-01-24 RX ORDER — FUROSEMIDE 40 MG/1
20 TABLET ORAL DAILY
Qty: 30 TABLET | Refills: 0 | Status: SHIPPED | OUTPATIENT
Start: 2023-01-24 | End: 2023-07-13 | Stop reason: SINTOL

## 2023-02-23 PROBLEM — R42 LIGHTHEADEDNESS: Status: RESOLVED | Noted: 2021-08-18 | Resolved: 2023-02-23
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
## 2023-02-23 PROBLEM — R20.0 ARM NUMBNESS: Status: ACTIVE | Noted: 2023-02-23

## 2023-07-10 ENCOUNTER — APPOINTMENT (RX ONLY)
Dept: URBAN - METROPOLITAN AREA CLINIC 4 | Facility: CLINIC | Age: 88
Setting detail: DERMATOLOGY
End: 2023-07-10

## 2023-07-10 DIAGNOSIS — L82.1 OTHER SEBORRHEIC KERATOSIS: ICD-10-CM

## 2023-07-10 DIAGNOSIS — L85.3 XEROSIS CUTIS: ICD-10-CM

## 2023-07-10 DIAGNOSIS — L21.8 OTHER SEBORRHEIC DERMATITIS: ICD-10-CM

## 2023-07-10 DIAGNOSIS — L57.0 ACTINIC KERATOSIS: ICD-10-CM

## 2023-07-10 PROBLEM — L30.9 DERMATITIS, UNSPECIFIED: Status: ACTIVE | Noted: 2023-07-10

## 2023-07-10 PROBLEM — D48.5 NEOPLASM OF UNCERTAIN BEHAVIOR OF SKIN: Status: ACTIVE | Noted: 2023-07-10

## 2023-07-10 PROCEDURE — ? ADDITIONAL NOTES

## 2023-07-10 PROCEDURE — ? DEFER

## 2023-07-10 PROCEDURE — ? COUNSELING

## 2023-07-10 PROCEDURE — 17003 DESTRUCT PREMALG LES 2-14: CPT

## 2023-07-10 PROCEDURE — ? MEDICATION COUNSELING

## 2023-07-10 PROCEDURE — ? LIQUID NITROGEN

## 2023-07-10 PROCEDURE — ? PRESCRIPTION

## 2023-07-10 PROCEDURE — 17000 DESTRUCT PREMALG LESION: CPT

## 2023-07-10 PROCEDURE — 99213 OFFICE O/P EST LOW 20 MIN: CPT | Mod: 25

## 2023-07-10 RX ORDER — AMMONIUM LACTATE 12 G/100G
CREAM TOPICAL BID
Qty: 140 | Refills: 1 | Status: ERX | COMMUNITY
Start: 2023-07-10

## 2023-07-10 RX ORDER — HYDROCORTISONE 25 MG/G
CREAM TOPICAL QPM
Qty: 20 | Refills: 0 | Status: ERX | COMMUNITY
Start: 2023-07-10

## 2023-07-10 RX ORDER — KETOCONAZOLE 20 MG/G
CREAM TOPICAL BID
Qty: 30 | Refills: 0 | Status: ERX | COMMUNITY
Start: 2023-07-10

## 2023-07-10 RX ADMIN — KETOCONAZOLE 1: 20 CREAM TOPICAL at 00:00

## 2023-07-10 RX ADMIN — HYDROCORTISONE 1: 25 CREAM TOPICAL at 00:00

## 2023-07-10 RX ADMIN — AMMONIUM LACTATE 1: 12 CREAM TOPICAL at 00:00

## 2023-07-10 ASSESSMENT — LOCATION DETAILED DESCRIPTION DERM
LOCATION DETAILED: RIGHT PROXIMAL DORSAL FOREARM
LOCATION DETAILED: RIGHT DISTAL DORSAL FOREARM
LOCATION DETAILED: LEFT CENTRAL MALAR CHEEK
LOCATION DETAILED: LEFT DISTAL PRETIBIAL REGION
LOCATION DETAILED: RIGHT DISTAL PRETIBIAL REGION
LOCATION DETAILED: RIGHT DISTAL POSTERIOR UPPER ARM
LOCATION DETAILED: RIGHT INFERIOR CENTRAL MALAR CHEEK
LOCATION DETAILED: RIGHT CENTRAL MALAR CHEEK
LOCATION DETAILED: LEFT MEDIAL UPPER BACK
LOCATION DETAILED: LEFT INFERIOR CENTRAL MALAR CHEEK
LOCATION DETAILED: RIGHT DISTAL RADIAL DORSAL FOREARM
LOCATION DETAILED: LEFT PROXIMAL POSTERIOR UPPER ARM

## 2023-07-10 ASSESSMENT — LOCATION ZONE DERM
LOCATION ZONE: FACE
LOCATION ZONE: TRUNK
LOCATION ZONE: ARM
LOCATION ZONE: LEG

## 2023-07-10 ASSESSMENT — LOCATION SIMPLE DESCRIPTION DERM
LOCATION SIMPLE: LEFT PRETIBIAL REGION
LOCATION SIMPLE: LEFT UPPER BACK
LOCATION SIMPLE: RIGHT FOREARM
LOCATION SIMPLE: RIGHT PRETIBIAL REGION
LOCATION SIMPLE: LEFT CHEEK
LOCATION SIMPLE: LEFT UPPER ARM
LOCATION SIMPLE: RIGHT UPPER ARM
LOCATION SIMPLE: RIGHT CHEEK

## 2023-07-10 NOTE — PROCEDURE: LIQUID NITROGEN
Consent: The patient's consent was obtained including but not limited to risks of crusting, scabbing, blistering, scarring, darker or lighter pigmentary change, recurrence, incomplete removal and infection.
Show Aperture Variable?: Yes
Aperture Size (Optional): C
Application Tool (Optional): Cry-AC
Render Note In Bullet Format When Appropriate: No
Duration Of Freeze Thaw-Cycle (Seconds): 3
Number Of Freeze-Thaw Cycles: 1 freeze-thaw cycle
Detail Level: Detailed
Post-Care Instructions: I reviewed with the patient in detail post-care instructions. Patient is to wear sunprotection, and avoid picking at any of the treated lesions. Pt may apply Vaseline to crusted or scabbing areas.

## 2023-07-10 NOTE — PROCEDURE: MEDICATION COUNSELING
Calcipotriene Counseling:  I discussed with the patient the risks of calcipotriene including but not limited to erythema, scaling, itching, and irritation.
Cyclosporine Counseling:  I discussed with the patient the risks of cyclosporine including but not limited to hypertension, gingival hyperplasia,myelosuppression, immunosuppression, liver damage, kidney damage, neurotoxicity, lymphoma, and serious infections. The patient understands that monitoring is required including baseline blood pressure, CBC, CMP, lipid panel and uric acid, and then 1-2 times monthly CMP and blood pressure.
Taltz Counseling: I discussed with the patient the risks of ixekizumab including but not limited to immunosuppression, serious infections, worsening of inflammatory bowel disease and drug reactions.  The patient understands that monitoring is required including a PPD at baseline and must alert us or the primary physician if symptoms of infection or other concerning signs are noted.
Tazorac Pregnancy And Lactation Text: This medication is not safe during pregnancy. It is unknown if this medication is excreted in breast milk.
Olumiant Pregnancy And Lactation Text: Based on animal studies, Olumiant may cause embryo-fetal harm when administered to pregnant women.  The medication should not be used in pregnancy.  Breastfeeding is not recommended during treatment.
Aklief Pregnancy And Lactation Text: It is unknown if this medication is safe to use during pregnancy.  It is unknown if this medication is excreted in breast milk.  Breastfeeding women should use the topical cream on the smallest area of the skin for the shortest time needed while breastfeeding.  Do not apply to nipple and areola.
Birth Control Pills Pregnancy And Lactation Text: This medication should be avoided if pregnant and for the first 30 days post-partum.
Low Dose Naltrexone Pregnancy And Lactation Text: Naltrexone is pregnancy category C.  There have been no adequate and well-controlled studies in pregnant women.  It should be used in pregnancy only if the potential benefit justifies the potential risk to the fetus.   Limited data indicates that naltrexone is minimally excreted into breastmilk.
Cosentyx Pregnancy And Lactation Text: This medication is Pregnancy Category B and is considered safe during pregnancy. It is unknown if this medication is excreted in breast milk.
Topical Sulfur Applications Counseling: Topical Sulfur Counseling: Patient counseled that this medication may cause skin irritation or allergic reactions.  In the event of skin irritation, the patient was advised to reduce the amount of the drug applied or use it less frequently.   The patient verbalized understanding of the proper use and possible adverse effects of topical sulfur application.  All of the patient's questions and concerns were addressed.
Eucrisa Pregnancy And Lactation Text: This medication has not been assigned a Pregnancy Risk Category but animal studies failed to show danger with the topical medication. It is unknown if the medication is excreted in breast milk.
Cephalexin Pregnancy And Lactation Text: This medication is Pregnancy Category B and considered safe during pregnancy.  It is also excreted in breast milk but can be used safely for shorter doses.
Infliximab Counseling:  I discussed with the patient the risks of infliximab including but not limited to myelosuppression, immunosuppression, autoimmune hepatitis, demyelinating diseases, lymphoma, and serious infections.  The patient understands that monitoring is required including a PPD at baseline and must alert us or the primary physician if symptoms of infection or other concerning signs are noted.
Terbinafine Counseling: Patient counseling regarding adverse effects of terbinafine including but not limited to headache, diarrhea, rash, upset stomach, liver function test abnormalities, itching, taste/smell disturbance, nausea, abdominal pain, and flatulence.  There is a rare possibility of liver failure that can occur when taking terbinafine.  The patient understands that a baseline LFT and kidney function test may be required. The patient verbalized understanding of the proper use and possible adverse effects of terbinafine.  All of the patient's questions and concerns were addressed.
Dapsone Counseling: I discussed with the patient the risks of dapsone including but not limited to hemolytic anemia, agranulocytosis, rashes, methemoglobinemia, kidney failure, peripheral neuropathy, headaches, GI upset, and liver toxicity.  Patients who start dapsone require monitoring including baseline LFTs and weekly CBCs for the first month, then every month thereafter.  The patient verbalized understanding of the proper use and possible adverse effects of dapsone.  All of the patient's questions and concerns were addressed.
Minocycline Pregnancy And Lactation Text: This medication is Pregnancy Category D and not consider safe during pregnancy. It is also excreted in breast milk.
Niacinamide Counseling: I recommended taking niacin or niacinamide, also know as vitamin B3, twice daily. Recent evidence suggests that taking vitamin B3 (500 mg twice daily) can reduce the risk of actinic keratoses and non-melanoma skin cancers. Side effects of vitamin B3 include flushing and headache.
Calcipotriene Pregnancy And Lactation Text: The use of this medication during pregnancy or lactation is not recommended as there is insufficient data.
Terbinafine Pregnancy And Lactation Text: This medication is Pregnancy Category B and is considered safe during pregnancy. It is also excreted in breast milk and breast feeding isn't recommended.
Albendazole Pregnancy And Lactation Text: This medication is Pregnancy Category C and it isn't known if it is safe during pregnancy. It is also excreted in breast milk.
Azelaic Acid Counseling: Patient counseled that medicine may cause skin irritation and to avoid applying near the eyes.  In the event of skin irritation, the patient was advised to reduce the amount of the drug applied or use it less frequently.   The patient verbalized understanding of the proper use and possible adverse effects of azelaic acid.  All of the patient's questions and concerns were addressed.
Spironolactone Counseling: Patient advised regarding risks of diarrhea, abdominal pain, hyperkalemia, birth defects (for female patients), liver toxicity and renal toxicity. The patient may need blood work to monitor liver and kidney function and potassium levels while on therapy. The patient verbalized understanding of the proper use and possible adverse effects of spironolactone.  All of the patient's questions and concerns were addressed.
Zyclara Counseling:  I discussed with the patient the risks of imiquimod including but not limited to erythema, scaling, itching, weeping, crusting, and pain.  Patient understands that the inflammatory response to imiquimod is variable from person to person and was educated regarded proper titration schedule.  If flu-like symptoms develop, patient knows to discontinue the medication and contact us.
Tranexamic Acid Counseling:  Patient advised of the small risk of bleeding problems with tranexamic acid. They were also instructed to call if they developed any nausea, vomiting or diarrhea. All of the patient's questions and concerns were addressed.
Rhofade Counseling: Rhofade is a topical medication which can decrease superficial blood flow where applied. Side effects are uncommon and include stinging, redness and allergic reactions.
Otezla Pregnancy And Lactation Text: This medication is Pregnancy Category C and it isn't known if it is safe during pregnancy. It is unknown if it is excreted in breast milk.
Mirvaso Pregnancy And Lactation Text: This medication has not been assigned a Pregnancy Risk Category. It is unknown if the medication is excreted in breast milk.
Libtayo Pregnancy And Lactation Text: This medication is contraindicated in pregnancy and when breast feeding.
Bexarotene Counseling:  I discussed with the patient the risks of bexarotene including but not limited to hair loss, dry lips/skin/eyes, liver abnormalities, hyperlipidemia, pancreatitis, depression/suicidal ideation, photosensitivity, drug rash/allergic reactions, hypothyroidism, anemia, leukopenia, infection, cataracts, and teratogenicity.  Patient understands that they will need regular blood tests to check lipid profile, liver function tests, white blood cell count, thyroid function tests and pregnancy test if applicable.
Rinvoq Counseling: I discussed with the patient the risks of Rinvoq therapy including but not limited to upper respiratory tract infections, shingles, cold sores, bronchitis, nausea, cough, fever, acne, and headache. Live vaccines should be avoided.  This medication has been linked to serious infections; higher rate of mortality; malignancy and lymphoproliferative disorders; major adverse cardiovascular events; thrombosis; thrombocytopenia, anemia, and neutropenia; lipid elevations; liver enzyme elevations; and gastrointestinal perforations.
Odomzo Counseling- I discussed with the patient the risks of Odomzo including but not limited to nausea, vomiting, diarrhea, constipation, weight loss, changes in the sense of taste, decreased appetite, muscle spasms, and hair loss.  The patient verbalized understanding of the proper use and possible adverse effects of Odomzo.  All of the patient's questions and concerns were addressed.
Tranexamic Acid Pregnancy And Lactation Text: It is unknown if this medication is safe during pregnancy or breast feeding.
Fluconazole Counseling:  Patient counseled regarding adverse effects of fluconazole including but not limited to headache, diarrhea, nausea, upset stomach, liver function test abnormalities, taste disturbance, and stomach pain.  There is a rare possibility of liver failure that can occur when taking fluconazole.  The patient understands that monitoring of LFTs and kidney function test may be required, especially at baseline. The patient verbalized understanding of the proper use and possible adverse effects of fluconazole.  All of the patient's questions and concerns were addressed.
Topical Clindamycin Counseling: Patient counseled that this medication may cause skin irritation or allergic reactions.  In the event of skin irritation, the patient was advised to reduce the amount of the drug applied or use it less frequently.   The patient verbalized understanding of the proper use and possible adverse effects of clindamycin.  All of the patient's questions and concerns were addressed.
Hydroquinone Counseling:  Patient advised that medication may result in skin irritation, lightening (hypopigmentation), dryness, and burning.  In the event of skin irritation, the patient was advised to reduce the amount of the drug applied or use it less frequently.  Rarely, spots that are treated with hydroquinone can become darker (pseudoochronosis).  Should this occur, patient instructed to stop medication and call the office. The patient verbalized understanding of the proper use and possible adverse effects of hydroquinone.  All of the patient's questions and concerns were addressed.
Clindamycin Counseling: I counseled the patient regarding use of clindamycin as an antibiotic for prophylactic and/or therapeutic purposes. Clindamycin is active against numerous classes of bacteria, including skin bacteria. Side effects may include nausea, diarrhea, gastrointestinal upset, rash, hives, yeast infections, and in rare cases, colitis.
Cantharidin Counseling:  I discussed with the patient the risks of Cantharidin including but not limited to pain, redness, burning, itching, and blistering.
Topical Sulfur Applications Pregnancy And Lactation Text: This medication is considered safe during pregnancy and breast feeding secondary to limited systemic absorption.
Dupixent Counseling: I discussed with the patient the risks of dupilumab including but not limited to eye infection and irritation, cold sores, injection site reactions, worsening of asthma, allergic reactions and increased risk of parasitic infection.  Live vaccines should be avoided while taking dupilumab. Dupilumab will also interact with certain medications such as warfarin and cyclosporine. The patient understands that monitoring is required and they must alert us or the primary physician if symptoms of infection or other concerning signs are noted.
Taltz Pregnancy And Lactation Text: The risk during pregnancy and breastfeeding is uncertain with this medication.
Dapsone Pregnancy And Lactation Text: This medication is Pregnancy Category C and is not considered safe during pregnancy or breast feeding.
Cyclosporine Pregnancy And Lactation Text: This medication is Pregnancy Category C and it isn't know if it is safe during pregnancy. This medication is excreted in breast milk.
Clindamycin Pregnancy And Lactation Text: This medication can be used in pregnancy if certain situations. Clindamycin is also present in breast milk.
Dupixent Pregnancy And Lactation Text: This medication likely crosses the placenta but the risk for the fetus is uncertain. This medication is excreted in breast milk.
Azithromycin Counseling:  I discussed with the patient the risks of azithromycin including but not limited to GI upset, allergic reaction, drug rash, diarrhea, and yeast infections.
Spironolactone Pregnancy And Lactation Text: This medication can cause feminization of the male fetus and should be avoided during pregnancy. The active metabolite is also found in breast milk.
Fluconazole Pregnancy And Lactation Text: This medication is Pregnancy Category C and it isn't know if it is safe during pregnancy. It is also excreted in breast milk.
Azelaic Acid Pregnancy And Lactation Text: This medication is considered safe during pregnancy and breast feeding.
Ivermectin Counseling:  Patient instructed to take medication on an empty stomach with a full glass of water.  Patient informed of potential adverse effects including but not limited to nausea, diarrhea, dizziness, itching, and swelling of the extremities or lymph nodes.  The patient verbalized understanding of the proper use and possible adverse effects of ivermectin.  All of the patient's questions and concerns were addressed.
Wartpeel Counseling:  I discussed with the patient the risks of Wartpeel including but not limited to erythema, scaling, itching, weeping, crusting, and pain.
Cantharidin Pregnancy And Lactation Text: This medication has not been proven safe during pregnancy. It is unknown if this medication is excreted in breast milk.
Bexarotene Pregnancy And Lactation Text: This medication is Pregnancy Category X and should not be given to women who are pregnant or may become pregnant. This medication should not be used if you are breast feeding.
Rituxan Counseling:  I discussed with the patient the risks of Rituxan infusions. Side effects can include infusion reactions, severe drug rashes including mucocutaneous reactions, reactivation of latent hepatitis and other infections and rarely progressive multifocal leukoencephalopathy.  All of the patient's questions and concerns were addressed.
Oxybutynin Counseling:  I discussed with the patient the risks of oxybutynin including but not limited to skin rash, drowsiness, dry mouth, difficulty urinating, and blurred vision.
Niacinamide Pregnancy And Lactation Text: These medications are considered safe during pregnancy.
Zyclara Pregnancy And Lactation Text: This medication is Pregnancy Category C. It is unknown if this medication is excreted in breast milk.
Opzelura Counseling:  I discussed with the patient the risks of Opzelura including but not limited to nasopharngitis, bronchitis, ear infection, eosinophila, hives, diarrhea, folliculitis, tonsillitis, and rhinorrhea.  Taken orally, this medication has been linked to serious infections; higher rate of mortality; malignancy and lymphoproliferative disorders; major adverse cardiovascular events; thrombosis; thrombocytopenia, anemia, and neutropenia; and lipid elevations.
Quinolones Counseling:  I discussed with the patient the risks of fluoroquinolones including but not limited to GI upset, allergic reaction, drug rash, diarrhea, dizziness, photosensitivity, yeast infections, liver function test abnormalities, tendonitis/tendon rupture.
Solaraze Counseling:  I discussed with the patient the risks of Solaraze including but not limited to erythema, scaling, itching, weeping, crusting, and pain.
Odomzo Pregnancy And Lactation Text: This medication is Pregnancy Category X and is absolutely contraindicated during pregnancy. It is unknown if it is excreted in breast milk.
Cimetidine Counseling:  I discussed with the patient the risks of Cimetidine including but not limited to gynecomastia, headache, diarrhea, nausea, drowsiness, arrhythmias, pancreatitis, skin rashes, psychosis, bone marrow suppression and kidney toxicity.
Oxybutynin Pregnancy And Lactation Text: This medication is Pregnancy Category B and is considered safe during pregnancy. It is unknown if it is excreted in breast milk.
Rinvoq Pregnancy And Lactation Text: Based on animal studies, Rinvoq may cause embryo-fetal harm when administered to pregnant women.  The medication should not be used in pregnancy.  Breastfeeding is not recommended during treatment and for 6 days after the last dose.
Gabapentin Counseling: I discussed with the patient the risks of gabapentin including but not limited to dizziness, somnolence, fatigue and ataxia.
Methotrexate Counseling:  Patient counseled regarding adverse effects of methotrexate including but not limited to nausea, vomiting, abnormalities in liver function tests. Patients may develop mouth sores, rash, diarrhea, and abnormalities in blood counts. The patient understands that monitoring is required including LFT's and blood counts.  There is a rare possibility of scarring of the liver and lung problems that can occur when taking methotrexate. Persistent nausea, loss of appetite, pale stools, dark urine, cough, and shortness of breath should be reported immediately. Patient advised to discontinue methotrexate treatment at least three months before attempting to become pregnant.  I discussed the need for folate supplements while taking methotrexate.  These supplements can decrease side effects during methotrexate treatment. The patient verbalized understanding of the proper use and possible adverse effects of methotrexate.  All of the patient's questions and concerns were addressed.
Tremfya Counseling: I discussed with the patient the risks of guselkumab including but not limited to immunosuppression, serious infections, and drug reactions.  The patient understands that monitoring is required including a PPD at baseline and must alert us or the primary physician if symptoms of infection or other concerning signs are noted.
5-Fu Counseling: 5-Fluorouracil Counseling:  I discussed with the patient the risks of 5-fluorouracil including but not limited to erythema, scaling, itching, weeping, crusting, and pain.
Valtrex Counseling: I discussed with the patient the risks of valacyclovir including but not limited to kidney damage, nausea, vomiting and severe allergy.  The patient understands that if the infection seems to be worsening or is not improving, they are to call.
Methotrexate Pregnancy And Lactation Text: This medication is Pregnancy Category X and is known to cause fetal harm. This medication is excreted in breast milk.
5-Fu Pregnancy And Lactation Text: This medication is Pregnancy Category X and contraindicated in pregnancy and in women who may become pregnant. It is unknown if this medication is excreted in breast milk.
Enbrel Counseling:  I discussed with the patient the risks of etanercept including but not limited to myelosuppression, immunosuppression, autoimmune hepatitis, demyelinating diseases, lymphoma, and infections.  The patient understands that monitoring is required including a PPD at baseline and must alert us or the primary physician if symptoms of infection or other concerning signs are noted.
Gabapentin Pregnancy And Lactation Text: This medication is Pregnancy Category C and isn't considered safe during pregnancy. It is excreted in breast milk.
Benzoyl Peroxide Counseling: Patient counseled that medicine may cause skin irritation and bleach clothing.  In the event of skin irritation, the patient was advised to reduce the amount of the drug applied or use it less frequently.   The patient verbalized understanding of the proper use and possible adverse effects of benzoyl peroxide.  All of the patient's questions and concerns were addressed.
Opioid Counseling: I discussed with the patient the potential side effects of opioids including but not limited to addiction, altered mental status, and depression. I stressed avoiding alcohol, benzodiazepines, muscle relaxants and sleep aids unless specifically okayed by a physician. The patient verbalized understanding of the proper use and possible adverse effects of opioids. All of the patient's questions and concerns were addressed. They were instructed to flush the remaining pills down the toilet if they did not need them for pain.
Griseofulvin Counseling:  I discussed with the patient the risks of griseofulvin including but not limited to photosensitivity, cytopenia, liver damage, nausea/vomiting and severe allergy.  The patient understands that this medication is best absorbed when taken with a fatty meal (e.g., ice cream or french fries).
Azathioprine Counseling:  I discussed with the patient the risks of azathioprine including but not limited to myelosuppression, immunosuppression, hepatotoxicity, lymphoma, and infections.  The patient understands that monitoring is required including baseline LFTs, Creatinine, possible TPMP genotyping and weekly CBCs for the first month and then every 2 weeks thereafter.  The patient verbalized understanding of the proper use and possible adverse effects of azathioprine.  All of the patient's questions and concerns were addressed.
Topical Ketoconazole Counseling: Patient counseled that this medication may cause skin irritation or allergic reactions.  In the event of skin irritation, the patient was advised to reduce the amount of the drug applied or use it less frequently.   The patient verbalized understanding of the proper use and possible adverse effects of ketoconazole.  All of the patient's questions and concerns were addressed.
Azithromycin Pregnancy And Lactation Text: This medication is considered safe during pregnancy and is also secreted in breast milk.
Opzelura Pregnancy And Lactation Text: There is insufficient data to evaluate drug-associated risk for major birth defects, miscarriage, or other adverse maternal or fetal outcomes.  There is a pregnancy registry that monitors pregnancy outcomes in pregnant persons exposed to the medication during pregnancy.  It is unknown if this medication is excreted in breast milk.  Do not breastfeed during treatment and for about 4 weeks after the last dose.
Isotretinoin Counseling: Patient should get monthly blood tests, not donate blood, not drive at night if vision affected, not share medication, and not undergo elective surgery for 6 months after tx completed. Side effects reviewed, pt to contact office should one occur.
Nsaids Counseling: NSAID Counseling: I discussed with the patient that NSAIDs should be taken with food. Prolonged use of NSAIDs can result in the development of stomach ulcers.  Patient advised to stop taking NSAIDs if abdominal pain occurs.  The patient verbalized understanding of the proper use and possible adverse effects of NSAIDs.  All of the patient's questions and concerns were addressed.
Imiquimod Counseling:  I discussed with the patient the risks of imiquimod including but not limited to erythema, scaling, itching, weeping, crusting, and pain.  Patient understands that the inflammatory response to imiquimod is variable from person to person and was educated regarded proper titration schedule.  If flu-like symptoms develop, patient knows to discontinue the medication and contact us.
Rituxan Pregnancy And Lactation Text: This medication is Pregnancy Category C and it isn't know if it is safe during pregnancy. It is unknown if this medication is excreted in breast milk but similar antibodies are known to be excreted.
Doxycycline Counseling:  Patient counseled regarding possible photosensitivity and increased risk for sunburn.  Patient instructed to avoid sunlight, if possible.  When exposed to sunlight, patients should wear protective clothing, sunglasses, and sunscreen.  The patient was instructed to call the office immediately if the following severe adverse effects occur:  hearing changes, easy bruising/bleeding, severe headache, or vision changes.  The patient verbalized understanding of the proper use and possible adverse effects of doxycycline.  All of the patient's questions and concerns were addressed.
Isotretinoin Pregnancy And Lactation Text: This medication is Pregnancy Category X and is considered extremely dangerous during pregnancy. It is unknown if it is excreted in breast milk.
Picato Counseling:  I discussed with the patient the risks of Picato including but not limited to erythema, scaling, itching, weeping, crusting, and pain.
Bactrim Counseling:  I discussed with the patient the risks of sulfa antibiotics including but not limited to GI upset, allergic reaction, drug rash, diarrhea, dizziness, photosensitivity, and yeast infections.  Rarely, more serious reactions can occur including but not limited to aplastic anemia, agranulocytosis, methemoglobinemia, blood dyscrasias, liver or kidney failure, lung infiltrates or desquamative/blistering drug rashes.
Propranolol Counseling:  I discussed with the patient the risks of propranolol including but not limited to low heart rate, low blood pressure, low blood sugar, restlessness and increased cold sensitivity. They should call the office if they experience any of these side effects.
Sotyktu Counseling:  I discussed the most common side effects of Sotyktu including: common cold, sore throat, sinus infections, cold sores, canker sores, folliculitis, and acne.  I also discussed more serious side effects of Sotyktu including but not limited to: serious allergic reactions; increased risk for infections such as TB; cancers such as lymphomas; rhabdomyolysis and elevated CPK; and elevated triglycerides and liver enzymes. 
Solaraze Pregnancy And Lactation Text: This medication is Pregnancy Category B and is considered safe. There is some data to suggest avoiding during the third trimester. It is unknown if this medication is excreted in breast milk.
Valtrex Pregnancy And Lactation Text: this medication is Pregnancy Category B and is considered safe during pregnancy. This medication is not directly found in breast milk but it's metabolite acyclovir is present.
Adbry Counseling: I discussed with the patient the risks of tralokinumab including but not limited to eye infection and irritation, cold sores, injection site reactions, worsening of asthma, allergic reactions and increased risk of parasitic infection.  Live vaccines should be avoided while taking tralokinumab. The patient understands that monitoring is required and they must alert us or the primary physician if symptoms of infection or other concerning signs are noted.
Drysol Counseling:  I discussed with the patient the risks of drysol/aluminum chloride including but not limited to skin rash, itching, irritation, burning.
Griseofulvin Pregnancy And Lactation Text: This medication is Pregnancy Category X and is known to cause serious birth defects. It is unknown if this medication is excreted in breast milk but breast feeding should be avoided.
Dutasteride Male Counseling: Dustasteride Counseling:  I discussed with the patient the risks of use of dutasteride including but not limited to decreased libido, decreased ejaculate volume, and gynecomastia. Women who can become pregnant should not handle medication.  All of the patient's questions and concerns were addressed.
Xolair Counseling:  Patient informed of potential adverse effects including but not limited to fever, muscle aches, rash and allergic reactions.  The patient verbalized understanding of the proper use and possible adverse effects of Xolair.  All of the patient's questions and concerns were addressed.
Opioid Pregnancy And Lactation Text: These medications can lead to premature delivery and should be avoided during pregnancy. These medications are also present in breast milk in small amounts.
Sotyktu Pregnancy And Lactation Text: There is insufficient data to evaluate whether or not Sotyktu is safe to use during pregnancy.   It is not known if Sotyktu passes into breast milk and whether or not it is safe to use when breastfeeding.  
Benzoyl Peroxide Pregnancy And Lactation Text: This medication is Pregnancy Category C. It is unknown if benzoyl peroxide is excreted in breast milk.
Rifampin Counseling: I discussed with the patient the risks of rifampin including but not limited to liver damage, kidney damage, red-orange body fluids, nausea/vomiting and severe allergy.
Nsaids Pregnancy And Lactation Text: These medications are considered safe up to 30 weeks gestation. It is excreted in breast milk.
Doxycycline Pregnancy And Lactation Text: This medication is Pregnancy Category D and not consider safe during pregnancy. It is also excreted in breast milk but is considered safe for shorter treatment courses.
Winlevi Counseling:  I discussed with the patient the risks of topical clascoterone including but not limited to erythema, scaling, itching, and stinging. Patient voiced their understanding.
Glycopyrrolate Counseling:  I discussed with the patient the risks of glycopyrrolate including but not limited to skin rash, drowsiness, dry mouth, difficulty urinating, and blurred vision.
Siliq Counseling:  I discussed with the patient the risks of Siliq including but not limited to new or worsening depression, suicidal thoughts and behavior, immunosuppression, malignancy, posterior leukoencephalopathy syndrome, and serious infections.  The patient understands that monitoring is required including a PPD at baseline and must alert us or the primary physician if symptoms of infection or other concerning signs are noted. There is also a special program designed to monitor depression which is required with Siliq.
Prednisone Counseling:  I discussed with the patient the risks of prolonged use of prednisone including but not limited to weight gain, insomnia, osteoporosis, mood changes, diabetes, susceptibility to infection, glaucoma and high blood pressure.  In cases where prednisone use is prolonged, patients should be monitored with blood pressure checks, serum glucose levels and an eye exam.  Additionally, the patient may need to be placed on GI prophylaxis, PCP prophylaxis, and calcium and vitamin D supplementation and/or a bisphosphonate.  The patient verbalized understanding of the proper use and the possible adverse effects of prednisone.  All of the patient's questions and concerns were addressed.
Rifampin Pregnancy And Lactation Text: This medication is Pregnancy Category C and it isn't know if it is safe during pregnancy. It is also excreted in breast milk and should not be used if you are breast feeding.
Klisyri Counseling:  I discussed with the patient the risks of Klisyri including but not limited to erythema, scaling, itching, weeping, crusting, and pain.
Olanzapine Counseling- I discussed with the patient the common side effects of olanzapine including but are not limited to: lack of energy, dry mouth, increased appetite, sleepiness, tremor, constipation, dizziness, changes in behavior, or restlessness.  Explained that teenagers are more likely to experience headaches, abdominal pain, pain in the arms or legs, tiredness, and sleepiness.  Serious side effects include but are not limited: increased risk of death in elderly patients who are confused, have memory loss, or dementia-related psychosis; hyperglycemia; increased cholesterol and triglycerides; and weight gain.
Bactrim Pregnancy And Lactation Text: This medication is Pregnancy Category D and is known to cause fetal risk.  It is also excreted in breast milk.
Carac Counseling:  I discussed with the patient the risks of Carac including but not limited to erythema, scaling, itching, weeping, crusting, and pain.
Winlevi Pregnancy And Lactation Text: This medication is considered safe during pregnancy and breastfeeding.
Azathioprine Pregnancy And Lactation Text: This medication is Pregnancy Category D and isn't considered safe during pregnancy. It is unknown if this medication is excreted in breast milk.
Use Enhanced Medication Counseling?: No
Propranolol Pregnancy And Lactation Text: This medication is Pregnancy Category C and it isn't known if it is safe during pregnancy. It is excreted in breast milk.
Soolantra Counseling: I discussed with the patients the risks of topial Soolantra. This is a medicine which decreases the number of mites and inflammation in the skin. You experience burning, stinging, eye irritation or allergic reactions.  Please call our office if you develop any problems from using this medication.
Doxepin Counseling:  Patient advised that the medication is sedating and not to drive a car after taking this medication. Patient informed of potential adverse effects including but not limited to dry mouth, urinary retention, and blurry vision.  The patient verbalized understanding of the proper use and possible adverse effects of doxepin.  All of the patient's questions and concerns were addressed.
High Dose Vitamin A Counseling: Side effects reviewed, pt to contact office should one occur.
Doxepin Pregnancy And Lactation Text: This medication is Pregnancy Category C and it isn't known if it is safe during pregnancy. It is also excreted in breast milk and breast feeding isn't recommended.
Cellcept Counseling:  I discussed with the patient the risks of mycophenolate mofetil including but not limited to infection/immunosuppression, GI upset, hypokalemia, hypercholesterolemia, bone marrow suppression, lymphoproliferative disorders, malignancy, GI ulceration/bleed/perforation, colitis, interstitial lung disease, kidney failure, progressive multifocal leukoencephalopathy, and birth defects.  The patient understands that monitoring is required including a baseline creatinine and regular CBC testing. In addition, patient must alert us immediately if symptoms of infection or other concerning signs are noted.
Xeljanz Counseling: I discussed with the patient the risks of Xeljanz therapy including increased risk of infection, liver issues, headache, diarrhea, or cold symptoms. Live vaccines should be avoided. They were instructed to call if they have any problems.
Clofazimine Counseling:  I discussed with the patient the risks of clofazimine including but not limited to skin and eye pigmentation, liver damage, nausea/vomiting, gastrointestinal bleeding and allergy.
Soolantra Pregnancy And Lactation Text: This medication is Pregnancy Category C. This medication is considered safe during breast feeding.
Dutasteride Pregnancy And Lactation Text: This medication is absolutely contraindicated in women, especially during pregnancy and breast feeding. Feminization of male fetuses is possible if taking while pregnant.
Topical Metronidazole Counseling: Metronidazole is a topical antibiotic medication. You may experience burning, stinging, redness, or allergic reactions.  Please call our office if you develop any problems from using this medication.
Glycopyrrolate Pregnancy And Lactation Text: This medication is Pregnancy Category B and is considered safe during pregnancy. It is unknown if it is excreted breast milk.
Adbry Pregnancy And Lactation Text: It is unknown if this medication will adversely affect pregnancy or breast feeding.
Erythromycin Counseling:  I discussed with the patient the risks of erythromycin including but not limited to GI upset, allergic reaction, drug rash, diarrhea, increase in liver enzymes, and yeast infections.
Xolair Pregnancy And Lactation Text: This medication is Pregnancy Category B and is considered safe during pregnancy. This medication is excreted in breast milk.
Humira Counseling:  I discussed with the patient the risks of adalimumab including but not limited to myelosuppression, immunosuppression, autoimmune hepatitis, demyelinating diseases, lymphoma, and serious infections.  The patient understands that monitoring is required including a PPD at baseline and must alert us or the primary physician if symptoms of infection or other concerning signs are noted.
Itraconazole Counseling:  I discussed with the patient the risks of itraconazole including but not limited to liver damage, nausea/vomiting, neuropathy, and severe allergy.  The patient understands that this medication is best absorbed when taken with acidic beverages such as non-diet cola or ginger ale.  The patient understands that monitoring is required including baseline LFTs and repeat LFTs at intervals.  The patient understands that they are to contact us or the primary physician if concerning signs are noted.
Erythromycin Pregnancy And Lactation Text: This medication is Pregnancy Category B and is considered safe during pregnancy. It is also excreted in breast milk.
Klisyri Pregnancy And Lactation Text: It is unknown if this medication can harm a developing fetus or if it is excreted in breast milk.
VTAMA Counseling: I discussed with the patient that VTAMA is not for use in the eyes, mouth or mouth. They should call the office if they develop any signs of allergic reactions to VTAMA. The patient verbalized understanding of the proper use and possible adverse effects of VTAMA.  All of the patient's questions and concerns were addressed.
Finasteride Male Counseling: Finasteride Counseling:  I discussed with the patient the risks of use of finasteride including but not limited to decreased libido, decreased ejaculate volume, gynecomastia, and depression. Women should not handle medication.  All of the patient's questions and concerns were addressed.
Cephalexin Counseling: I counseled the patient regarding use of cephalexin as an antibiotic for prophylactic and/or therapeutic purposes. Cephalexin (commonly prescribed under brand name Keflex) is a cephalosporin antibiotic which is active against numerous classes of bacteria, including most skin bacteria. Side effects may include nausea, diarrhea, gastrointestinal upset, rash, hives, yeast infections, and in rare cases, hepatitis, kidney disease, seizures, fever, confusion, neurologic symptoms, and others. Patients with severe allergies to penicillin medications are cautioned that there is about a 10% incidence of cross-reactivity with cephalosporins. When possible, patients with penicillin allergies should use alternatives to cephalosporins for antibiotic therapy.
Simponi Counseling:  I discussed with the patient the risks of golimumab including but not limited to myelosuppression, immunosuppression, autoimmune hepatitis, demyelinating diseases, lymphoma, and serious infections.  The patient understands that monitoring is required including a PPD at baseline and must alert us or the primary physician if symptoms of infection or other concerning signs are noted.
High Dose Vitamin A Pregnancy And Lactation Text: High dose vitamin A therapy is contraindicated during pregnancy and breast feeding.
Protopic Counseling: Patient may experience a mild burning sensation during topical application. Protopic is not approved in children less than 2 years of age. There have been case reports of hematologic and skin malignancies in patients using topical calcineurin inhibitors although causality is questionable.
SSKI Counseling:  I discussed with the patient the risks of SSKI including but not limited to thyroid abnormalities, metallic taste, GI upset, fever, headache, acne, arthralgias, paraesthesias, lymphadenopathy, easy bleeding, arrhythmias, and allergic reaction.
Arava Counseling:  Patient counseled regarding adverse effects of Arava including but not limited to nausea, vomiting, abnormalities in liver function tests. Patients may develop mouth sores, rash, diarrhea, and abnormalities in blood counts. The patient understands that monitoring is required including LFTs and blood counts.  There is a rare possibility of scarring of the liver and lung problems that can occur when taking methotrexate. Persistent nausea, loss of appetite, pale stools, dark urine, cough, and shortness of breath should be reported immediately. Patient advised to discontinue Arava treatment and consult with a physician prior to attempting conception. The patient will have to undergo a treatment to eliminate Arava from the body prior to conception.
Olanzapine Pregnancy And Lactation Text: This medication is pregnancy category C.   There are no adequate and well controlled trials with olanzapine in pregnant females.  Olanzapine should be used during pregnancy only if the potential benefit justifies the potential risk to the fetus.   In a study in lactating healthy women, olanzapine was excreted in breast milk.  It is recommended that women taking olanzapine should not breast feed.
Sarecycline Counseling: Patient advised regarding possible photosensitivity and discoloration of the teeth, skin, lips, tongue and gums.  Patient instructed to avoid sunlight, if possible.  When exposed to sunlight, patients should wear protective clothing, sunglasses, and sunscreen.  The patient was instructed to call the office immediately if the following severe adverse effects occur:  hearing changes, easy bruising/bleeding, severe headache, or vision changes.  The patient verbalized understanding of the proper use and possible adverse effects of sarecycline.  All of the patient's questions and concerns were addressed.
Hydroxyzine Counseling: Patient advised that the medication is sedating and not to drive a car after taking this medication.  Patient informed of potential adverse effects including but not limited to dry mouth, urinary retention, and blurry vision.  The patient verbalized understanding of the proper use and possible adverse effects of hydroxyzine.  All of the patient's questions and concerns were addressed.
Sski Pregnancy And Lactation Text: This medication is Pregnancy Category D and isn't considered safe during pregnancy. It is excreted in breast milk.
Xelelizabethz Pregnancy And Lactation Text: This medication is Pregnancy Category D and is not considered safe during pregnancy.  The risk during breast feeding is also uncertain.
Cimzia Counseling:  I discussed with the patient the risks of Cimzia including but not limited to immunosuppression, allergic reactions and infections.  The patient understands that monitoring is required including a PPD at baseline and must alert us or the primary physician if symptoms of infection or other concerning signs are noted.
Erivedge Counseling- I discussed with the patient the risks of Erivedge including but not limited to nausea, vomiting, diarrhea, constipation, weight loss, changes in the sense of taste, decreased appetite, muscle spasms, and hair loss.  The patient verbalized understanding of the proper use and possible adverse effects of Erivedge.  All of the patient's questions and concerns were addressed.
Cibinqo Counseling: I discussed with the patient the risks of Cibinqo therapy including but not limited to common cold, nausea, headache, cold sores, increased blood CPK levels, dizziness, UTIs, fatigue, acne, and vomitting. Live vaccines should be avoided.  This medication has been linked to serious infections; higher rate of mortality; malignancy and lymphoproliferative disorders; major adverse cardiovascular events; thrombosis; thrombocytopenia and lymphopenia; lipid elevations; and retinal detachment.
Topical Retinoid counseling:  Patient advised to apply a pea-sized amount only at bedtime and wait 30 minutes after washing their face before applying.  If too drying, patient may add a non-comedogenic moisturizer. The patient verbalized understanding of the proper use and possible adverse effects of retinoids.  All of the patient's questions and concerns were addressed.
Elidel Counseling: Patient may experience a mild burning sensation during topical application. Elidel is not approved in children less than 2 years of age. There have been case reports of hematologic and skin malignancies in patients using topical calcineurin inhibitors although causality is questionable.
Hydroxychloroquine Counseling:  I discussed with the patient that a baseline ophthalmologic exam is needed at the start of therapy and every year thereafter while on therapy. A CBC may also be warranted for monitoring.  The side effects of this medication were discussed with the patient, including but not limited to agranulocytosis, aplastic anemia, seizures, rashes, retinopathy, and liver toxicity. Patient instructed to call the office should any adverse effect occur.  The patient verbalized understanding of the proper use and possible adverse effects of Plaquenil.  All the patient's questions and concerns were addressed.
Topical Metronidazole Pregnancy And Lactation Text: This medication is Pregnancy Category B and considered safe during pregnancy.  It is also considered safe to use while breastfeeding.
Ilumya Counseling: I discussed with the patient the risks of tildrakizumab including but not limited to immunosuppression, malignancy, posterior leukoencephalopathy syndrome, and serious infections.  The patient understands that monitoring is required including a PPD at baseline and must alert us or the primary physician if symptoms of infection or other concerning signs are noted.
Ketoconazole Counseling:   Patient counseled regarding improving absorption with orange juice.  Adverse effects include but are not limited to breast enlargement, headache, diarrhea, nausea, upset stomach, liver function test abnormalities, taste disturbance, and stomach pain.  There is a rare possibility of liver failure that can occur when taking ketoconazole. The patient understands that monitoring of LFTs may be required, especially at baseline. The patient verbalized understanding of the proper use and possible adverse effects of ketoconazole.  All of the patient's questions and concerns were addressed.
Hydroxychloroquine Pregnancy And Lactation Text: This medication has been shown to cause fetal harm but it isn't assigned a Pregnancy Risk Category. There are small amounts excreted in breast milk.
Topical Steroids Counseling: I discussed with the patient that prolonged use of topical steroids can result in the increased appearance of superficial blood vessels (telangiectasias), lightening (hypopigmentation) and thinning of the skin (atrophy).  Patient understands to avoid using high potency steroids in skin folds, the groin or the face.  The patient verbalized understanding of the proper use and possible adverse effects of topical steroids.  All of the patient's questions and concerns were addressed.
Detail Level: Simple
Vtama Pregnancy And Lactation Text: It is unknown if this medication can cause problems during pregnancy and breastfeeding.
Oral Minoxidil Counseling- I discussed with the patient the risks of oral minoxidil including but not limited to shortness of breath, swelling of the feet or ankles, dizziness, lightheadedness, unwanted hair growth and allergic reaction.  The patient verbalized understanding of the proper use and possible adverse effects of oral minoxidil.  All of the patient's questions and concerns were addressed.
Protopic Pregnancy And Lactation Text: This medication is Pregnancy Category C. It is unknown if this medication is excreted in breast milk when applied topically.
Minoxidil Counseling: Minoxidil is a topical medication which can increase blood flow where it is applied. It is uncertain how this medication increases hair growth. Side effects are uncommon and include stinging and allergic reactions.
Finasteride Pregnancy And Lactation Text: This medication is absolutely contraindicated during pregnancy. It is unknown if it is excreted in breast milk.
Metronidazole Counseling:  I discussed with the patient the risks of metronidazole including but not limited to seizures, nausea/vomiting, a metallic taste in the mouth, nausea/vomiting and severe allergy.
Skyrizi Counseling: I discussed with the patient the risks of risankizumab-rzaa including but not limited to immunosuppression, and serious infections.  The patient understands that monitoring is required including a PPD at baseline and must alert us or the primary physician if symptoms of infection or other concerning signs are noted.
Tetracycline Counseling: Patient counseled regarding possible photosensitivity and increased risk for sunburn.  Patient instructed to avoid sunlight, if possible.  When exposed to sunlight, patients should wear protective clothing, sunglasses, and sunscreen.  The patient was instructed to call the office immediately if the following severe adverse effects occur:  hearing changes, easy bruising/bleeding, severe headache, or vision changes.  The patient verbalized understanding of the proper use and possible adverse effects of tetracycline.  All of the patient's questions and concerns were addressed. Patient understands to avoid pregnancy while on therapy due to potential birth defects.
Stelara Counseling:  I discussed with the patient the risks of ustekinumab including but not limited to immunosuppression, malignancy, posterior leukoencephalopathy syndrome, and serious infections.  The patient understands that monitoring is required including a PPD at baseline and must alert us or the primary physician if symptoms of infection or other concerning signs are noted.
Cibinqo Pregnancy And Lactation Text: It is unknown if this medication will adversely affect pregnancy or breast feeding.  You should not take this medication if you are currently pregnant or planning a pregnancy or while breastfeeding.
Qbrexza Counseling:  I discussed with the patient the risks of Qbrexza including but not limited to headache, mydriasis, blurred vision, dry eyes, nasal dryness, dry mouth, dry throat, dry skin, urinary hesitation, and constipation.  Local skin reactions including erythema, burning, stinging, and itching can also occur.
Colchicine Counseling:  Patient counseled regarding adverse effects including but not limited to stomach upset (nausea, vomiting, stomach pain, or diarrhea).  Patient instructed to limit alcohol consumption while taking this medication.  Colchicine may reduce blood counts especially with prolonged use.  The patient understands that monitoring of kidney function and blood counts may be required, especially at baseline. The patient verbalized understanding of the proper use and possible adverse effects of colchicine.  All of the patient's questions and concerns were addressed.
Cyclophosphamide Counseling:  I discussed with the patient the risks of cyclophosphamide including but not limited to hair loss, hormonal abnormalities, decreased fertility, abdominal pain, diarrhea, nausea and vomiting, bone marrow suppression and infection. The patient understands that monitoring is required while taking this medication.
Hydroxyzine Pregnancy And Lactation Text: This medication is not safe during pregnancy and should not be taken. It is also excreted in breast milk and breast feeding isn't recommended.
Thalidomide Counseling: I discussed with the patient the risks of thalidomide including but not limited to birth defects, anxiety, weakness, chest pain, dizziness, cough and severe allergy.
Cimzia Pregnancy And Lactation Text: This medication crosses the placenta but can be considered safe in certain situations. Cimzia may be excreted in breast milk.
Cyclophosphamide Pregnancy And Lactation Text: This medication is Pregnancy Category D and it isn't considered safe during pregnancy. This medication is excreted in breast milk.
Cosentyx Counseling:  I discussed with the patient the risks of Cosentyx including but not limited to worsening of Crohn's disease, immunosuppression, allergic reactions and infections.  The patient understands that monitoring is required including a PPD at baseline and must alert us or the primary physician if symptoms of infection or other concerning signs are noted.
Ketoconazole Pregnancy And Lactation Text: This medication is Pregnancy Category C and it isn't know if it is safe during pregnancy. It is also excreted in breast milk and breast feeding isn't recommended.
Birth Control Pills Counseling: Birth Control Pill Counseling: I discussed with the patient the potential side effects of OCPs including but not limited to increased risk of stroke, heart attack, thrombophlebitis, deep venous thrombosis, hepatic adenomas, breast changes, GI upset, headaches, and depression.  The patient verbalized understanding of the proper use and possible adverse effects of OCPs. All of the patient's questions and concerns were addressed.
Aklief counseling:  Patient advised to apply a pea-sized amount only at bedtime and wait 30 minutes after washing their face before applying.  If too drying, patient may add a non-comedogenic moisturizer.  The most commonly reported side effects including irritation, redness, scaling, dryness, stinging, burning, itching, and increased risk of sunburn.  The patient verbalized understanding of the proper use and possible adverse effects of retinoids.  All of the patient's questions and concerns were addressed.
Acitretin Counseling:  I discussed with the patient the risks of acitretin including but not limited to hair loss, dry lips/skin/eyes, liver damage, hyperlipidemia, depression/suicidal ideation, photosensitivity.  Serious rare side effects can include but are not limited to pancreatitis, pseudotumor cerebri, bony changes, clot formation/stroke/heart attack.  Patient understands that alcohol is contraindicated since it can result in liver toxicity and significantly prolong the elimination of the drug by many years.
Topical Steroids Applications Pregnancy And Lactation Text: Most topical steroids are considered safe to use during pregnancy and lactation.  Any topical steroid applied to the breast or nipple should be washed off before breastfeeding.
Metronidazole Pregnancy And Lactation Text: This medication is Pregnancy Category B and considered safe during pregnancy.  It is also excreted in breast milk.
Oral Minoxidil Pregnancy And Lactation Text: This medication should only be used when clearly needed if you are pregnant, attempting to become pregnant or breast feeding.
Low Dose Naltrexone Counseling- I discussed with the patient the potential risks and side effects of low dose naltrexone including but not limited to: more vivid dreams, headaches, nausea, vomiting, abdominal pain, fatigue, dizziness, and anxiety.
Zoryve Counseling:  I discussed with the patient that Zoryve is not for use in the eyes, mouth or vagina. The most commonly reported side effects include diarrhea, headache, insomnia, application site pain, upper respiratory tract infections, and urinary tract infections.  All of the patient's questions and concerns were addressed.
Eucrisa Counseling: Patient may experience a mild burning sensation during topical application. Eucrisa is not approved in children less than 3 months of age.
Mirvaso Counseling: Mirvaso is a topical medication which can decrease superficial blood flow where applied. Side effects are uncommon and include stinging, redness and allergic reactions.
Libtayo Counseling- I discussed with the patient the risks of Libtayo including but not limited to nausea, vomiting, diarrhea, and bone or muscle pain.  The patient verbalized understanding of the proper use and possible adverse effects of Libtayo.  All of the patient's questions and concerns were addressed.
Minocycline Counseling: Patient advised regarding possible photosensitivity and discoloration of the teeth, skin, lips, tongue and gums.  Patient instructed to avoid sunlight, if possible.  When exposed to sunlight, patients should wear protective clothing, sunglasses, and sunscreen.  The patient was instructed to call the office immediately if the following severe adverse effects occur:  hearing changes, easy bruising/bleeding, severe headache, or vision changes.  The patient verbalized understanding of the proper use and possible adverse effects of minocycline.  All of the patient's questions and concerns were addressed.
Acitretin Pregnancy And Lactation Text: This medication is Pregnancy Category X and should not be given to women who are pregnant or may become pregnant in the future. This medication is excreted in breast milk.
Albendazole Counseling:  I discussed with the patient the risks of albendazole including but not limited to cytopenia, kidney damage, nausea/vomiting and severe allergy.  The patient understands that this medication is being used in an off-label manner.
Otezla Counseling: The side effects of Otezla were discussed with the patient, including but not limited to worsening or new depression, weight loss, diarrhea, nausea, upper respiratory tract infection, and headache. Patient instructed to call the office should any adverse effect occur.  The patient verbalized understanding of the proper use and possible adverse effects of Otezla.  All the patient's questions and concerns were addressed.
Olumiant Counseling: I discussed with the patient the risks of Olumiant therapy including but not limited to upper respiratory tract infections, shingles, cold sores, and nausea. Live vaccines should be avoided.  This medication has been linked to serious infections; higher rate of mortality; malignancy and lymphoproliferative disorders; major adverse cardiovascular events; thrombosis; gastrointestinal perforations; neutropenia; lymphopenia; anemia; liver enzyme elevations; and lipid elevations.
Qbrexza Pregnancy And Lactation Text: There is no available data on Qbrexza use in pregnant women.  There is no available data on Qbrexza use in lactation.
Tazorac Counseling:  Patient advised that medication is irritating and drying.  Patient may need to apply sparingly and wash off after an hour before eventually leaving it on overnight.  The patient verbalized understanding of the proper use and possible adverse effects of tazorac.  All of the patient's questions and concerns were addressed.

## 2023-07-10 NOTE — PROCEDURE: ADDITIONAL NOTES
Render Risk Assessment In Note?: no
Additional Notes: Biopsy and destruction of ED&C at next visit
Detail Level: Simple

## 2023-07-13 PROBLEM — Z74.09 MOBILITY IMPAIRED: Status: ACTIVE | Noted: 2023-07-13

## 2023-07-13 PROBLEM — R21 RASH OF FACE: Status: ACTIVE | Noted: 2023-07-13

## 2023-08-07 ENCOUNTER — APPOINTMENT (RX ONLY)
Dept: URBAN - METROPOLITAN AREA CLINIC 4 | Facility: CLINIC | Age: 88
Setting detail: DERMATOLOGY
End: 2023-08-07

## 2023-08-07 DIAGNOSIS — L57.0 ACTINIC KERATOSIS: ICD-10-CM

## 2023-08-07 DIAGNOSIS — L82.1 OTHER SEBORRHEIC KERATOSIS: ICD-10-CM

## 2023-08-07 PROBLEM — D48.5 NEOPLASM OF UNCERTAIN BEHAVIOR OF SKIN: Status: ACTIVE | Noted: 2023-08-07

## 2023-08-07 PROCEDURE — ? COUNSELING

## 2023-08-07 PROCEDURE — 99212 OFFICE O/P EST SF 10 MIN: CPT | Mod: 25

## 2023-08-07 PROCEDURE — ? BIOPSY BY SHAVE METHOD

## 2023-08-07 PROCEDURE — ? LIQUID NITROGEN

## 2023-08-07 PROCEDURE — ? ADDITIONAL NOTES

## 2023-08-07 PROCEDURE — 17000 DESTRUCT PREMALG LESION: CPT | Mod: 59

## 2023-08-07 PROCEDURE — 11102 TANGNTL BX SKIN SINGLE LES: CPT

## 2023-08-07 ASSESSMENT — LOCATION DETAILED DESCRIPTION DERM
LOCATION DETAILED: RIGHT PROXIMAL DORSAL FOREARM
LOCATION DETAILED: LEFT INFERIOR CENTRAL MALAR CHEEK

## 2023-08-07 ASSESSMENT — LOCATION ZONE DERM
LOCATION ZONE: ARM
LOCATION ZONE: FACE

## 2023-08-07 ASSESSMENT — LOCATION SIMPLE DESCRIPTION DERM
LOCATION SIMPLE: LEFT CHEEK
LOCATION SIMPLE: RIGHT FOREARM

## 2023-08-07 NOTE — PROCEDURE: LIQUID NITROGEN
Show Applicator Variable?: Yes
Number Of Freeze-Thaw Cycles: 1 freeze-thaw cycle
Render Post-Care Instructions In Note?: no
Detail Level: Detailed
Consent: The patient's consent was obtained including but not limited to risks of crusting, scabbing, blistering, scarring, darker or lighter pigmentary change, recurrence, incomplete removal and infection.
Duration Of Freeze Thaw-Cycle (Seconds): 3
Post-Care Instructions: I reviewed with the patient in detail post-care instructions. Patient is to wear sunprotection, and avoid picking at any of the treated lesions. Pt may apply Vaseline to crusted or scabbing areas.
Aperture Size (Optional): C
Application Tool (Optional): Cry-AC

## 2023-08-07 NOTE — PROCEDURE: BIOPSY BY SHAVE METHOD
Detail Level: Detailed
Depth Of Biopsy: dermis
Was A Bandage Applied: Yes
Size Of Lesion In Cm: 0
Biopsy Type: H and E
Biopsy Method: Dermablade
Anesthesia Type: 1% lidocaine with epinephrine
Anesthesia Volume In Cc: 0.5
Hemostasis: Drysol
Wound Care: Petrolatum
Dressing: bandage
Type Of Destruction Used: Electrodesiccation and Curettage
Curettage Text: The wound bed was treated with curettage after the biopsy was performed.
Cryotherapy Text: The wound bed was treated with cryotherapy after the biopsy was performed.
Electrodesiccation Text: The wound bed was treated with electrodesiccation after the biopsy was performed.
Electrodesiccation And Curettage Text: The wound bed was treated with electrodesiccation and curettage after the biopsy was performed.
Silver Nitrate Text: The wound bed was treated with silver nitrate after the biopsy was performed.
Lab: [id number]
Lab Facility: 
Render Path Notes In Note?: No
Consent: Written consent was obtained and risks were reviewed including but not limited to scarring, infection, bleeding, scabbing, incomplete removal, nerve damage and allergy to anesthesia.
Post-Care Instructions: I reviewed with the patient in detail post-care instructions. Patient is to keep the biopsy site dry overnight, and then apply bacitracin twice daily until healed. Patient may apply hydrogen peroxide soaks to remove any crusting.
Notification Instructions: Patient will be notified of biopsy results. However, patient instructed to call the office if not contacted within 2 weeks.
Billing Type: Third-Party Bill
Information: Selecting Yes will display possible errors in your note based on the variables you have selected. This validation is only offered as a suggestion for you. PLEASE NOTE THAT THE VALIDATION TEXT WILL BE REMOVED WHEN YOU FINALIZE YOUR NOTE. IF YOU WANT TO FAX A PRELIMINARY NOTE YOU WILL NEED TO TOGGLE THIS TO 'NO' IF YOU DO NOT WANT IT IN YOUR FAXED NOTE.

## 2023-08-14 ENCOUNTER — HOSPITAL ENCOUNTER (OUTPATIENT)
Facility: MEDICAL CENTER | Age: 88
End: 2023-08-14
Attending: INTERNAL MEDICINE
Payer: MEDICARE

## 2023-08-14 LAB
ALBUMIN SERPL BCP-MCNC: 4.4 G/DL (ref 3.2–4.9)
ALBUMIN/GLOB SERPL: 2 G/DL
ALP SERPL-CCNC: 69 U/L (ref 30–99)
ALT SERPL-CCNC: 35 U/L (ref 2–50)
ANION GAP SERPL CALC-SCNC: 11 MMOL/L (ref 7–16)
AST SERPL-CCNC: 25 U/L (ref 12–45)
BASOPHILS # BLD AUTO: 1 % (ref 0–1.8)
BASOPHILS # BLD: 0.1 K/UL (ref 0–0.12)
BILIRUB SERPL-MCNC: 0.5 MG/DL (ref 0.1–1.5)
BUN SERPL-MCNC: 15 MG/DL (ref 8–22)
CALCIUM ALBUM COR SERPL-MCNC: 9.4 MG/DL (ref 8.5–10.5)
CALCIUM SERPL-MCNC: 9.7 MG/DL (ref 8.5–10.5)
CHLORIDE SERPL-SCNC: 103 MMOL/L (ref 96–112)
CO2 SERPL-SCNC: 26 MMOL/L (ref 20–33)
CREAT SERPL-MCNC: 0.6 MG/DL (ref 0.5–1.4)
EOSINOPHIL # BLD AUTO: 0.26 K/UL (ref 0–0.51)
EOSINOPHIL NFR BLD: 2.7 % (ref 0–6.9)
ERYTHROCYTE [DISTWIDTH] IN BLOOD BY AUTOMATED COUNT: 55.2 FL (ref 35.9–50)
GFR SERPLBLD CREATININE-BSD FMLA CKD-EPI: 92 ML/MIN/1.73 M 2
GLOBULIN SER CALC-MCNC: 2.2 G/DL (ref 1.9–3.5)
GLUCOSE SERPL-MCNC: 96 MG/DL (ref 65–99)
HCT VFR BLD AUTO: 45.3 % (ref 42–52)
HGB BLD-MCNC: 14.2 G/DL (ref 14–18)
IMM GRANULOCYTES # BLD AUTO: 0.02 K/UL (ref 0–0.11)
IMM GRANULOCYTES NFR BLD AUTO: 0.2 % (ref 0–0.9)
LYMPHOCYTES # BLD AUTO: 3.25 K/UL (ref 1–4.8)
LYMPHOCYTES NFR BLD: 33.2 % (ref 22–41)
MCH RBC QN AUTO: 31.3 PG (ref 27–33)
MCHC RBC AUTO-ENTMCNC: 31.3 G/DL (ref 32.3–36.5)
MCV RBC AUTO: 100 FL (ref 81.4–97.8)
MONOCYTES # BLD AUTO: 0.91 K/UL (ref 0–0.85)
MONOCYTES NFR BLD AUTO: 9.3 % (ref 0–13.4)
NEUTROPHILS # BLD AUTO: 5.26 K/UL (ref 1.82–7.42)
NEUTROPHILS NFR BLD: 53.6 % (ref 44–72)
NRBC # BLD AUTO: 0 K/UL
NRBC BLD-RTO: 0 /100 WBC (ref 0–0.2)
PLATELET # BLD AUTO: 209 K/UL (ref 164–446)
PMV BLD AUTO: 10.9 FL (ref 9–12.9)
POTASSIUM SERPL-SCNC: 5 MMOL/L (ref 3.6–5.5)
PROT SERPL-MCNC: 6.6 G/DL (ref 6–8.2)
RBC # BLD AUTO: 4.53 M/UL (ref 4.7–6.1)
SODIUM SERPL-SCNC: 140 MMOL/L (ref 135–145)
WBC # BLD AUTO: 9.8 K/UL (ref 4.8–10.8)

## 2023-08-14 PROCEDURE — 80061 LIPID PANEL: CPT

## 2023-08-14 PROCEDURE — 85025 COMPLETE CBC W/AUTO DIFF WBC: CPT

## 2023-08-14 PROCEDURE — 80053 COMPREHEN METABOLIC PANEL: CPT

## 2023-08-15 LAB
CHOLEST SERPL-MCNC: 140 MG/DL (ref 100–199)
HDLC SERPL-MCNC: 39 MG/DL
LDLC SERPL CALC-MCNC: 64 MG/DL
TRIGL SERPL-MCNC: 185 MG/DL (ref 0–149)

## 2023-08-24 PROBLEM — H35.30 MACULAR DEGENERATION: Status: ACTIVE | Noted: 2023-08-24

## 2023-09-12 NOTE — ASSESSMENT & PLAN NOTE
Pt is following with Dr. Reyes of [facility]. He is on xarelto for this and is pending an echocardiogram. He gets occasional fluttering/palpitations. He was seen yesterday by cardiology. treatment will be determined after the echocardiogram. He had a cardioversion a few weeks ago but the results were only transient.    Red S (Sepsis)

## 2023-10-16 ENCOUNTER — APPOINTMENT (RX ONLY)
Dept: URBAN - METROPOLITAN AREA CLINIC 4 | Facility: CLINIC | Age: 88
Setting detail: DERMATOLOGY
End: 2023-10-16

## 2023-10-16 PROBLEM — C44.41 BASAL CELL CARCINOMA OF SKIN OF SCALP AND NECK: Status: ACTIVE | Noted: 2023-10-16

## 2023-10-16 PROCEDURE — ? EXCISION

## 2023-10-16 PROCEDURE — 13121 CMPLX RPR S/A/L 2.6-7.5 CM: CPT

## 2023-10-16 PROCEDURE — 11623 EXC S/N/H/F/G MAL+MRG 2.1-3: CPT

## 2023-10-16 NOTE — PROCEDURE: EXCISION
Surgeon (Optional): Alonso
Biopsy Photograph Reviewed: Yes
Previous Accession (Optional): [id number]
Size Of Lesion In Cm: 1.9
X Size Of Lesion In Cm (Optional): 0
Size Of Margin In Cm: 0.2
Anesthesia Volume In Cc: 12
Was An Eye Clamp Used?: No
Eye Clamp Note Details: An eye clamp was used during the procedure.
Excision Method: Elliptical
Saucerization Depth: dermis and superficial adipose tissue
Repair Type: Complex
Suturegard Retention Suture: 2-0 Nylon
Retention Suture Bite Size: 3 mm
Length To Time In Minutes Device Was In Place: 10
Number Of Hemigard Strips Per Side: 1
Intermediate / Complex Repair - Final Wound Length In Cm: 4.4
Distance Of Undermining In Cm (Required): 2.1
Undermining Type: Entire Wound
Debridement Text: The wound edges were debrided prior to proceeding with the closure to facilitate wound healing.
Helical Rim Text: The closure involved the helical rim.
Vermilion Border Text: The closure involved the vermilion border.
Nostril Rim Text: The closure involved the nostril rim.
Retention Suture Text: Retention sutures were placed to support the closure and prevent dehiscence.
Lab: [id number]
Lab Facility: 
Graft Donor Site Bandage (Optional-Leave Blank If You Don't Want In Note): Steri-strips and a pressure bandage were applied to the donor site.
Epidermal Closure Graft Donor Site (Optional): simple interrupted
Billing Type: Third-Party Bill
Excision Depth: adipose tissue
Scalpel Size: 15 blade
Anesthesia Type: 1% lidocaine with epinephrine
Additional Anesthesia Volume In Cc: 6
Hemostasis: Electrocautery
Estimated Blood Loss (Cc): minimal
Detail Level: Detailed
Repair Anesthesia Method: local infiltration
Deep Sutures: 4-0 Vicryl
Dermal Closure: buried vertical mattress
Epidermal Sutures: 5-0 Caprosyn
Epidermal Closure: running subcuticular
Wound Care: Bacitracin
Dressing: dry sterile dressing
Suturegard Intro: Intraoperative tissue expansion was performed, utilizing the SUTUREGARD device, in order to reduce wound tension.
Suturegard Body: The suture ends were repeatedly re-tightened and re-clamped to achieve the desired tissue expansion.
Hemigard Intro: Due to skin fragility and wound tension, it was decided to use HEMIGARD adhesive retention suture devices to permit a linear closure. The skin was cleaned and dried for a 6cm distance away from the wound. Excessive hair, if present, was removed to allow for adhesion.
Hemigard Postcare Instructions: The HEMIGARD strips are to remain completely dry for at least 5-7 days.
Positioning (Leave Blank If You Do Not Want): The patient was placed in a comfortable position exposing the surgical site.
Pre-Excision Curettage Text (Leave Blank If You Do Not Want): Prior to drawing the surgical margin the visible lesion was removed with electrodesiccation and curettage to clearly define the lesion size.
Complex Repair Preamble Text (Leave Blank If You Do Not Want): Extensive wide undermining was performed.
Intermediate Repair Preamble Text (Leave Blank If You Do Not Want): Undermining was performed with blunt dissection.
Curvilinear Excision Additional Text (Leave Blank If You Do Not Want): The margin was drawn around the clinically apparent lesion.  A curvilinear shape was then drawn on the skin incorporating the lesion and margins.  Incisions were then made along these lines to the appropriate tissue plane and the lesion was extirpated.
Fusiform Excision Additional Text (Leave Blank If You Do Not Want): The margin was drawn around the clinically apparent lesion.  A fusiform shape was then drawn on the skin incorporating the lesion and margins.  Incisions were then made along these lines to the appropriate tissue plane and the lesion was extirpated.
Elliptical Excision Additional Text (Leave Blank If You Do Not Want): The margin was drawn around the clinically apparent lesion.  An elliptical shape was then drawn on the skin incorporating the lesion and margins.  Incisions were then made along these lines to the appropriate tissue plane and the lesion was extirpated.
Saucerization Excision Additional Text (Leave Blank If You Do Not Want): The margin was drawn around the clinically apparent lesion.  Incisions were then made along these lines, in a tangential fashion, to the appropriate tissue plane and the lesion was extirpated.
Slit Excision Additional Text (Leave Blank If You Do Not Want): A linear line was drawn on the skin overlying the lesion. An incision was made slowly until the lesion was visualized.  Once visualized, the lesion was removed with blunt dissection.
Excisional Biopsy Additional Text (Leave Blank If You Do Not Want): The margin was drawn around the clinically apparent lesion. An elliptical shape was then drawn on the skin incorporating the lesion and margins.  Incisions were then made along these lines to the appropriate tissue plane and the lesion was extirpated.
Perilesional Excision Additional Text (Leave Blank If You Do Not Want): The margin was drawn around the clinically apparent lesion. Incisions were then made along these lines to the appropriate tissue plane and the lesion was extirpated.
Repair Performed By Another Provider Text (Leave Blank If You Do Not Want): After the tissue was excised the defect was repaired by another provider.
No Repair - Repaired With Adjacent Surgical Defect Text (Leave Blank If You Do Not Want): After the excision the defect was repaired concurrently with another surgical defect which was in close approximation.
Adjacent Tissue Transfer Text: The defect edges were debeveled with a #15 scalpel blade. Given the location of the defect and the proximity to free margins an adjacent tissue transfer was deemed most appropriate. Using a sterile surgical marker, an appropriate flap was drawn incorporating the defect and placing the expected incisions within the relaxed skin tension lines where possible. The area thus outlined was incised deep to adipose tissue with a #15 scalpel blade. The skin margins were undermined to an appropriate distance in all directions utilizing iris scissors and carried over to close the primary defect.
Advancement Flap (Single) Text: The defect edges were debeveled with a #15 scalpel blade.  Given the location of the defect and the proximity to free margins a single advancement flap was deemed most appropriate.  Using a sterile surgical marker, an appropriate advancement flap was drawn incorporating the defect and placing the expected incisions within the relaxed skin tension lines where possible.    The area thus outlined was incised deep to adipose tissue with a #15 scalpel blade.  The skin margins were undermined to an appropriate distance in all directions utilizing iris scissors.
Advancement Flap (Double) Text: The defect edges were debeveled with a #15 scalpel blade.  Given the location of the defect and the proximity to free margins a double advancement flap was deemed most appropriate.  Using a sterile surgical marker, the appropriate advancement flaps were drawn incorporating the defect and placing the expected incisions within the relaxed skin tension lines where possible.    The area thus outlined was incised deep to adipose tissue with a #15 scalpel blade.  The skin margins were undermined to an appropriate distance in all directions utilizing iris scissors.
Burow's Advancement Flap Text: The defect edges were debeveled with a #15 scalpel blade.  Given the location of the defect and the proximity to free margins a Burow's advancement flap was deemed most appropriate.  Using a sterile surgical marker, the appropriate advancement flap was drawn incorporating the defect and placing the expected incisions within the relaxed skin tension lines where possible.    The area thus outlined was incised deep to adipose tissue with a #15 scalpel blade.  The skin margins were undermined to an appropriate distance in all directions utilizing iris scissors.
Chonodrocutaneous Helical Advancement Flap Text: The defect edges were debeveled with a #15 scalpel blade. Given the location of the defect and the proximity to free margins a chondrocutaneous helical advancement flap was deemed most appropriate. Using a sterile surgical marker, the appropriate advancement flap was drawn incorporating the defect and placing the expected incisions within the relaxed skin tension lines where possible. The area thus outlined was incised deep to adipose tissue with a #15 scalpel blade. The skin margins were undermined to an appropriate distance in all directions utilizing iris scissors. Following this, the designed flap was advanced and carried over into the primary defect and sutured into place.
Crescentic Advancement Flap Text: The defect edges were debeveled with a #15 scalpel blade.  Given the location of the defect and the proximity to free margins a crescentic advancement flap was deemed most appropriate.  Using a sterile surgical marker, the appropriate advancement flap was drawn incorporating the defect and placing the expected incisions within the relaxed skin tension lines where possible.    The area thus outlined was incised deep to adipose tissue with a #15 scalpel blade.  The skin margins were undermined to an appropriate distance in all directions utilizing iris scissors.
A-T Advancement Flap Text: The defect edges were debeveled with a #15 scalpel blade.  Given the location of the defect, shape of the defect and the proximity to free margins an A-T advancement flap was deemed most appropriate.  Using a sterile surgical marker, an appropriate advancement flap was drawn incorporating the defect and placing the expected incisions within the relaxed skin tension lines where possible.    The area thus outlined was incised deep to adipose tissue with a #15 scalpel blade.  The skin margins were undermined to an appropriate distance in all directions utilizing iris scissors.
O-T Advancement Flap Text: The defect edges were debeveled with a #15 scalpel blade.  Given the location of the defect, shape of the defect and the proximity to free margins an O-T advancement flap was deemed most appropriate.  Using a sterile surgical marker, an appropriate advancement flap was drawn incorporating the defect and placing the expected incisions within the relaxed skin tension lines where possible.    The area thus outlined was incised deep to adipose tissue with a #15 scalpel blade.  The skin margins were undermined to an appropriate distance in all directions utilizing iris scissors.
O-L Flap Text: The defect edges were debeveled with a #15 scalpel blade.  Given the location of the defect, shape of the defect and the proximity to free margins an O-L flap was deemed most appropriate.  Using a sterile surgical marker, an appropriate advancement flap was drawn incorporating the defect and placing the expected incisions within the relaxed skin tension lines where possible.    The area thus outlined was incised deep to adipose tissue with a #15 scalpel blade.  The skin margins were undermined to an appropriate distance in all directions utilizing iris scissors.
O-Z Flap Text: The defect edges were debeveled with a #15 scalpel blade. Given the location of the defect, shape of the defect and the proximity to free margins an O-Z flap was deemed most appropriate. Using a sterile surgical marker, an appropriate transposition flap was drawn incorporating the defect and placing the expected incisions within the relaxed skin tension lines where possible. The area thus outlined was incised deep to adipose tissue with a #15 scalpel blade. The skin margins were undermined to an appropriate distance in all directions utilizing iris scissors. Following this, the designed flap was carried over into the primary defect and sutured into place.
Double O-Z Flap Text: The defect edges were debeveled with a #15 scalpel blade. Given the location of the defect, shape of the defect and the proximity to free margins a Double O-Z flap was deemed most appropriate. Using a sterile surgical marker, an appropriate transposition flap was drawn incorporating the defect and placing the expected incisions within the relaxed skin tension lines where possible. The area thus outlined was incised deep to adipose tissue with a #15 scalpel blade. The skin margins were undermined to an appropriate distance in all directions utilizing iris scissors. Following this, the designed flap was carried over into the primary defect and sutured into place.
V-Y Flap Text: The defect edges were debeveled with a #15 scalpel blade.  Given the location of the defect, shape of the defect and the proximity to free margins a V-Y flap was deemed most appropriate.  Using a sterile surgical marker, an appropriate advancement flap was drawn incorporating the defect and placing the expected incisions within the relaxed skin tension lines where possible.    The area thus outlined was incised deep to adipose tissue with a #15 scalpel blade.  The skin margins were undermined to an appropriate distance in all directions utilizing iris scissors.
Advancement-Rotation Flap Text: The defect edges were debeveled with a #15 scalpel blade.  Given the location of the defect, shape of the defect and the proximity to free margins an advancement-rotation flap was deemed most appropriate.  Using a sterile surgical marker, an appropriate flap was drawn incorporating the defect and placing the expected incisions within the relaxed skin tension lines where possible. The area thus outlined was incised deep to adipose tissue with a #15 scalpel blade.  The skin margins were undermined to an appropriate distance in all directions utilizing iris scissors.
Mercedes Flap Text: The defect edges were debeveled with a #15 scalpel blade. Given the location of the defect, shape of the defect and the proximity to free margins a Mercedes flap was deemed most appropriate. Using a sterile surgical marker, an appropriate advancement flap was drawn incorporating the defect and placing the expected incisions within the relaxed skin tension lines where possible. The area thus outlined was incised deep to adipose tissue with a #15 scalpel blade. The skin margins were undermined to an appropriate distance in all directions utilizing iris scissors. Following this, the designed flap was advanced and carried over into the primary defect and sutured into place.
Modified Advancement Flap Text: The defect edges were debeveled with a #15 scalpel blade.  Given the location of the defect, shape of the defect and the proximity to free margins a modified advancement flap was deemed most appropriate.  Using a sterile surgical marker, an appropriate advancement flap was drawn incorporating the defect and placing the expected incisions within the relaxed skin tension lines where possible.    The area thus outlined was incised deep to adipose tissue with a #15 scalpel blade.  The skin margins were undermined to an appropriate distance in all directions utilizing iris scissors.
Mucosal Advancement Flap Text: Given the location of the defect, shape of the defect and the proximity to free margins a mucosal advancement flap was deemed most appropriate. Incisions were made with a 15 blade scalpel in the appropriate fashion along the cutaneous vermilion border and the mucosal lip. The remaining actinically damaged mucosal tissue was excised.  The mucosal advancement flap was then elevated to the gingival sulcus with care taken to preserve the neurovascular structures and advanced into the primary defect. Care was taken to ensure that precise realignment of the vermilion border was achieved.
Peng Advancement Flap Text: The defect edges were debeveled with a #15 scalpel blade. Given the location of the defect, shape of the defect and the proximity to free margins a Peng advancement flap was deemed most appropriate. Using a sterile surgical marker, an appropriate advancement flap was drawn incorporating the defect and placing the expected incisions within the relaxed skin tension lines where possible. The area thus outlined was incised deep to adipose tissue with a #15 scalpel blade. The skin margins were undermined to an appropriate distance in all directions utilizing iris scissors. Following this, the designed flap was advanced and carried over into the primary defect and sutured into place.
Hatchet Flap Text: The defect edges were debeveled with a #15 scalpel blade.  Given the location of the defect, shape of the defect and the proximity to free margins a hatchet flap was deemed most appropriate.  Using a sterile surgical marker, an appropriate hatchet flap was drawn incorporating the defect and placing the expected incisions within the relaxed skin tension lines where possible.    The area thus outlined was incised deep to adipose tissue with a #15 scalpel blade.  The skin margins were undermined to an appropriate distance in all directions utilizing iris scissors.
Rotation Flap Text: The defect edges were debeveled with a #15 scalpel blade.  Given the location of the defect, shape of the defect and the proximity to free margins a rotation flap was deemed most appropriate.  Using a sterile surgical marker, an appropriate rotation flap was drawn incorporating the defect and placing the expected incisions within the relaxed skin tension lines where possible.    The area thus outlined was incised deep to adipose tissue with a #15 scalpel blade.  The skin margins were undermined to an appropriate distance in all directions utilizing iris scissors.
Bilateral Rotation Flap Text: The defect edges were debeveled with a #15 scalpel blade. Given the location of the defect, shape of the defect and the proximity to free margins a bilateral rotation flap was deemed most appropriate. Using a sterile surgical marker, an appropriate rotation flap was drawn incorporating the defect and placing the expected incisions within the relaxed skin tension lines where possible. The area thus outlined was incised deep to adipose tissue with a #15 scalpel blade. The skin margins were undermined to an appropriate distance in all directions utilizing iris scissors. Following this, the designed flap was carried over into the primary defect and sutured into place.
Spiral Flap Text: The defect edges were debeveled with a #15 scalpel blade.  Given the location of the defect, shape of the defect and the proximity to free margins a spiral flap was deemed most appropriate.  Using a sterile surgical marker, an appropriate rotation flap was drawn incorporating the defect and placing the expected incisions within the relaxed skin tension lines where possible. The area thus outlined was incised deep to adipose tissue with a #15 scalpel blade.  The skin margins were undermined to an appropriate distance in all directions utilizing iris scissors.
Staged Advancement Flap Text: The defect edges were debeveled with a #15 scalpel blade. Given the location of the defect, shape of the defect and the proximity to free margins a staged advancement flap was deemed most appropriate. Using a sterile surgical marker, an appropriate advancement flap was drawn incorporating the defect and placing the expected incisions within the relaxed skin tension lines where possible. The area thus outlined was incised deep to adipose tissue with a #15 scalpel blade. The skin margins were undermined to an appropriate distance in all directions utilizing iris scissors. Following this, the designed flap was carried over into the primary defect and sutured into place.
Star Wedge Flap Text: The defect edges were debeveled with a #15 scalpel blade.  Given the location of the defect, shape of the defect and the proximity to free margins a star wedge flap was deemed most appropriate.  Using a sterile surgical marker, an appropriate rotation flap was drawn incorporating the defect and placing the expected incisions within the relaxed skin tension lines where possible. The area thus outlined was incised deep to adipose tissue with a #15 scalpel blade.  The skin margins were undermined to an appropriate distance in all directions utilizing iris scissors.
Transposition Flap Text: The defect edges were debeveled with a #15 scalpel blade.  Given the location of the defect and the proximity to free margins a transposition flap was deemed most appropriate.  Using a sterile surgical marker, an appropriate transposition flap was drawn incorporating the defect.    The area thus outlined was incised deep to adipose tissue with a #15 scalpel blade.  The skin margins were undermined to an appropriate distance in all directions utilizing iris scissors.
Muscle Hinge Flap Text: The defect edges were debeveled with a #15 scalpel blade.  Given the size, depth and location of the defect and the proximity to free margins a muscle hinge flap was deemed most appropriate.  Using a sterile surgical marker, an appropriate hinge flap was drawn incorporating the defect. The area thus outlined was incised with a #15 scalpel blade.  The skin margins were undermined to an appropriate distance in all directions utilizing iris scissors.
Mustarde Flap Text: The defect edges were debeveled with a #15 scalpel blade.  Given the size, depth and location of the defect and the proximity to free margins a Mustarde flap was deemed most appropriate. Using a sterile surgical marker, an appropriate flap was drawn incorporating the defect. The area thus outlined was incised with a #15 scalpel blade. The skin margins were undermined to an appropriate distance in all directions utilizing iris scissors. Following this, the designed flap was carried into the primary defect and sutured into place.
Nasal Turnover Hinge Flap Text: The defect edges were debeveled with a #15 scalpel blade.  Given the size, depth, location of the defect and the defect being full thickness a nasal turnover hinge flap was deemed most appropriate. Using a sterile surgical marker, an appropriate hinge flap was drawn incorporating the defect. The area thus outlined was incised with a #15 scalpel blade. The flap was designed to recreate the nasal mucosal lining and the alar rim. The skin margins were undermined to an appropriate distance in all directions utilizing iris scissors. Following this, the designed flap was carried over into the primary defect and sutured into place
Nasalis-Muscle-Based Myocutaneous Island Pedicle Flap Text: Using a #15 blade, an incision was made around the donor flap to the level of the nasalis muscle. Wide lateral undermining was then performed in both the subcutaneous plane above the nasalis muscle, and in a submuscular plane just above periosteum. This allowed the formation of a free nasalis muscle axial pedicle (based on the angular artery) which was still attached to the actual cutaneous flap, increasing its mobility and vascular viability. Hemostasis was obtained with pinpoint electrocoagulation. The flap was mobilized into position and the pivotal anchor points positioned and stabilized with buried interrupted sutures. Subcutaneous and dermal tissues were closed in a multilayered fashion with sutures. Tissue redundancies were excised, and the epidermal edges were apposed without significant tension and sutured with sutures.
Orbicularis Oris Muscle Flap Text: The defect edges were debeveled with a #15 scalpel blade.  Given that the defect affected the competency of the oral sphincter an orbicularis oris muscle flap was deemed most appropriate to restore this competency and normal muscle function.  Using a sterile surgical marker, an appropriate flap was drawn incorporating the defect. The area thus outlined was incised with a #15 scalpel blade. Following this, the designed flap was carried over into the primary defect and sutured into place.
Melolabial Transposition Flap Text: The defect edges were debeveled with a #15 scalpel blade.  Given the location of the defect and the proximity to free margins a melolabial flap was deemed most appropriate.  Using a sterile surgical marker, an appropriate melolabial transposition flap was drawn incorporating the defect.    The area thus outlined was incised deep to adipose tissue with a #15 scalpel blade.  The skin margins were undermined to an appropriate distance in all directions utilizing iris scissors.
Rhombic Flap Text: The defect edges were debeveled with a #15 scalpel blade.  Given the location of the defect and the proximity to free margins a rhombic flap was deemed most appropriate.  Using a sterile surgical marker, an appropriate rhombic flap was drawn incorporating the defect.    The area thus outlined was incised deep to adipose tissue with a #15 scalpel blade.  The skin margins were undermined to an appropriate distance in all directions utilizing iris scissors.
Rhomboid Transposition Flap Text: The defect edges were debeveled with a #15 scalpel blade. Given the location of the defect and the proximity to free margins a rhomboid transposition flap was deemed most appropriate. Using a sterile surgical marker, an appropriate rhomboid flap was drawn incorporating the defect. The area thus outlined was incised deep to adipose tissue with a #15 scalpel blade. The skin margins were undermined to an appropriate distance in all directions utilizing iris scissors. Following this, the designed flap was carried over into the primary defect and sutured into place.
Bi-Rhombic Flap Text: The defect edges were debeveled with a #15 scalpel blade.  Given the location of the defect and the proximity to free margins a bi-rhombic flap was deemed most appropriate.  Using a sterile surgical marker, an appropriate rhombic flap was drawn incorporating the defect. The area thus outlined was incised deep to adipose tissue with a #15 scalpel blade.  The skin margins were undermined to an appropriate distance in all directions utilizing iris scissors.
Helical Rim Advancement Flap Text: The defect edges were debeveled with a #15 blade scalpel.  Given the location of the defect and the proximity to free margins (helical rim) a double helical rim advancement flap was deemed most appropriate.  Using a sterile surgical marker, the appropriate advancement flaps were drawn incorporating the defect and placing the expected incisions between the helical rim and antihelix where possible.  The area thus outlined was incised through and through with a #15 scalpel blade.  With a skin hook and iris scissors, the flaps were gently and sharply undermined and freed up.
Bilateral Helical Rim Advancement Flap Text: The defect edges were debeveled with a #15 blade scalpel.  Given the location of the defect and the proximity to free margins (helical rim) a bilateral helical rim advancement flap was deemed most appropriate.  Using a sterile surgical marker, the appropriate advancement flaps were drawn incorporating the defect and placing the expected incisions between the helical rim and antihelix where possible.  The area thus outlined was incised through and through with a #15 scalpel blade.  With a skin hook and iris scissors, the flaps were gently and sharply undermined and freed up.
Ear Star Wedge Flap Text: The defect edges were debeveled with a #15 blade scalpel.  Given the location of the defect and the proximity to free margins (helical rim) an ear star wedge flap was deemed most appropriate.  Using a sterile surgical marker, the appropriate flap was drawn incorporating the defect and placing the expected incisions between the helical rim and antihelix where possible.  The area thus outlined was incised through and through with a #15 scalpel blade.
Banner Transposition Flap Text: The defect edges were debeveled with a #15 scalpel blade. Given the location of the defect and the proximity to free margins a Banner transposition flap was deemed most appropriate. Using a sterile surgical marker, an appropriate flap was drawn around the defect. The area thus outlined was incised deep to adipose tissue with a #15 scalpel blade. The skin margins were undermined to an appropriate distance in all directions utilizing iris scissors. Following this, the designed flap was carried into the primary defect and sutured into place.
Bilobed Flap Text: The defect edges were debeveled with a #15 scalpel blade.  Given the location of the defect and the proximity to free margins a bilobe flap was deemed most appropriate.  Using a sterile surgical marker, an appropriate bilobe flap drawn around the defect.    The area thus outlined was incised deep to adipose tissue with a #15 scalpel blade.  The skin margins were undermined to an appropriate distance in all directions utilizing iris scissors.
Bilobed Transposition Flap Text: The defect edges were debeveled with a #15 scalpel blade.  Given the location of the defect and the proximity to free margins a bilobed transposition flap was deemed most appropriate.  Using a sterile surgical marker, an appropriate bilobe flap drawn around the defect.    The area thus outlined was incised deep to adipose tissue with a #15 scalpel blade.  The skin margins were undermined to an appropriate distance in all directions utilizing iris scissors.
Trilobed Flap Text: The defect edges were debeveled with a #15 scalpel blade.  Given the location of the defect and the proximity to free margins a trilobed flap was deemed most appropriate.  Using a sterile surgical marker, an appropriate trilobed flap drawn around the defect.    The area thus outlined was incised deep to adipose tissue with a #15 scalpel blade.  The skin margins were undermined to an appropriate distance in all directions utilizing iris scissors.
Dorsal Nasal Flap Text: The defect edges were debeveled with a #15 scalpel blade.  Given the location of the defect and the proximity to free margins a dorsal nasal flap was deemed most appropriate.  Using a sterile surgical marker, an appropriate dorsal nasal flap was drawn around the defect.    The area thus outlined was incised deep to adipose tissue with a #15 scalpel blade.  The skin margins were undermined to an appropriate distance in all directions utilizing iris scissors.
Island Pedicle Flap Text: The defect edges were debeveled with a #15 scalpel blade.  Given the location of the defect, shape of the defect and the proximity to free margins an island pedicle advancement flap was deemed most appropriate.  Using a sterile surgical marker, an appropriate advancement flap was drawn incorporating the defect, outlining the appropriate donor tissue and placing the expected incisions within the relaxed skin tension lines where possible.    The area thus outlined was incised deep to adipose tissue with a #15 scalpel blade.  The skin margins were undermined to an appropriate distance in all directions around the primary defect and laterally outward around the island pedicle utilizing iris scissors.  There was minimal undermining beneath the pedicle flap.
Island Pedicle Flap With Canthal Suspension Text: The defect edges were debeveled with a #15 scalpel blade.  Given the location of the defect, shape of the defect and the proximity to free margins an island pedicle advancement flap was deemed most appropriate.  Using a sterile surgical marker, an appropriate advancement flap was drawn incorporating the defect, outlining the appropriate donor tissue and placing the expected incisions within the relaxed skin tension lines where possible. The area thus outlined was incised deep to adipose tissue with a #15 scalpel blade.  The skin margins were undermined to an appropriate distance in all directions around the primary defect and laterally outward around the island pedicle utilizing iris scissors.  There was minimal undermining beneath the pedicle flap. A suspension suture was placed in the canthal tendon to prevent tension and prevent ectropion.
Alar Island Pedicle Flap Text: The defect edges were debeveled with a #15 scalpel blade.  Given the location of the defect, shape of the defect and the proximity to the alar rim an island pedicle advancement flap was deemed most appropriate.  Using a sterile surgical marker, an appropriate advancement flap was drawn incorporating the defect, outlining the appropriate donor tissue and placing the expected incisions within the nasal ala running parallel to the alar rim. The area thus outlined was incised with a #15 scalpel blade.  The skin margins were undermined minimally to an appropriate distance in all directions around the primary defect and laterally outward around the island pedicle utilizing iris scissors.  There was minimal undermining beneath the pedicle flap.
Double Island Pedicle Flap Text: The defect edges were debeveled with a #15 scalpel blade.  Given the location of the defect, shape of the defect and the proximity to free margins a double island pedicle advancement flap was deemed most appropriate.  Using a sterile surgical marker, an appropriate advancement flap was drawn incorporating the defect, outlining the appropriate donor tissue and placing the expected incisions within the relaxed skin tension lines where possible.    The area thus outlined was incised deep to adipose tissue with a #15 scalpel blade.  The skin margins were undermined to an appropriate distance in all directions around the primary defect and laterally outward around the island pedicle utilizing iris scissors.  There was minimal undermining beneath the pedicle flap.
Island Pedicle Flap-Requiring Vessel Identification Text: The defect edges were debeveled with a #15 scalpel blade.  Given the location of the defect, shape of the defect and the proximity to free margins an island pedicle advancement flap was deemed most appropriate.  Using a sterile surgical marker, an appropriate advancement flap was drawn, based on the axial vessel mentioned above, incorporating the defect, outlining the appropriate donor tissue and placing the expected incisions within the relaxed skin tension lines where possible.    The area thus outlined was incised deep to adipose tissue with a #15 scalpel blade.  The skin margins were undermined to an appropriate distance in all directions around the primary defect and laterally outward around the island pedicle utilizing iris scissors.  There was minimal undermining beneath the pedicle flap.
Keystone Flap Text: The defect edges were debeveled with a #15 scalpel blade.  Given the location of the defect, shape of the defect a keystone flap was deemed most appropriate.  Using a sterile surgical marker, an appropriate keystone flap was drawn incorporating the defect, outlining the appropriate donor tissue and placing the expected incisions within the relaxed skin tension lines where possible. The area thus outlined was incised deep to adipose tissue with a #15 scalpel blade.  The skin margins were undermined to an appropriate distance in all directions around the primary defect and laterally outward around the flap utilizing iris scissors.
O-T Plasty Text: The defect edges were debeveled with a #15 scalpel blade.  Given the location of the defect, shape of the defect and the proximity to free margins an O-T plasty was deemed most appropriate.  Using a sterile surgical marker, an appropriate O-T plasty was drawn incorporating the defect and placing the expected incisions within the relaxed skin tension lines where possible.    The area thus outlined was incised deep to adipose tissue with a #15 scalpel blade.  The skin margins were undermined to an appropriate distance in all directions utilizing iris scissors.
O-Z Plasty Text: The defect edges were debeveled with a #15 scalpel blade.  Given the location of the defect, shape of the defect and the proximity to free margins an O-Z plasty (double transposition flap) was deemed most appropriate.  Using a sterile surgical marker, the appropriate transposition flaps were drawn incorporating the defect and placing the expected incisions within the relaxed skin tension lines where possible.    The area thus outlined was incised deep to adipose tissue with a #15 scalpel blade.  The skin margins were undermined to an appropriate distance in all directions utilizing iris scissors.  Hemostasis was achieved with electrocautery.  The flaps were then transposed into place, one clockwise and the other counterclockwise, and anchored with interrupted buried subcutaneous sutures.
Double O-Z Plasty Text: The defect edges were debeveled with a #15 scalpel blade. Given the location of the defect, shape of the defect and the proximity to free margins a Double O-Z plasty (double transposition flap) was deemed most appropriate. Using a sterile surgical marker, the appropriate transposition flaps were drawn incorporating the defect and placing the expected incisions within the relaxed skin tension lines where possible. The area thus outlined was incised deep to adipose tissue with a #15 scalpel blade. The skin margins were undermined to an appropriate distance in all directions utilizing iris scissors. Hemostasis was achieved with electrocautery. The flaps were then transposed and carried over into place, one clockwise and the other counterclockwise, and anchored with interrupted buried subcutaneous sutures.
V-Y Plasty Text: The defect edges were debeveled with a #15 scalpel blade.  Given the location of the defect, shape of the defect and the proximity to free margins an V-Y advancement flap was deemed most appropriate.  Using a sterile surgical marker, an appropriate advancement flap was drawn incorporating the defect and placing the expected incisions within the relaxed skin tension lines where possible.    The area thus outlined was incised deep to adipose tissue with a #15 scalpel blade.  The skin margins were undermined to an appropriate distance in all directions utilizing iris scissors.
H Plasty Text: Given the location of the defect, shape of the defect and the proximity to free margins a H-plasty was deemed most appropriate for repair.  Using a sterile surgical marker, the appropriate advancement arms of the H-plasty were drawn incorporating the defect and placing the expected incisions within the relaxed skin tension lines where possible. The area thus outlined was incised deep to adipose tissue with a #15 scalpel blade. The skin margins were undermined to an appropriate distance in all directions utilizing iris scissors.  The opposing advancement arms were then advanced into place in opposite direction and anchored with interrupted buried subcutaneous sutures.
W Plasty Text: The lesion was extirpated to the level of the fat with a #15 scalpel blade.  Given the location of the defect, shape of the defect and the proximity to free margins a W-plasty was deemed most appropriate for repair.  Using a sterile surgical marker, the appropriate transposition arms of the W-plasty were drawn incorporating the defect and placing the expected incisions within the relaxed skin tension lines where possible.    The area thus outlined was incised deep to adipose tissue with a #15 scalpel blade.  The skin margins were undermined to an appropriate distance in all directions utilizing iris scissors.  The opposing transposition arms were then transposed into place in opposite direction and anchored with interrupted buried subcutaneous sutures.
Z Plasty Text: The lesion was extirpated to the level of the fat with a #15 scalpel blade.  Given the location of the defect, shape of the defect and the proximity to free margins a Z-plasty was deemed most appropriate for repair.  Using a sterile surgical marker, the appropriate transposition arms of the Z-plasty were drawn incorporating the defect and placing the expected incisions within the relaxed skin tension lines where possible.    The area thus outlined was incised deep to adipose tissue with a #15 scalpel blade.  The skin margins were undermined to an appropriate distance in all directions utilizing iris scissors.  The opposing transposition arms were then transposed into place in opposite direction and anchored with interrupted buried subcutaneous sutures.
Double Z Plasty Text: The lesion was extirpated to the level of the fat with a #15 scalpel blade. Given the location of the defect, shape of the defect and the proximity to free margins a double Z-plasty was deemed most appropriate for repair. Using a sterile surgical marker, the appropriate transposition arms of the double Z-plasty were drawn incorporating the defect and placing the expected incisions within the relaxed skin tension lines where possible. The area thus outlined was incised deep to adipose tissue with a #15 scalpel blade. The skin margins were undermined to an appropriate distance in all directions utilizing iris scissors. The opposing transposition arms were then transposed and carried over into place in opposite direction and anchored with interrupted buried subcutaneous sutures.
Zygomaticofacial Flap Text: Given the location of the defect, shape of the defect and the proximity to free margins a zygomaticofacial flap was deemed most appropriate for repair. Using a sterile surgical marker, the appropriate flap was drawn incorporating the defect and placing the expected incisions within the relaxed skin tension lines where possible. The area thus outlined was incised deep to adipose tissue with a #15 scalpel blade with preservation of a vascular pedicle.  The skin margins were undermined to an appropriate distance in all directions utilizing iris scissors. The flap was then carried over into the defect and anchored with interrupted buried subcutaneous sutures.
Cheek Interpolation Flap Text: A decision was made to reconstruct the defect utilizing an interpolation axial flap and a staged reconstruction.  A telfa template was made of the defect.  This telfa template was then used to outline the Cheek Interpolation flap.  The donor area for the pedicle flap was then injected with anesthesia.  The flap was excised through the skin and subcutaneous tissue down to the layer of the underlying musculature.  The interpolation flap was carefully excised within this deep plane to maintain its blood supply.  The edges of the donor site were undermined.   The donor site was closed in a primary fashion.  The pedicle was then rotated into position and sutured.  Once the tube was sutured into place, adequate blood supply was confirmed with blanching and refill.  The pedicle was then wrapped with xeroform gauze and dressed appropriately with a telfa and gauze bandage to ensure continued blood supply and protect the attached pedicle.
Cheek-To-Nose Interpolation Flap Text: A decision was made to reconstruct the defect utilizing an interpolation axial flap and a staged reconstruction.  A telfa template was made of the defect.  This telfa template was then used to outline the Cheek-To-Nose Interpolation flap.  The donor area for the pedicle flap was then injected with anesthesia.  The flap was excised through the skin and subcutaneous tissue down to the layer of the underlying musculature.  The interpolation flap was carefully excised within this deep plane to maintain its blood supply.  The edges of the donor site were undermined.   The donor site was closed in a primary fashion.  The pedicle was then rotated into position and sutured.  Once the tube was sutured into place, adequate blood supply was confirmed with blanching and refill.  The pedicle was then wrapped with xeroform gauze and dressed appropriately with a telfa and gauze bandage to ensure continued blood supply and protect the attached pedicle.
Interpolation Flap Text: A decision was made to reconstruct the defect utilizing an interpolation axial flap and a staged reconstruction.  A telfa template was made of the defect.  This telfa template was then used to outline the interpolation flap.  The donor area for the pedicle flap was then injected with anesthesia.  The flap was excised through the skin and subcutaneous tissue down to the layer of the underlying musculature.  The interpolation flap was carefully excised within this deep plane to maintain its blood supply.  The edges of the donor site were undermined.   The donor site was closed in a primary fashion.  The pedicle was then rotated into position and sutured.  Once the tube was sutured into place, adequate blood supply was confirmed with blanching and refill.  The pedicle was then wrapped with xeroform gauze and dressed appropriately with a telfa and gauze bandage to ensure continued blood supply and protect the attached pedicle.
Melolabial Interpolation Flap Text: A decision was made to reconstruct the defect utilizing an interpolation axial flap and a staged reconstruction.  A telfa template was made of the defect.  This telfa template was then used to outline the melolabial interpolation flap.  The donor area for the pedicle flap was then injected with anesthesia.  The flap was excised through the skin and subcutaneous tissue down to the layer of the underlying musculature.  The pedicle flap was carefully excised within this deep plane to maintain its blood supply.  The edges of the donor site were undermined.   The donor site was closed in a primary fashion.  The pedicle was then rotated into position and sutured.  Once the tube was sutured into place, adequate blood supply was confirmed with blanching and refill.  The pedicle was then wrapped with xeroform gauze and dressed appropriately with a telfa and gauze bandage to ensure continued blood supply and protect the attached pedicle.
Mastoid Interpolation Flap Text: A decision was made to reconstruct the defect utilizing an interpolation axial flap and a staged reconstruction.  A telfa template was made of the defect.  This telfa template was then used to outline the mastoid interpolation flap.  The donor area for the pedicle flap was then injected with anesthesia.  The flap was excised through the skin and subcutaneous tissue down to the layer of the underlying musculature.  The pedicle flap was carefully excised within this deep plane to maintain its blood supply.  The edges of the donor site were undermined.   The donor site was closed in a primary fashion.  The pedicle was then rotated into position and sutured.  Once the tube was sutured into place, adequate blood supply was confirmed with blanching and refill.  The pedicle was then wrapped with xeroform gauze and dressed appropriately with a telfa and gauze bandage to ensure continued blood supply and protect the attached pedicle.
Posterior Auricular Interpolation Flap Text: A decision was made to reconstruct the defect utilizing an interpolation axial flap and a staged reconstruction.  A telfa template was made of the defect.  This telfa template was then used to outline the posterior auricular interpolation flap.  The donor area for the pedicle flap was then injected with anesthesia.  The flap was excised through the skin and subcutaneous tissue down to the layer of the underlying musculature.  The pedicle flap was carefully excised within this deep plane to maintain its blood supply.  The edges of the donor site were undermined.   The donor site was closed in a primary fashion.  The pedicle was then rotated into position and sutured.  Once the tube was sutured into place, adequate blood supply was confirmed with blanching and refill.  The pedicle was then wrapped with xeroform gauze and dressed appropriately with a telfa and gauze bandage to ensure continued blood supply and protect the attached pedicle.
Paramedian Forehead Flap Text: A decision was made to reconstruct the defect utilizing an interpolation axial flap and a staged reconstruction.  A telfa template was made of the defect.  This telfa template was then used to outline the paramedian forehead pedicle flap.  The donor area for the pedicle flap was then injected with anesthesia.  The flap was excised through the skin and subcutaneous tissue down to the layer of the underlying musculature.  The pedicle flap was carefully excised within this deep plane to maintain its blood supply.  The edges of the donor site were undermined.   The donor site was closed in a primary fashion.  The pedicle was then rotated into position and sutured.  Once the tube was sutured into place, adequate blood supply was confirmed with blanching and refill.  The pedicle was then wrapped with xeroform gauze and dressed appropriately with a telfa and gauze bandage to ensure continued blood supply and protect the attached pedicle.
Abbe Flap (Upper To Lower Lip) Text: The defect of the lower lip was assessed and measured.  Given the location and size of the defect, an Abbe flap was deemed most appropriate. Using a sterile surgical marker, an appropriate Abbe flap was measured and drawn on the upper lip. Local anesthesia was then infiltrated.  A scalpel was then used to incise the upper lip through and through the skin, vermilion, muscle and mucosa, leaving the flap pedicled on the opposite side.  The flap was then rotated and transferred to the lower lip defect.  The flap was then sutured into place with a three layer technique, closing the orbicularis oris muscle layer with subcutaneous buried sutures, followed by a mucosal layer and an epidermal layer.
Abbe Flap (Lower To Upper Lip) Text: The defect of the upper lip was assessed and measured.  Given the location and size of the defect, an Abbe flap was deemed most appropriate. Using a sterile surgical marker, an appropriate Abbe flap was measured and drawn on the lower lip. Local anesthesia was then infiltrated. A scalpel was then used to incise the upper lip through and through the skin, vermilion, muscle and mucosa, leaving the flap pedicled on the opposite side.  The flap was then rotated and transferred to the lower lip defect.  The flap was then sutured into place with a three layer technique, closing the orbicularis oris muscle layer with subcutaneous buried sutures, followed by a mucosal layer and an epidermal layer.
Estlander Flap (Upper To Lower Lip) Text: The defect of the lower lip was assessed and measured.  Given the location and size of the defect, an Estlander flap was deemed most appropriate. Using a sterile surgical marker, an appropriate Estlander flap was measured and drawn on the upper lip. Local anesthesia was then infiltrated. A scalpel was then used to incise the lateral aspect of the flap, through skin, muscle and mucosa, leaving the flap pedicled medially.  The flap was then rotated and positioned to fill the lower lip defect.  The flap was then sutured into place with a three layer technique, closing the orbicularis oris muscle layer with subcutaneous buried sutures, followed by a mucosal layer and an epidermal layer.
Lip Wedge Excision Repair Text: Given the location of the defect and the proximity to free margins a full thickness wedge repair was deemed most appropriate.  Using a sterile surgical marker, the appropriate repair was drawn incorporating the defect and placing the expected incisions perpendicular to the vermilion border.  The vermilion border was also meticulously outlined to ensure appropriate reapproximation during the repair.  The area thus outlined was incised through and through with a #15 scalpel blade.  The muscularis and dermis were reaproximated with deep sutures following hemostasis. Care was taken to realign the vermilion border before proceeding with the superficial closure.  Once the vermilion was realigned the superfical and mucosal closure was finished.
Ftsg Text: The defect edges were debeveled with a #15 scalpel blade.  Given the location of the defect, shape of the defect and the proximity to free margins a full thickness skin graft was deemed most appropriate.  Using a sterile surgical marker, the primary defect shape was transferred to the donor site. The area thus outlined was incised deep to adipose tissue with a #15 scalpel blade.  The harvested graft was then trimmed of adipose tissue until only dermis and epidermis was left.  The skin margins of the secondary defect were undermined to an appropriate distance in all directions utilizing iris scissors.  The secondary defect was closed with interrupted buried subcutaneous sutures.  The skin edges were then re-apposed with running  sutures.  The skin graft was then placed in the primary defect and oriented appropriately.
Split-Thickness Skin Graft Text: The defect edges were debeveled with a #15 scalpel blade.  Given the location of the defect, shape of the defect and the proximity to free margins a split thickness skin graft was deemed most appropriate.  Using a sterile surgical marker, the primary defect shape was transferred to the donor site. The split thickness graft was then harvested.  The skin graft was then placed in the primary defect and oriented appropriately.
Pinch Graft Text: The defect edges were debeveled with a #15 scalpel blade. Given the location of the defect, shape of the defect and the proximity to free margins a pinch graft was deemed most appropriate. Using a sterile surgical marker, the primary defect shape was transferred to the donor site. The area thus outlined was incised deep to adipose tissue with a #15 scalpel blade.  The harvested graft was then trimmed of adipose tissue until only dermis and epidermis was left. The skin margins of the secondary defect were undermined to an appropriate distance in all directions utilizing iris scissors.  The secondary defect was closed with interrupted buried subcutaneous sutures.  The skin edges were then re-apposed with running  sutures.  The skin graft was then placed in the primary defect and oriented appropriately.
Burow's Graft Text: The defect edges were debeveled with a #15 scalpel blade. Given the location of the defect, shape of the defect, the proximity to free margins and the presence of a standing cone deformity a Burow's skin graft was deemed most appropriate. The standing cone was removed and this tissue was then trimmed to the shape of the primary defect. The adipose tissue was also removed until only dermis and epidermis were left.  The skin margins of the secondary defect were undermined to an appropriate distance in all directions utilizing iris scissors.  The secondary defect was closed with interrupted buried subcutaneous sutures.  The skin edges were then re-apposed with running  sutures.  The skin graft was then placed in the primary defect and oriented appropriately.
Cartilage Graft Text: The defect edges were debeveled with a #15 scalpel blade.  Given the location of the defect, shape of the defect, the fact the defect involved a full thickness cartilage defect a cartilage graft was deemed most appropriate.  An appropriate donor site was identified, cleansed, and anesthetized. The cartilage graft was then harvested and transferred to the recipient site, oriented appropriately and then sutured into place.  The secondary defect was then repaired using a primary closure.
Composite Graft Text: The defect edges were debeveled with a #15 scalpel blade.  Given the location of the defect, shape of the defect, the proximity to free margins and the fact the defect was full thickness a composite graft was deemed most appropriate.  The defect was outline and then transferred to the donor site.  A full thickness graft was then excised from the donor site. The graft was then placed in the primary defect, oriented appropriately and then sutured into place.  The secondary defect was then repaired using a primary closure.
Epidermal Autograft Text: The defect edges were debeveled with a #15 scalpel blade.  Given the location of the defect, shape of the defect and the proximity to free margins an epidermal autograft was deemed most appropriate.  Using a sterile surgical marker, the primary defect shape was transferred to the donor site. The epidermal graft was then harvested.  The skin graft was then placed in the primary defect and oriented appropriately.
Dermal Autograft Text: The defect edges were debeveled with a #15 scalpel blade.  Given the location of the defect, shape of the defect and the proximity to free margins a dermal autograft was deemed most appropriate.  Using a sterile surgical marker, the primary defect shape was transferred to the donor site. The area thus outlined was incised deep to adipose tissue with a #15 scalpel blade.  The harvested graft was then trimmed of adipose and epidermal tissue until only dermis was left.  The skin graft was then placed in the primary defect and oriented appropriately.
Skin Substitute Text: The defect edges were debeveled with a #15 scalpel blade.  Given the location of the defect, shape of the defect and the proximity to free margins a skin substitute graft was deemed most appropriate.  The graft material was trimmed to fit the size of the defect. The graft was then placed in the primary defect and oriented appropriately.
Tissue Cultured Epidermal Autograft Text: The defect edges were debeveled with a #15 scalpel blade.  Given the location of the defect, shape of the defect and the proximity to free margins a tissue cultured epidermal autograft was deemed most appropriate.  The graft was then trimmed to fit the size of the defect.  The graft was then placed in the primary defect and oriented appropriately.
Xenograft Text: The defect edges were debeveled with a #15 scalpel blade.  Given the location of the defect, shape of the defect and the proximity to free margins a xenograft was deemed most appropriate.  The graft was then trimmed to fit the size of the defect.  The graft was then placed in the primary defect and oriented appropriately.
Purse String (Intermediate) Text: Given the location of the defect and the characteristics of the surrounding skin a purse string intermediate closure was deemed most appropriate.  Undermining was performed circumfirentially around the surgical defect.  A purse string suture was then placed and tightened.
Purse String (Simple) Text: Given the location of the defect and the characteristics of the surrounding skin a purse string simple closure was deemed most appropriate.  Undermining was performed circumferentially around the surgical defect.  A purse string suture was then placed and tightened.
Partial Purse String (Intermediate) Text: Given the location of the defect and the characteristics of the surrounding skin an intermediate purse string closure was deemed most appropriate.  Undermining was performed circumferentially around the surgical defect.  A purse string suture was then placed and tightened. Wound tension of the circular defect prevented complete closure of the wound.
Partial Purse String (Simple) Text: Given the location of the defect and the characteristics of the surrounding skin a simple purse string closure was deemed most appropriate.  Undermining was performed circumferentially around the surgical defect.  A purse string suture was then placed and tightened. Wound tension of the circular defect prevented complete closure of the wound.
Complex Repair And Single Advancement Flap Text: The defect edges were debeveled with a #15 scalpel blade.  The primary defect was closed partially with a complex linear closure.  Given the location of the remaining defect, shape of the defect and the proximity to free margins a single advancement flap was deemed most appropriate for complete closure of the defect.  Using a sterile surgical marker, an appropriate advancement flap was drawn incorporating the defect and placing the expected incisions within the relaxed skin tension lines where possible.    The area thus outlined was incised deep to adipose tissue with a #15 scalpel blade.  The skin margins were undermined to an appropriate distance in all directions utilizing iris scissors.
Complex Repair And Double Advancement Flap Text: The defect edges were debeveled with a #15 scalpel blade.  The primary defect was closed partially with a complex linear closure.  Given the location of the remaining defect, shape of the defect and the proximity to free margins a double advancement flap was deemed most appropriate for complete closure of the defect.  Using a sterile surgical marker, an appropriate advancement flap was drawn incorporating the defect and placing the expected incisions within the relaxed skin tension lines where possible.    The area thus outlined was incised deep to adipose tissue with a #15 scalpel blade.  The skin margins were undermined to an appropriate distance in all directions utilizing iris scissors.
Complex Repair And Modified Advancement Flap Text: The defect edges were debeveled with a #15 scalpel blade.  The primary defect was closed partially with a complex linear closure.  Given the location of the remaining defect, shape of the defect and the proximity to free margins a modified advancement flap was deemed most appropriate for complete closure of the defect.  Using a sterile surgical marker, an appropriate advancement flap was drawn incorporating the defect and placing the expected incisions within the relaxed skin tension lines where possible.    The area thus outlined was incised deep to adipose tissue with a #15 scalpel blade.  The skin margins were undermined to an appropriate distance in all directions utilizing iris scissors.
Complex Repair And A-T Advancement Flap Text: The defect edges were debeveled with a #15 scalpel blade.  The primary defect was closed partially with a complex linear closure.  Given the location of the remaining defect, shape of the defect and the proximity to free margins an A-T advancement flap was deemed most appropriate for complete closure of the defect.  Using a sterile surgical marker, an appropriate advancement flap was drawn incorporating the defect and placing the expected incisions within the relaxed skin tension lines where possible.    The area thus outlined was incised deep to adipose tissue with a #15 scalpel blade.  The skin margins were undermined to an appropriate distance in all directions utilizing iris scissors.
Complex Repair And O-T Advancement Flap Text: The defect edges were debeveled with a #15 scalpel blade.  The primary defect was closed partially with a complex linear closure.  Given the location of the remaining defect, shape of the defect and the proximity to free margins an O-T advancement flap was deemed most appropriate for complete closure of the defect.  Using a sterile surgical marker, an appropriate advancement flap was drawn incorporating the defect and placing the expected incisions within the relaxed skin tension lines where possible.    The area thus outlined was incised deep to adipose tissue with a #15 scalpel blade.  The skin margins were undermined to an appropriate distance in all directions utilizing iris scissors.
Complex Repair And O-L Flap Text: The defect edges were debeveled with a #15 scalpel blade.  The primary defect was closed partially with a complex linear closure.  Given the location of the remaining defect, shape of the defect and the proximity to free margins an O-L flap was deemed most appropriate for complete closure of the defect.  Using a sterile surgical marker, an appropriate flap was drawn incorporating the defect and placing the expected incisions within the relaxed skin tension lines where possible.    The area thus outlined was incised deep to adipose tissue with a #15 scalpel blade.  The skin margins were undermined to an appropriate distance in all directions utilizing iris scissors.
Complex Repair And Bilobe Flap Text: The defect edges were debeveled with a #15 scalpel blade.  The primary defect was closed partially with a complex linear closure.  Given the location of the remaining defect, shape of the defect and the proximity to free margins a bilobe flap was deemed most appropriate for complete closure of the defect.  Using a sterile surgical marker, an appropriate advancement flap was drawn incorporating the defect and placing the expected incisions within the relaxed skin tension lines where possible.    The area thus outlined was incised deep to adipose tissue with a #15 scalpel blade.  The skin margins were undermined to an appropriate distance in all directions utilizing iris scissors.
Complex Repair And Melolabial Flap Text: The defect edges were debeveled with a #15 scalpel blade.  The primary defect was closed partially with a complex linear closure.  Given the location of the remaining defect, shape of the defect and the proximity to free margins a melolabial flap was deemed most appropriate for complete closure of the defect.  Using a sterile surgical marker, an appropriate advancement flap was drawn incorporating the defect and placing the expected incisions within the relaxed skin tension lines where possible.    The area thus outlined was incised deep to adipose tissue with a #15 scalpel blade.  The skin margins were undermined to an appropriate distance in all directions utilizing iris scissors.
Complex Repair And Rotation Flap Text: The defect edges were debeveled with a #15 scalpel blade.  The primary defect was closed partially with a complex linear closure.  Given the location of the remaining defect, shape of the defect and the proximity to free margins a rotation flap was deemed most appropriate for complete closure of the defect.  Using a sterile surgical marker, an appropriate advancement flap was drawn incorporating the defect and placing the expected incisions within the relaxed skin tension lines where possible.    The area thus outlined was incised deep to adipose tissue with a #15 scalpel blade.  The skin margins were undermined to an appropriate distance in all directions utilizing iris scissors.
Complex Repair And Rhombic Flap Text: The defect edges were debeveled with a #15 scalpel blade.  The primary defect was closed partially with a complex linear closure.  Given the location of the remaining defect, shape of the defect and the proximity to free margins a rhombic flap was deemed most appropriate for complete closure of the defect.  Using a sterile surgical marker, an appropriate advancement flap was drawn incorporating the defect and placing the expected incisions within the relaxed skin tension lines where possible.    The area thus outlined was incised deep to adipose tissue with a #15 scalpel blade.  The skin margins were undermined to an appropriate distance in all directions utilizing iris scissors.
Complex Repair And Transposition Flap Text: The defect edges were debeveled with a #15 scalpel blade.  The primary defect was closed partially with a complex linear closure.  Given the location of the remaining defect, shape of the defect and the proximity to free margins a transposition flap was deemed most appropriate for complete closure of the defect.  Using a sterile surgical marker, an appropriate advancement flap was drawn incorporating the defect and placing the expected incisions within the relaxed skin tension lines where possible.    The area thus outlined was incised deep to adipose tissue with a #15 scalpel blade.  The skin margins were undermined to an appropriate distance in all directions utilizing iris scissors.
Complex Repair And V-Y Plasty Text: The defect edges were debeveled with a #15 scalpel blade.  The primary defect was closed partially with a complex linear closure.  Given the location of the remaining defect, shape of the defect and the proximity to free margins a V-Y plasty was deemed most appropriate for complete closure of the defect.  Using a sterile surgical marker, an appropriate advancement flap was drawn incorporating the defect and placing the expected incisions within the relaxed skin tension lines where possible.    The area thus outlined was incised deep to adipose tissue with a #15 scalpel blade.  The skin margins were undermined to an appropriate distance in all directions utilizing iris scissors.
Complex Repair And M Plasty Text: The defect edges were debeveled with a #15 scalpel blade.  The primary defect was closed partially with a complex linear closure.  Given the location of the remaining defect, shape of the defect and the proximity to free margins an M plasty was deemed most appropriate for complete closure of the defect.  Using a sterile surgical marker, an appropriate advancement flap was drawn incorporating the defect and placing the expected incisions within the relaxed skin tension lines where possible.    The area thus outlined was incised deep to adipose tissue with a #15 scalpel blade.  The skin margins were undermined to an appropriate distance in all directions utilizing iris scissors.
Complex Repair And Double M Plasty Text: The defect edges were debeveled with a #15 scalpel blade.  The primary defect was closed partially with a complex linear closure.  Given the location of the remaining defect, shape of the defect and the proximity to free margins a double M plasty was deemed most appropriate for complete closure of the defect.  Using a sterile surgical marker, an appropriate advancement flap was drawn incorporating the defect and placing the expected incisions within the relaxed skin tension lines where possible.    The area thus outlined was incised deep to adipose tissue with a #15 scalpel blade.  The skin margins were undermined to an appropriate distance in all directions utilizing iris scissors.
Complex Repair And W Plasty Text: The defect edges were debeveled with a #15 scalpel blade.  The primary defect was closed partially with a complex linear closure.  Given the location of the remaining defect, shape of the defect and the proximity to free margins a W plasty was deemed most appropriate for complete closure of the defect.  Using a sterile surgical marker, an appropriate advancement flap was drawn incorporating the defect and placing the expected incisions within the relaxed skin tension lines where possible.    The area thus outlined was incised deep to adipose tissue with a #15 scalpel blade.  The skin margins were undermined to an appropriate distance in all directions utilizing iris scissors.
Complex Repair And Z Plasty Text: The defect edges were debeveled with a #15 scalpel blade.  The primary defect was closed partially with a complex linear closure.  Given the location of the remaining defect, shape of the defect and the proximity to free margins a Z plasty was deemed most appropriate for complete closure of the defect.  Using a sterile surgical marker, an appropriate advancement flap was drawn incorporating the defect and placing the expected incisions within the relaxed skin tension lines where possible.    The area thus outlined was incised deep to adipose tissue with a #15 scalpel blade.  The skin margins were undermined to an appropriate distance in all directions utilizing iris scissors.
Complex Repair And Dorsal Nasal Flap Text: The defect edges were debeveled with a #15 scalpel blade.  The primary defect was closed partially with a complex linear closure.  Given the location of the remaining defect, shape of the defect and the proximity to free margins a dorsal nasal flap was deemed most appropriate for complete closure of the defect.  Using a sterile surgical marker, an appropriate flap was drawn incorporating the defect and placing the expected incisions within the relaxed skin tension lines where possible.    The area thus outlined was incised deep to adipose tissue with a #15 scalpel blade.  The skin margins were undermined to an appropriate distance in all directions utilizing iris scissors.
Complex Repair And Ftsg Text: The defect edges were debeveled with a #15 scalpel blade.  The primary defect was closed partially with a complex linear closure.  Given the location of the defect, shape of the defect and the proximity to free margins a full thickness skin graft was deemed most appropriate to repair the remaining defect.  The graft was trimmed to fit the size of the remaining defect.  The graft was then placed in the primary defect, oriented appropriately, and sutured into place.
Complex Repair And Burow's Graft Text: The defect edges were debeveled with a #15 scalpel blade.  The primary defect was closed partially with a complex linear closure.  Given the location of the defect, shape of the defect, the proximity to free margins and the presence of a standing cone deformity a Burow's graft was deemed most appropriate to repair the remaining defect.  The graft was trimmed to fit the size of the remaining defect.  The graft was then placed in the primary defect, oriented appropriately, and sutured into place.
Complex Repair And Split-Thickness Skin Graft Text: The defect edges were debeveled with a #15 scalpel blade.  The primary defect was closed partially with a complex linear closure.  Given the location of the defect, shape of the defect and the proximity to free margins a split thickness skin graft was deemed most appropriate to repair the remaining defect.  The graft was trimmed to fit the size of the remaining defect.  The graft was then placed in the primary defect, oriented appropriately, and sutured into place.
Complex Repair And Epidermal Autograft Text: The defect edges were debeveled with a #15 scalpel blade.  The primary defect was closed partially with a complex linear closure.  Given the location of the defect, shape of the defect and the proximity to free margins an epidermal autograft was deemed most appropriate to repair the remaining defect.  The graft was trimmed to fit the size of the remaining defect.  The graft was then placed in the primary defect, oriented appropriately, and sutured into place.
Complex Repair And Dermal Autograft Text: The defect edges were debeveled with a #15 scalpel blade.  The primary defect was closed partially with a complex linear closure.  Given the location of the defect, shape of the defect and the proximity to free margins an dermal autograft was deemed most appropriate to repair the remaining defect.  The graft was trimmed to fit the size of the remaining defect.  The graft was then placed in the primary defect, oriented appropriately, and sutured into place.
Complex Repair And Tissue Cultured Epidermal Autograft Text: The defect edges were debeveled with a #15 scalpel blade.  The primary defect was closed partially with a complex linear closure.  Given the location of the defect, shape of the defect and the proximity to free margins an tissue cultured epidermal autograft was deemed most appropriate to repair the remaining defect.  The graft was trimmed to fit the size of the remaining defect.  The graft was then placed in the primary defect, oriented appropriately, and sutured into place.
Complex Repair And Xenograft Text: The defect edges were debeveled with a #15 scalpel blade.  The primary defect was closed partially with a complex linear closure.  Given the location of the defect, shape of the defect and the proximity to free margins a xenograft was deemed most appropriate to repair the remaining defect.  The graft was trimmed to fit the size of the remaining defect.  The graft was then placed in the primary defect, oriented appropriately, and sutured into place.
Complex Repair And Skin Substitute Graft Text: The defect edges were debeveled with a #15 scalpel blade.  The primary defect was closed partially with a complex linear closure.  Given the location of the remaining defect, shape of the defect and the proximity to free margins a skin substitute graft was deemed most appropriate to repair the remaining defect.  The graft was trimmed to fit the size of the remaining defect.  The graft was then placed in the primary defect, oriented appropriately, and sutured into place.
Path Notes (To The Dermatopathologist): Please check margins.
Consent was obtained from the patient. The risks and benefits to therapy were discussed in detail. Specifically, the risks of infection, scarring, bleeding, prolonged wound healing, incomplete removal, allergy to anesthesia, nerve injury and recurrence were addressed. Prior to the procedure, the treatment site was clearly identified and confirmed by the patient. All components of Universal Protocol/PAUSE Rule completed.
Post-Care Instructions: I reviewed with the patient in detail post-care instructions:\\n1. Apply bacitracin over the steri-strips.  \\n2. Cut non-stick pad (Telfa) to cover the steri-strips\\n3. Apply tape (hypafix) over the non-stick pad\\n4. Change once per day for 5 days\\n5. Shower with bandage on, change bandage after shower\\n\\nPatient is not to engage in any heavy lifting, exercise, hot tub, or swimming for the next 14 days. Should the patient develop any fevers, chills, bleeding, severe pain patient will contact the office immediately.
Home Suture Removal Text: Patient was provided a home suture removal kit and will remove their sutures at home.  If they have any questions or difficulties they will call the office.
Where Do You Want The Question To Include Opioid Counseling Located?: Case Summary Tab
Information: Selecting Yes will display possible errors in your note based on the variables you have selected. This validation is only offered as a suggestion for you. PLEASE NOTE THAT THE VALIDATION TEXT WILL BE REMOVED WHEN YOU FINALIZE YOUR NOTE. IF YOU WANT TO FAX A PRELIMINARY NOTE YOU WILL NEED TO TOGGLE THIS TO 'NO' IF YOU DO NOT WANT IT IN YOUR FAXED NOTE.

## 2023-10-30 PROBLEM — R19.7 DIARRHEA: Status: ACTIVE | Noted: 2023-10-30

## 2023-11-29 ENCOUNTER — PATIENT MESSAGE (OUTPATIENT)
Dept: HEALTH INFORMATION MANAGEMENT | Facility: OTHER | Age: 88
End: 2023-11-29

## 2024-01-23 PROBLEM — R19.5 LOOSE STOOLS: Status: ACTIVE | Noted: 2023-10-30

## 2024-01-23 PROBLEM — R05.8 DRY COUGH: Status: ACTIVE | Noted: 2024-01-23

## 2024-01-23 PROBLEM — R53.81 PHYSICAL DECONDITIONING: Status: ACTIVE | Noted: 2023-07-13

## 2024-01-23 PROBLEM — Z71.89 ADVANCED CARE PLANNING/COUNSELING DISCUSSION: Status: ACTIVE | Noted: 2024-01-23

## 2024-03-01 PROBLEM — R21 RASH OF FACE: Status: RESOLVED | Noted: 2023-07-13 | Resolved: 2024-03-01

## 2024-03-01 PROBLEM — Z91.81 AT RISK FOR FALLS: Status: ACTIVE | Noted: 2024-03-01

## 2024-06-13 ENCOUNTER — APPOINTMENT (RX ONLY)
Dept: URBAN - METROPOLITAN AREA CLINIC 4 | Facility: CLINIC | Age: 89
Setting detail: DERMATOLOGY
End: 2024-06-13

## 2024-06-13 DIAGNOSIS — L82.1 OTHER SEBORRHEIC KERATOSIS: ICD-10-CM

## 2024-06-13 DIAGNOSIS — Z85.828 PERSONAL HISTORY OF OTHER MALIGNANT NEOPLASM OF SKIN: ICD-10-CM | Status: STABLE

## 2024-06-13 PROCEDURE — ? ADDITIONAL NOTES

## 2024-06-13 PROCEDURE — ? COUNSELING

## 2024-06-13 PROCEDURE — 99212 OFFICE O/P EST SF 10 MIN: CPT

## 2024-06-13 ASSESSMENT — LOCATION ZONE DERM
LOCATION ZONE: NECK
LOCATION ZONE: SCALP
LOCATION ZONE: LEG
LOCATION ZONE: FACE
LOCATION ZONE: TRUNK

## 2024-06-13 ASSESSMENT — LOCATION SIMPLE DESCRIPTION DERM
LOCATION SIMPLE: ABDOMEN
LOCATION SIMPLE: RIGHT PRETIBIAL REGION
LOCATION SIMPLE: LEFT PRETIBIAL REGION
LOCATION SIMPLE: LEFT ANTERIOR NECK
LOCATION SIMPLE: SCALP
LOCATION SIMPLE: LEFT CHEEK
LOCATION SIMPLE: UPPER BACK

## 2024-06-13 ASSESSMENT — LOCATION DETAILED DESCRIPTION DERM
LOCATION DETAILED: LEFT PROXIMAL PRETIBIAL REGION
LOCATION DETAILED: LEFT INFERIOR CENTRAL MALAR CHEEK
LOCATION DETAILED: UMBILICUS
LOCATION DETAILED: RIGHT PROXIMAL PRETIBIAL REGION
LOCATION DETAILED: SUPERIOR THORACIC SPINE
LOCATION DETAILED: LEFT CENTRAL POSTAURICULAR SKIN
LOCATION DETAILED: LEFT INFERIOR ANTERIOR NECK

## 2025-07-02 ENCOUNTER — APPOINTMENT (OUTPATIENT)
Dept: URBAN - METROPOLITAN AREA CLINIC 4 | Facility: CLINIC | Age: OVER 89
Setting detail: DERMATOLOGY
End: 2025-07-02

## 2025-07-02 DIAGNOSIS — L57.0 ACTINIC KERATOSIS: ICD-10-CM

## 2025-07-02 DIAGNOSIS — L81.4 OTHER MELANIN HYPERPIGMENTATION: ICD-10-CM

## 2025-07-02 DIAGNOSIS — D22 MELANOCYTIC NEVI: ICD-10-CM

## 2025-07-02 DIAGNOSIS — Z85.828 PERSONAL HISTORY OF OTHER MALIGNANT NEOPLASM OF SKIN: ICD-10-CM

## 2025-07-02 DIAGNOSIS — L85.3 XEROSIS CUTIS: ICD-10-CM

## 2025-07-02 DIAGNOSIS — D18.0 HEMANGIOMA: ICD-10-CM

## 2025-07-02 DIAGNOSIS — L82.1 OTHER SEBORRHEIC KERATOSIS: ICD-10-CM

## 2025-07-02 DIAGNOSIS — Z71.89 OTHER SPECIFIED COUNSELING: ICD-10-CM

## 2025-07-02 PROBLEM — D48.5 NEOPLASM OF UNCERTAIN BEHAVIOR OF SKIN: Status: ACTIVE | Noted: 2025-07-02

## 2025-07-02 PROBLEM — D22.62 MELANOCYTIC NEVI OF LEFT UPPER LIMB, INCLUDING SHOULDER: Status: ACTIVE | Noted: 2025-07-02

## 2025-07-02 PROBLEM — D22.61 MELANOCYTIC NEVI OF RIGHT UPPER LIMB, INCLUDING SHOULDER: Status: ACTIVE | Noted: 2025-07-02

## 2025-07-02 PROBLEM — D23.5 OTHER BENIGN NEOPLASM OF SKIN OF TRUNK: Status: ACTIVE | Noted: 2025-07-02

## 2025-07-02 PROBLEM — D18.01 HEMANGIOMA OF SKIN AND SUBCUTANEOUS TISSUE: Status: ACTIVE | Noted: 2025-07-02

## 2025-07-02 PROCEDURE — ? BIOPSY BY SHAVE METHOD

## 2025-07-02 PROCEDURE — ? LIQUID NITROGEN

## 2025-07-02 PROCEDURE — ? ADDITIONAL NOTES

## 2025-07-02 PROCEDURE — ? COUNSELING

## 2025-07-02 PROCEDURE — ? PRESCRIPTION

## 2025-07-02 RX ORDER — AMMONIUM LACTATE 12 %
CREAM (GRAM) TOPICAL BID
Qty: 140 | Refills: 5 | Status: ERX

## 2025-07-02 ASSESSMENT — LOCATION DETAILED DESCRIPTION DERM
LOCATION DETAILED: LEFT PROXIMAL POSTERIOR UPPER ARM
LOCATION DETAILED: RIGHT PROXIMAL POSTERIOR UPPER ARM
LOCATION DETAILED: SUPERIOR THORACIC SPINE
LOCATION DETAILED: UMBILICUS
LOCATION DETAILED: LEFT UPPER CUTANEOUS LIP
LOCATION DETAILED: RIGHT DISTAL POSTERIOR UPPER ARM
LOCATION DETAILED: LEFT DISTAL POSTERIOR UPPER ARM
LOCATION DETAILED: LEFT LATERAL PROXIMAL UPPER ARM
LOCATION DETAILED: PHILTRUM
LOCATION DETAILED: LEFT PROXIMAL PRETIBIAL REGION
LOCATION DETAILED: LEFT ULNAR DORSAL HAND
LOCATION DETAILED: RIGHT RADIAL DORSAL HAND
LOCATION DETAILED: LEFT INFERIOR CENTRAL MALAR CHEEK
LOCATION DETAILED: RIGHT PROXIMAL PRETIBIAL REGION
LOCATION DETAILED: LEFT INFERIOR ANTERIOR NECK
LOCATION DETAILED: LEFT CENTRAL POSTAURICULAR SKIN

## 2025-07-02 ASSESSMENT — LOCATION SIMPLE DESCRIPTION DERM
LOCATION SIMPLE: SCALP
LOCATION SIMPLE: LEFT UPPER ARM
LOCATION SIMPLE: LEFT CHEEK
LOCATION SIMPLE: UPPER LIP
LOCATION SIMPLE: LEFT LIP
LOCATION SIMPLE: RIGHT UPPER ARM
LOCATION SIMPLE: RIGHT PRETIBIAL REGION
LOCATION SIMPLE: UPPER BACK
LOCATION SIMPLE: LEFT ANTERIOR NECK
LOCATION SIMPLE: RIGHT HAND
LOCATION SIMPLE: LEFT HAND
LOCATION SIMPLE: LEFT PRETIBIAL REGION
LOCATION SIMPLE: ABDOMEN

## 2025-07-02 ASSESSMENT — LOCATION ZONE DERM
LOCATION ZONE: TRUNK
LOCATION ZONE: NECK
LOCATION ZONE: FACE
LOCATION ZONE: LEG
LOCATION ZONE: SCALP
LOCATION ZONE: HAND
LOCATION ZONE: LIP
LOCATION ZONE: ARM

## (undated) DEVICE — SHEET TRANSVERSE LAP - (12EA/CA)

## (undated) DEVICE — NEPTUNE 4 PORT MANIFOLD - (20/PK)

## (undated) DEVICE — GLOVE BIOGEL ECLIPSE PF LATEX SIZE 8.0  (50PR/BX)

## (undated) DEVICE — KIT ROOM DECONTAMINATION

## (undated) DEVICE — SPACEMAKER PREP DIST BALLOON - (5/BX) KIT COMPONENT----USE ITEM 14468---

## (undated) DEVICE — GLOVE, LITE (PAIR)

## (undated) DEVICE — Device

## (undated) DEVICE — SODIUM CHL IRRIGATION 0.9% 1000ML (12EA/CA)

## (undated) DEVICE — TUBING FILTER STRYKER

## (undated) DEVICE — SUTURE 4-0 VICRYL PLUS FS-2 - 27 INCH (36/BX)

## (undated) DEVICE — TROCAR 5X100 NON BLADED Z-TH - READ KII (6/BX)

## (undated) DEVICE — SUTURE 0 VICRYL PLUS CT-2 - 27 INCH (36/BX)

## (undated) DEVICE — CANNULA W/SEAL 5X100 Z-THRE - ADED KII (12/BX)

## (undated) DEVICE — TUBING INSUFFLATION - (10/BX)

## (undated) DEVICE — GLOVE SZ 7.5 LF PROTEXIS (50PR/BX)

## (undated) DEVICE — TROCAR LAPSCP 100MM 12MM NTHRD - (6/BX)

## (undated) DEVICE — DEVICE 5MM ABSRB STRAP FIXATION  (6EA/BX)

## (undated) DEVICE — DRESSING TRANSPARENT FILM TEGADERM 2.375 X 2.75"  (100EA/BX)"

## (undated) DEVICE — TUBE CONNECTING SUCTION - CLEAR PLASTIC STERILE 72 IN (50EA/CA)

## (undated) DEVICE — DRESSING TRANSPARENT FILM TEGADERM 4 X 4.75" (50EA/BX)"